# Patient Record
Sex: FEMALE | Race: BLACK OR AFRICAN AMERICAN | Employment: FULL TIME | URBAN - METROPOLITAN AREA
[De-identification: names, ages, dates, MRNs, and addresses within clinical notes are randomized per-mention and may not be internally consistent; named-entity substitution may affect disease eponyms.]

---

## 2020-02-18 PROBLEM — O09.529 AMA (ADVANCED MATERNAL AGE) MULTIGRAVIDA 35+: Status: ACTIVE | Noted: 2020-02-18

## 2020-02-18 PROBLEM — Z82.79 FAMILY HISTORY OF CONGENITAL HEART DEFECT: Status: ACTIVE | Noted: 2020-02-18

## 2020-02-18 PROBLEM — E07.9 THYROID DISEASE: Status: ACTIVE | Noted: 2020-02-18

## 2020-02-18 PROBLEM — D57.3 SICKLE CELL TRAIT (HCC): Status: ACTIVE | Noted: 2020-02-18

## 2020-02-18 PROBLEM — B00.9 HERPES SIMPLEX: Status: ACTIVE | Noted: 2020-02-18

## 2020-03-05 PROBLEM — O99.019 SICKLE CELL TRAIT IN MOTHER AFFECTING PREGNANCY (HCC): Status: ACTIVE | Noted: 2020-02-18

## 2020-03-05 PROBLEM — O35.2XX0 HEREDITARY DISEASE IN FAMILY POSSIBLY AFFECTING FETUS: Status: ACTIVE | Noted: 2020-02-18

## 2020-03-05 PROBLEM — O09.521 MULTIGRAVIDA OF ADVANCED MATERNAL AGE IN FIRST TRIMESTER: Status: ACTIVE | Noted: 2020-02-18

## 2020-03-05 PROBLEM — O99.280 THYROID DYSFUNCTION IN PREGNANCY, ANTEPARTUM: Status: ACTIVE | Noted: 2020-02-18

## 2020-03-09 PROBLEM — O35.2XX0: Status: ACTIVE | Noted: 2020-02-18

## 2020-03-09 PROBLEM — D25.9 UTERINE FIBROID IN PREGNANCY: Status: ACTIVE | Noted: 2020-03-09

## 2020-03-09 PROBLEM — O99.211 OBESITY AFFECTING PREGNANCY IN FIRST TRIMESTER: Status: ACTIVE | Noted: 2020-03-09

## 2020-03-09 PROBLEM — Z86.19 H/O HERPES SIMPLEX INFECTION: Status: ACTIVE | Noted: 2020-02-18

## 2020-03-09 PROBLEM — O34.10 UTERINE FIBROID IN PREGNANCY: Status: ACTIVE | Noted: 2020-03-09

## 2020-03-16 PROBLEM — O99.280 HYPERTHYROIDISM AFFECTING PREGNANCY: Status: ACTIVE | Noted: 2020-03-16

## 2020-03-16 PROBLEM — E05.90 HYPERTHYROIDISM AFFECTING PREGNANCY: Status: ACTIVE | Noted: 2020-03-16

## 2020-03-19 PROBLEM — R87.612 LGSIL ON PAP SMEAR OF CERVIX: Status: ACTIVE | Noted: 2020-03-19

## 2020-04-28 PROBLEM — O35.2XX0: Status: RESOLVED | Noted: 2020-02-18 | Resolved: 2020-04-28

## 2020-04-28 PROBLEM — E05.90 HYPERTHYROIDISM AFFECTING PREGNANCY: Status: RESOLVED | Noted: 2020-03-16 | Resolved: 2020-04-28

## 2020-04-28 PROBLEM — O99.280 HYPERTHYROIDISM AFFECTING PREGNANCY: Status: RESOLVED | Noted: 2020-03-16 | Resolved: 2020-04-28

## 2020-04-29 PROBLEM — O99.212 OBESITY AFFECTING PREGNANCY IN SECOND TRIMESTER: Status: ACTIVE | Noted: 2020-03-09

## 2020-04-29 PROBLEM — O09.522 MULTIGRAVIDA OF ADVANCED MATERNAL AGE IN SECOND TRIMESTER: Status: ACTIVE | Noted: 2020-02-18

## 2020-06-10 PROBLEM — O99.891 BACK PAIN AFFECTING PREGNANCY IN SECOND TRIMESTER: Status: ACTIVE | Noted: 2020-06-10

## 2020-06-10 PROBLEM — M54.9 BACK PAIN AFFECTING PREGNANCY IN SECOND TRIMESTER: Status: ACTIVE | Noted: 2020-06-10

## 2020-07-06 PROBLEM — O24.419 GESTATIONAL DIABETES MELLITUS (GDM): Status: ACTIVE | Noted: 2020-07-06

## 2020-07-20 PROBLEM — O16.9 ELEVATED BLOOD PRESSURE AFFECTING PREGNANCY, ANTEPARTUM: Status: ACTIVE | Noted: 2020-07-20

## 2020-07-22 ENCOUNTER — HOSPITAL ENCOUNTER (OUTPATIENT)
Dept: DIABETES SERVICES | Age: 45
Discharge: HOME | End: 2020-07-22
Payer: COMMERCIAL

## 2020-07-22 VITALS — BODY MASS INDEX: 34.11 KG/M2 | WEIGHT: 205 LBS

## 2020-07-22 PROBLEM — O09.523 MULTIGRAVIDA OF ADVANCED MATERNAL AGE IN THIRD TRIMESTER: Status: ACTIVE | Noted: 2020-02-18

## 2020-07-22 PROBLEM — O24.410 DIET CONTROLLED GESTATIONAL DIABETES MELLITUS (GDM) IN THIRD TRIMESTER: Status: ACTIVE | Noted: 2020-07-06

## 2020-07-22 PROBLEM — O99.213 OBESITY AFFECTING PREGNANCY IN THIRD TRIMESTER: Status: ACTIVE | Noted: 2020-03-09

## 2020-07-22 PROCEDURE — G0109 DIAB MANAGE TRN IND/GROUP: HCPCS

## 2020-07-22 NOTE — PROGRESS NOTES
This is a class  appointment with limited persons allowed in class due to DZSDX-23 public health emergency. Social distancing and mandatory precautions are in place and utilized. Gestational Diabetes Self-Management Support Plan    Services Provided: Pt received education on nutrition and meal planning during pregnancy. Emotional support for adjustment to diagnosis was provided. Care Plan/Recommendations:  Pt instructed to record blood sugar 4x/day and record all meals and snacks. Pt to bring information to appointments with [facility]etal [facility]. Notes for Follow Up:   1. Barriers to checking blood glucose and adherence to meal plan identified:  Pt reports she is not always able to get a break at work to check blood sugars. 2. Barriers to psychological adjustment to diagnosis identified: none  3. Patient needs to be seen by [facility] ASAP due to: appointment is 7/23/20.     Betzy Colon, 66 [address], , CDE  [facility]

## 2020-07-23 PROBLEM — O36.5990 POOR FETAL GROWTH, AFFECTING MANAGEMENT OF MOTHER, ANTEPARTUM CONDITION OR COMPLICATION: Status: ACTIVE | Noted: 2020-07-23

## 2020-07-29 ENCOUNTER — HOSPITAL ENCOUNTER (INPATIENT)
Age: 45
LOS: 3 days | Discharge: HOME | End: 2020-08-01
Attending: OBSTETRICS & GYNECOLOGY | Admitting: OBSTETRICS & GYNECOLOGY
Payer: COMMERCIAL

## 2020-07-29 ENCOUNTER — ANESTHESIA (OUTPATIENT)
Dept: LABOR AND DELIVERY | Age: 45
End: 2020-07-29
Payer: COMMERCIAL

## 2020-07-29 ENCOUNTER — APPOINTMENT (OUTPATIENT)
Dept: ULTRASOUND IMAGING | Age: 45
End: 2020-07-29
Attending: OBSTETRICS & GYNECOLOGY
Payer: COMMERCIAL

## 2020-07-29 ENCOUNTER — ANESTHESIA EVENT (OUTPATIENT)
Dept: LABOR AND DELIVERY | Age: 45
End: 2020-07-29
Payer: COMMERCIAL

## 2020-07-29 DIAGNOSIS — O16.9 ELEVATED BLOOD PRESSURE AFFECTING PREGNANCY, ANTEPARTUM: ICD-10-CM

## 2020-07-29 DIAGNOSIS — O45.90 ANTEPARTUM PLACENTAL ABRUPTION: ICD-10-CM

## 2020-07-29 DIAGNOSIS — O36.4XX0 FETAL DEMISE IN SINGLETON PREGNANCY GREATER THAN 22 WEEKS GESTATION, ANTEPARTUM: ICD-10-CM

## 2020-07-29 DIAGNOSIS — O24.410 DIET CONTROLLED GESTATIONAL DIABETES MELLITUS (GDM) IN THIRD TRIMESTER: ICD-10-CM

## 2020-07-29 LAB
ALBUMIN SERPL-MCNC: 1.8 G/DL (ref 3.5–5)
ALBUMIN SERPL-MCNC: 2 G/DL (ref 3.5–5)
ALBUMIN SERPL-MCNC: 2.6 G/DL (ref 3.5–5)
ALBUMIN/GLOB SERPL: 0.6 {RATIO} (ref 1.2–3.5)
ALBUMIN/GLOB SERPL: 0.7 {RATIO} (ref 1.2–3.5)
ALBUMIN/GLOB SERPL: 0.7 {RATIO} (ref 1.2–3.5)
ALP SERPL-CCNC: 56 U/L (ref 50–130)
ALP SERPL-CCNC: 65 U/L (ref 50–130)
ALP SERPL-CCNC: 94 U/L (ref 50–136)
ALT SERPL-CCNC: 15 U/L (ref 12–65)
ALT SERPL-CCNC: 20 U/L (ref 12–65)
ALT SERPL-CCNC: 40 U/L (ref 12–65)
AMPHET UR QL SCN: NEGATIVE
ANION GAP SERPL CALC-SCNC: 10 MMOL/L (ref 7–16)
ANION GAP SERPL CALC-SCNC: 5 MMOL/L (ref 7–16)
ANION GAP SERPL CALC-SCNC: 8 MMOL/L (ref 7–16)
ANION GAP SERPL CALC-SCNC: 9 MMOL/L (ref 7–16)
ANION GAP SERPL CALC-SCNC: 9 MMOL/L (ref 7–16)
APTT PPP: 27.6 SEC (ref 24.3–35.4)
APTT PPP: 31.6 SEC (ref 24.3–35.4)
APTT PPP: 33.2 SEC (ref 24.3–35.4)
APTT PPP: 40.5 SEC (ref 24.3–35.4)
ARTERIAL PATENCY WRIST A: ABNORMAL
ARTERIAL PATENCY WRIST A: ABNORMAL
AST SERPL-CCNC: 19 U/L (ref 15–37)
AST SERPL-CCNC: 27 U/L (ref 15–37)
AST SERPL-CCNC: 42 U/L (ref 15–37)
BARBITURATES UR QL SCN: NEGATIVE
BASE DEFICIT BLD-SCNC: 23 MMOL/L
BASOPHILS # BLD: 0 K/UL (ref 0–0.2)
BASOPHILS # BLD: 0.1 K/UL (ref 0–0.2)
BASOPHILS NFR BLD: 0 % (ref 0–2)
BDY SITE: ABNORMAL
BDY SITE: ABNORMAL
BENZODIAZ UR QL: NEGATIVE
BILIRUB SERPL-MCNC: 0.1 MG/DL (ref 0.2–1.1)
BILIRUB SERPL-MCNC: 0.3 MG/DL (ref 0.2–1.1)
BILIRUB SERPL-MCNC: 0.4 MG/DL (ref 0.2–1.1)
BUN SERPL-MCNC: 18 MG/DL (ref 6–23)
BUN SERPL-MCNC: 21 MG/DL (ref 6–23)
BUN SERPL-MCNC: 22 MG/DL (ref 6–23)
BUN SERPL-MCNC: 22 MG/DL (ref 6–23)
BUN SERPL-MCNC: 24 MG/DL (ref 6–23)
CA-I BLD-MCNC: 1.44 MMOL/L (ref 1.12–1.32)
CA-I BLD-MCNC: 1.6 MMOL/L (ref 1.12–1.32)
CALCIUM SERPL-MCNC: 7.2 MG/DL (ref 8.3–10.4)
CALCIUM SERPL-MCNC: 7.4 MG/DL (ref 8.3–10.4)
CALCIUM SERPL-MCNC: 7.6 MG/DL (ref 8.3–10.4)
CALCIUM SERPL-MCNC: 7.6 MG/DL (ref 8.3–10.4)
CALCIUM SERPL-MCNC: 8.8 MG/DL (ref 8.3–10.4)
CANNABINOIDS UR QL SCN: NEGATIVE
CHLORIDE SERPL-SCNC: 108 MMOL/L (ref 98–107)
CHLORIDE SERPL-SCNC: 109 MMOL/L (ref 98–107)
CHLORIDE SERPL-SCNC: 110 MMOL/L (ref 98–107)
CHLORIDE SERPL-SCNC: 112 MMOL/L (ref 98–107)
CHLORIDE SERPL-SCNC: 113 MMOL/L (ref 98–107)
CO2 SERPL-SCNC: 17 MMOL/L (ref 21–32)
CO2 SERPL-SCNC: 17 MMOL/L (ref 21–32)
CO2 SERPL-SCNC: 18 MMOL/L (ref 21–32)
CO2 SERPL-SCNC: 18 MMOL/L (ref 21–32)
CO2 SERPL-SCNC: 19 MMOL/L (ref 21–32)
COCAINE UR QL SCN: NEGATIVE
COLLECT TIME,HTIME: 751
COLLECT TIME,HTIME: 751
CREAT SERPL-MCNC: 0.88 MG/DL (ref 0.6–1)
CREAT SERPL-MCNC: 0.91 MG/DL (ref 0.6–1)
CREAT SERPL-MCNC: 0.91 MG/DL (ref 0.6–1)
CREAT SERPL-MCNC: 0.97 MG/DL (ref 0.6–1)
CREAT SERPL-MCNC: 1 MG/DL (ref 0.6–1)
DIFFERENTIAL METHOD BLD: ABNORMAL
EOSINOPHIL # BLD: 0 K/UL (ref 0–0.8)
EOSINOPHIL # BLD: 0.1 K/UL (ref 0–0.8)
EOSINOPHIL # BLD: 0.5 K/UL (ref 0–0.8)
EOSINOPHIL # BLD: 0.6 K/UL (ref 0–0.8)
EOSINOPHIL NFR BLD: 0 % (ref 0.5–7.8)
EOSINOPHIL NFR BLD: 3 % (ref 0.5–7.8)
EOSINOPHIL NFR BLD: 4 % (ref 0.5–7.8)
ERYTHROCYTE [DISTWIDTH] IN BLOOD BY AUTOMATED COUNT: 17.2 % (ref 11.9–14.6)
ERYTHROCYTE [DISTWIDTH] IN BLOOD BY AUTOMATED COUNT: 17.3 % (ref 11.9–14.6)
ERYTHROCYTE [DISTWIDTH] IN BLOOD BY AUTOMATED COUNT: 17.3 % (ref 11.9–14.6)
ERYTHROCYTE [DISTWIDTH] IN BLOOD BY AUTOMATED COUNT: 17.4 % (ref 11.9–14.6)
ERYTHROCYTE [DISTWIDTH] IN BLOOD BY AUTOMATED COUNT: 17.6 % (ref 11.9–14.6)
EST. AVERAGE GLUCOSE BLD GHB EST-MCNC: NORMAL MG/DL
FIBRINOGEN PPP-MCNC: 142 MG/DL (ref 190–501)
FIBRINOGEN PPP-MCNC: 158 MG/DL (ref 190–501)
FIBRINOGEN PPP-MCNC: 265 MG/DL (ref 190–501)
FIBRINOGEN PPP-MCNC: 99 MG/DL (ref 190–501)
GAS FLOW.O2 O2 DELIVERY SYS: ABNORMAL L/MIN
GAS FLOW.O2 O2 DELIVERY SYS: ABNORMAL L/MIN
GLOBULIN SER CALC-MCNC: 2.6 G/DL (ref 2.3–3.5)
GLOBULIN SER CALC-MCNC: 2.9 G/DL (ref 2.3–3.5)
GLOBULIN SER CALC-MCNC: 4.1 G/DL (ref 2.3–3.5)
GLUCOSE BLD STRIP.AUTO-MCNC: 57 MG/DL (ref 65–100)
GLUCOSE BLD STRIP.AUTO-MCNC: 73 MG/DL (ref 65–100)
GLUCOSE SERPL-MCNC: 111 MG/DL (ref 65–100)
GLUCOSE SERPL-MCNC: 119 MG/DL (ref 65–100)
GLUCOSE SERPL-MCNC: 122 MG/DL (ref 65–100)
GLUCOSE SERPL-MCNC: 133 MG/DL (ref 65–100)
GLUCOSE SERPL-MCNC: 148 MG/DL (ref 65–100)
HBA1C MFR BLD: 4.7 %
HCO3 BLD-SCNC: 16.5 MMOL/L (ref 22–26)
HCO3 BLD-SCNC: ABNORMAL MMOL/L (ref 22–26)
HCT VFR BLD AUTO: 19.6 % (ref 35.8–46.3)
HCT VFR BLD AUTO: 21.6 % (ref 35.8–46.3)
HCT VFR BLD AUTO: 21.9 % (ref 35.8–46.3)
HCT VFR BLD AUTO: 23.8 % (ref 35.8–46.3)
HCT VFR BLD AUTO: 25.3 % (ref 35.8–46.3)
HCT VFR BLD AUTO: 27.3 % (ref 35.8–46.3)
HCT VFR BLD AUTO: 35.9 % (ref 35.8–46.3)
HGB BLD-MCNC: 10.8 G/DL (ref 11.7–15.4)
HGB BLD-MCNC: 6.2 G/DL (ref 11.7–15.4)
HGB BLD-MCNC: 6.8 G/DL (ref 11.7–15.4)
HGB BLD-MCNC: 7 G/DL (ref 11.7–15.4)
HGB BLD-MCNC: 7.6 G/DL (ref 11.7–15.4)
HGB BLD-MCNC: 8.2 G/DL (ref 11.7–15.4)
HGB BLD-MCNC: 8.5 G/DL (ref 11.7–15.4)
IMM GRANULOCYTES # BLD AUTO: 0.1 K/UL (ref 0–0.5)
IMM GRANULOCYTES # BLD AUTO: 0.2 K/UL (ref 0–0.5)
IMM GRANULOCYTES NFR BLD AUTO: 1 % (ref 0–5)
INR PPP: 1
INR PPP: 1.1
INR PPP: 1.2
INR PPP: 1.3
LACTATE SERPL-SCNC: 2.7 MMOL/L (ref 0.4–2)
LDH SERPL L TO P-CCNC: 223 U/L (ref 100–190)
LYMPHOCYTES # BLD: 1.6 K/UL (ref 0.5–4.6)
LYMPHOCYTES # BLD: 1.7 K/UL (ref 0.5–4.6)
LYMPHOCYTES # BLD: 1.9 K/UL (ref 0.5–4.6)
LYMPHOCYTES # BLD: 2.1 K/UL (ref 0.5–4.6)
LYMPHOCYTES # BLD: 5.8 K/UL (ref 0.5–4.6)
LYMPHOCYTES # BLD: 6.8 K/UL (ref 0.5–4.6)
LYMPHOCYTES NFR BLD: 11 % (ref 13–44)
LYMPHOCYTES NFR BLD: 38 % (ref 13–44)
LYMPHOCYTES NFR BLD: 45 % (ref 13–44)
LYMPHOCYTES NFR BLD: 7 % (ref 13–44)
LYMPHOCYTES NFR BLD: 8 % (ref 13–44)
LYMPHOCYTES NFR BLD: 9 % (ref 13–44)
MAGNESIUM SERPL-MCNC: 1.5 MG/DL (ref 1.8–2.4)
MCH RBC QN AUTO: 27.6 PG (ref 26.1–32.9)
MCH RBC QN AUTO: 27.7 PG (ref 26.1–32.9)
MCH RBC QN AUTO: 28.1 PG (ref 26.1–32.9)
MCH RBC QN AUTO: 28.4 PG (ref 26.1–32.9)
MCHC RBC AUTO-ENTMCNC: 30.1 G/DL (ref 31.4–35)
MCHC RBC AUTO-ENTMCNC: 31.1 G/DL (ref 31.4–35)
MCHC RBC AUTO-ENTMCNC: 31.5 G/DL (ref 31.4–35)
MCHC RBC AUTO-ENTMCNC: 31.6 G/DL (ref 31.4–35)
MCHC RBC AUTO-ENTMCNC: 31.9 G/DL (ref 31.4–35)
MCHC RBC AUTO-ENTMCNC: 32 G/DL (ref 31.4–35)
MCHC RBC AUTO-ENTMCNC: 32.4 G/DL (ref 31.4–35)
MCV RBC AUTO: 86.9 FL (ref 79.6–97.8)
MCV RBC AUTO: 87.5 FL (ref 79.6–97.8)
MCV RBC AUTO: 87.5 FL (ref 79.6–97.8)
MCV RBC AUTO: 87.8 FL (ref 79.6–97.8)
MCV RBC AUTO: 88 FL (ref 79.6–97.8)
MCV RBC AUTO: 88.9 FL (ref 79.6–97.8)
MCV RBC AUTO: 92.1 FL (ref 79.6–97.8)
METHADONE UR QL: NEGATIVE
MONOCYTES # BLD: 0.9 K/UL (ref 0.1–1.3)
MONOCYTES # BLD: 0.9 K/UL (ref 0.1–1.3)
MONOCYTES # BLD: 1.1 K/UL (ref 0.1–1.3)
MONOCYTES # BLD: 1.1 K/UL (ref 0.1–1.3)
MONOCYTES # BLD: 1.3 K/UL (ref 0.1–1.3)
MONOCYTES # BLD: 1.4 K/UL (ref 0.1–1.3)
MONOCYTES NFR BLD: 5 % (ref 4–12)
MONOCYTES NFR BLD: 5 % (ref 4–12)
MONOCYTES NFR BLD: 6 % (ref 4–12)
MONOCYTES NFR BLD: 7 % (ref 4–12)
NEUTS SEG # BLD: 15.1 K/UL (ref 1.7–8.2)
NEUTS SEG # BLD: 18.4 K/UL (ref 1.7–8.2)
NEUTS SEG # BLD: 19.4 K/UL (ref 1.7–8.2)
NEUTS SEG # BLD: 20.3 K/UL (ref 1.7–8.2)
NEUTS SEG # BLD: 6.8 K/UL (ref 1.7–8.2)
NEUTS SEG # BLD: 7.7 K/UL (ref 1.7–8.2)
NEUTS SEG NFR BLD: 44 % (ref 43–78)
NEUTS SEG NFR BLD: 51 % (ref 43–78)
NEUTS SEG NFR BLD: 83 % (ref 43–78)
NEUTS SEG NFR BLD: 84 % (ref 43–78)
NEUTS SEG NFR BLD: 86 % (ref 43–78)
NEUTS SEG NFR BLD: 86 % (ref 43–78)
NRBC # BLD: 0 K/UL (ref 0–0.2)
NRBC # BLD: 0.02 K/UL (ref 0–0.2)
NRBC # BLD: 0.03 K/UL (ref 0–0.2)
OPIATES UR QL: POSITIVE
PCO2 BLD: 129.9 MMHG (ref 35–45)
PCO2 BLD: >130 MMHG (ref 35–45)
PCP UR QL: NEGATIVE
PH BLD: 6.61 [PH] (ref 7.35–7.45)
PH BLD: 6.71 [PH] (ref 7.35–7.45)
PHOSPHATE SERPL-MCNC: 3 MG/DL (ref 2.5–4.5)
PLATELET # BLD AUTO: 132 K/UL (ref 150–450)
PLATELET # BLD AUTO: 133 K/UL (ref 150–450)
PLATELET # BLD AUTO: 136 K/UL (ref 150–450)
PLATELET # BLD AUTO: 139 K/UL (ref 150–450)
PLATELET # BLD AUTO: 174 K/UL (ref 150–450)
PLATELET # BLD AUTO: 180 K/UL (ref 150–450)
PLATELET # BLD AUTO: 229 K/UL (ref 150–450)
PLATELET COMMENTS,PCOM: ADEQUATE
PMV BLD AUTO: 9 FL (ref 9.4–12.3)
PMV BLD AUTO: 9.3 FL (ref 9.4–12.3)
PMV BLD AUTO: 9.4 FL (ref 9.4–12.3)
PMV BLD AUTO: 9.6 FL (ref 9.4–12.3)
PMV BLD AUTO: 9.7 FL (ref 9.4–12.3)
PMV BLD AUTO: 9.8 FL (ref 9.4–12.3)
PMV BLD AUTO: 9.9 FL (ref 9.4–12.3)
PO2 BLD: 20 MMHG (ref 75–100)
PO2 BLD: <5 MMHG (ref 75–100)
POTASSIUM BLD-SCNC: 6.4 MMOL/L (ref 3.5–5.1)
POTASSIUM BLD-SCNC: 6.4 MMOL/L (ref 3.5–5.1)
POTASSIUM SERPL-SCNC: 4 MMOL/L (ref 3.5–5.1)
POTASSIUM SERPL-SCNC: 4 MMOL/L (ref 3.5–5.1)
POTASSIUM SERPL-SCNC: 4.1 MMOL/L (ref 3.5–5.1)
POTASSIUM SERPL-SCNC: 4.6 MMOL/L (ref 3.5–5.1)
POTASSIUM SERPL-SCNC: 5.2 MMOL/L (ref 3.5–5.1)
PROT SERPL-MCNC: 4.4 G/DL (ref 6.3–8.2)
PROT SERPL-MCNC: 4.9 G/DL (ref 6.3–8.2)
PROT SERPL-MCNC: 6.7 G/DL (ref 6.3–8.2)
PROTHROMBIN TIME: 13.4 SEC (ref 12–14.7)
PROTHROMBIN TIME: 14.6 SEC (ref 12–14.7)
PROTHROMBIN TIME: 15.6 SEC (ref 12–14.7)
PROTHROMBIN TIME: 16.6 SEC (ref 12–14.7)
RBC # BLD AUTO: 2.24 M/UL (ref 4.05–5.2)
RBC # BLD AUTO: 2.46 M/UL (ref 4.05–5.2)
RBC # BLD AUTO: 2.49 M/UL (ref 4.05–5.2)
RBC # BLD AUTO: 2.74 M/UL (ref 4.05–5.2)
RBC # BLD AUTO: 2.89 M/UL (ref 4.05–5.2)
RBC # BLD AUTO: 3.07 M/UL (ref 4.05–5.2)
RBC # BLD AUTO: 3.9 M/UL (ref 4.05–5.2)
RBC MORPH BLD: ABNORMAL
RBC MORPH BLD: ABNORMAL
SAO2 % BLD: 9 % (ref 95–98)
SAO2 % BLD: ABNORMAL % (ref 95–98)
SERVICE CMNT-IMP: ABNORMAL
SERVICE CMNT-IMP: ABNORMAL
SODIUM BLD-SCNC: 132 MMOL/L (ref 136–145)
SODIUM BLD-SCNC: 134 MMOL/L (ref 136–145)
SODIUM SERPL-SCNC: 135 MMOL/L (ref 136–145)
SODIUM SERPL-SCNC: 135 MMOL/L (ref 136–145)
SODIUM SERPL-SCNC: 136 MMOL/L (ref 136–145)
SODIUM SERPL-SCNC: 137 MMOL/L (ref 136–145)
SODIUM SERPL-SCNC: 139 MMOL/L (ref 136–145)
SPECIMEN TYPE: ABNORMAL
SPECIMEN TYPE: ABNORMAL
T4 FREE SERPL-MCNC: 0.9 NG/DL (ref 0.78–1.46)
TSH SERPL DL<=0.005 MIU/L-ACNC: 1.82 UIU/ML
URATE SERPL-MCNC: 4.5 MG/DL (ref 2.6–6)
URATE SERPL-MCNC: 4.8 MG/DL (ref 2.6–6)
URATE SERPL-MCNC: 4.8 MG/DL (ref 2.6–6)
WBC # BLD AUTO: 15.3 K/UL (ref 4.3–11.1)
WBC # BLD AUTO: 15.3 K/UL (ref 4.3–11.1)
WBC # BLD AUTO: 16 K/UL (ref 4.3–11.1)
WBC # BLD AUTO: 18.2 K/UL (ref 4.3–11.1)
WBC # BLD AUTO: 21.4 K/UL (ref 4.3–11.1)
WBC # BLD AUTO: 22.6 K/UL (ref 4.3–11.1)
WBC # BLD AUTO: 24 K/UL (ref 4.3–11.1)
WBC MORPH BLD: ABNORMAL

## 2020-07-29 PROCEDURE — 99291 CRITICAL CARE FIRST HOUR: CPT | Performed by: OBSTETRICS & GYNECOLOGY

## 2020-07-29 PROCEDURE — 77030040922 HC BLNKT HYPOTHRM STRY -A: Performed by: ANESTHESIOLOGY

## 2020-07-29 PROCEDURE — 74011250636 HC RX REV CODE- 250/636: Performed by: OBSTETRICS & GYNECOLOGY

## 2020-07-29 PROCEDURE — 85730 THROMBOPLASTIN TIME PARTIAL: CPT

## 2020-07-29 PROCEDURE — 77030031139 HC SUT VCRL2 J&J -A: Performed by: OBSTETRICS & GYNECOLOGY

## 2020-07-29 PROCEDURE — 80307 DRUG TEST PRSMV CHEM ANLYZR: CPT

## 2020-07-29 PROCEDURE — 74011250636 HC RX REV CODE- 250/636

## 2020-07-29 PROCEDURE — 77030037088 HC TUBE ENDOTRACH ORAL NSL COVD-A: Performed by: ANESTHESIOLOGY

## 2020-07-29 PROCEDURE — 83615 LACTATE (LD) (LDH) ENZYME: CPT

## 2020-07-29 PROCEDURE — 77030002974 HC SUT PLN J&J -A: Performed by: OBSTETRICS & GYNECOLOGY

## 2020-07-29 PROCEDURE — P9016 RBC LEUKOCYTES REDUCED: HCPCS

## 2020-07-29 PROCEDURE — 74011000250 HC RX REV CODE- 250: Performed by: NURSE ANESTHETIST, CERTIFIED REGISTERED

## 2020-07-29 PROCEDURE — 83735 ASSAY OF MAGNESIUM: CPT

## 2020-07-29 PROCEDURE — 83605 ASSAY OF LACTIC ACID: CPT

## 2020-07-29 PROCEDURE — 85384 FIBRINOGEN ACTIVITY: CPT

## 2020-07-29 PROCEDURE — 80053 COMPREHEN METABOLIC PANEL: CPT

## 2020-07-29 PROCEDURE — 74011250637 HC RX REV CODE- 250/637: Performed by: OBSTETRICS & GYNECOLOGY

## 2020-07-29 PROCEDURE — 88307 TISSUE EXAM BY PATHOLOGIST: CPT

## 2020-07-29 PROCEDURE — 65610000001 HC ROOM ICU GENERAL

## 2020-07-29 PROCEDURE — 74011250636 HC RX REV CODE- 250/636: Performed by: NURSE ANESTHETIST, CERTIFIED REGISTERED

## 2020-07-29 PROCEDURE — 82803 BLOOD GASES ANY COMBINATION: CPT

## 2020-07-29 PROCEDURE — 86900 BLOOD TYPING SEROLOGIC ABO: CPT

## 2020-07-29 PROCEDURE — 77030005401 HC CATH RAD ARRO -A: Performed by: ANESTHESIOLOGY

## 2020-07-29 PROCEDURE — 76010000391 HC C SECN FIRST 1 HR: Performed by: OBSTETRICS & GYNECOLOGY

## 2020-07-29 PROCEDURE — 76060000033 HC ANESTHESIA 1 TO 1.5 HR: Performed by: OBSTETRICS & GYNECOLOGY

## 2020-07-29 PROCEDURE — 84481 FREE ASSAY (FT-3): CPT

## 2020-07-29 PROCEDURE — 84100 ASSAY OF PHOSPHORUS: CPT

## 2020-07-29 PROCEDURE — 83036 HEMOGLOBIN GLYCOSYLATED A1C: CPT

## 2020-07-29 PROCEDURE — 85025 COMPLETE CBC W/AUTO DIFF WBC: CPT

## 2020-07-29 PROCEDURE — 84439 ASSAY OF FREE THYROXINE: CPT

## 2020-07-29 PROCEDURE — 86920 COMPATIBILITY TEST SPIN: CPT

## 2020-07-29 PROCEDURE — 82947 ASSAY GLUCOSE BLOOD QUANT: CPT

## 2020-07-29 PROCEDURE — 77030002888 HC SUT CHRMC J&J -A: Performed by: OBSTETRICS & GYNECOLOGY

## 2020-07-29 PROCEDURE — 77030013794 HC KT TRNSDUC BLD EDWD -B: Performed by: ANESTHESIOLOGY

## 2020-07-29 PROCEDURE — 74011000258 HC RX REV CODE- 258: Performed by: NURSE ANESTHETIST, CERTIFIED REGISTERED

## 2020-07-29 PROCEDURE — 74011000258 HC RX REV CODE- 258: Performed by: OBSTETRICS & GYNECOLOGY

## 2020-07-29 PROCEDURE — 76705 ECHO EXAM OF ABDOMEN: CPT

## 2020-07-29 PROCEDURE — 99254 IP/OBS CNSLTJ NEW/EST MOD 60: CPT | Performed by: OBSTETRICS & GYNECOLOGY

## 2020-07-29 PROCEDURE — 77030039425 HC BLD LARYNG TRULITE DISP TELE -A: Performed by: ANESTHESIOLOGY

## 2020-07-29 PROCEDURE — 36430 TRANSFUSION BLD/BLD COMPNT: CPT

## 2020-07-29 PROCEDURE — 77030013292 HC BOWL MX PRSM J&J -A: Performed by: ANESTHESIOLOGY

## 2020-07-29 PROCEDURE — 87635 SARS-COV-2 COVID-19 AMP PRB: CPT

## 2020-07-29 PROCEDURE — 84550 ASSAY OF BLOOD/URIC ACID: CPT

## 2020-07-29 PROCEDURE — 85610 PROTHROMBIN TIME: CPT

## 2020-07-29 PROCEDURE — 77030018846 HC SOL IRR STRL H20 ICUM -A: Performed by: OBSTETRICS & GYNECOLOGY

## 2020-07-29 PROCEDURE — 74011250636 HC RX REV CODE- 250/636: Performed by: ANESTHESIOLOGY

## 2020-07-29 PROCEDURE — 84443 ASSAY THYROID STIM HORMONE: CPT

## 2020-07-29 PROCEDURE — 77030008459 HC STPLR SKN COOP -B: Performed by: OBSTETRICS & GYNECOLOGY

## 2020-07-29 PROCEDURE — 85027 COMPLETE CBC AUTOMATED: CPT

## 2020-07-29 PROCEDURE — 77030032490 HC SLV COMPR SCD KNE COVD -B: Performed by: OBSTETRICS & GYNECOLOGY

## 2020-07-29 PROCEDURE — 77030018836 HC SOL IRR NACL ICUM -A: Performed by: OBSTETRICS & GYNECOLOGY

## 2020-07-29 PROCEDURE — 99284 EMERGENCY DEPT VISIT MOD MDM: CPT | Performed by: OBSTETRICS & GYNECOLOGY

## 2020-07-29 PROCEDURE — 76815 OB US LIMITED FETUS(S): CPT

## 2020-07-29 RX ORDER — SODIUM CHLORIDE, SODIUM LACTATE, POTASSIUM CHLORIDE, CALCIUM CHLORIDE 600; 310; 30; 20 MG/100ML; MG/100ML; MG/100ML; MG/100ML
150 INJECTION, SOLUTION INTRAVENOUS CONTINUOUS
Status: DISPENSED | OUTPATIENT
Start: 2020-07-29 | End: 2020-07-29

## 2020-07-29 RX ORDER — TRANEXAMIC ACID 100 MG/ML
1 INJECTION, SOLUTION INTRAVENOUS ONCE
Status: DISPENSED | OUTPATIENT
Start: 2020-07-29 | End: 2020-07-30

## 2020-07-29 RX ORDER — ACETAMINOPHEN 325 MG/1
650 TABLET ORAL
Status: DISCONTINUED | OUTPATIENT
Start: 2020-07-29 | End: 2020-08-02 | Stop reason: HOSPADM

## 2020-07-29 RX ORDER — SUCCINYLCHOLINE CHLORIDE 20 MG/ML
INJECTION INTRAMUSCULAR; INTRAVENOUS AS NEEDED
Status: DISCONTINUED | OUTPATIENT
Start: 2020-07-29 | End: 2020-07-29 | Stop reason: HOSPADM

## 2020-07-29 RX ORDER — HYDROMORPHONE HYDROCHLORIDE 1 MG/ML
0.5 INJECTION, SOLUTION INTRAMUSCULAR; INTRAVENOUS; SUBCUTANEOUS ONCE
Status: COMPLETED | OUTPATIENT
Start: 2020-07-29 | End: 2020-07-29

## 2020-07-29 RX ORDER — NALOXONE HYDROCHLORIDE 0.4 MG/ML
0.4 INJECTION, SOLUTION INTRAMUSCULAR; INTRAVENOUS; SUBCUTANEOUS AS NEEDED
Status: DISCONTINUED | OUTPATIENT
Start: 2020-07-29 | End: 2020-07-30

## 2020-07-29 RX ORDER — SODIUM CHLORIDE 9 MG/ML
INJECTION, SOLUTION INTRAVENOUS
Status: DISCONTINUED | OUTPATIENT
Start: 2020-07-29 | End: 2020-07-29 | Stop reason: HOSPADM

## 2020-07-29 RX ORDER — LIDOCAINE HYDROCHLORIDE 20 MG/ML
JELLY TOPICAL
Status: DISCONTINUED | OUTPATIENT
Start: 2020-07-29 | End: 2020-07-29 | Stop reason: HOSPADM

## 2020-07-29 RX ORDER — ONDANSETRON 2 MG/ML
4 INJECTION INTRAMUSCULAR; INTRAVENOUS
Status: DISCONTINUED | OUTPATIENT
Start: 2020-07-29 | End: 2020-08-02 | Stop reason: HOSPADM

## 2020-07-29 RX ORDER — ACETAMINOPHEN 650 MG/1
650 SUPPOSITORY RECTAL
Status: DISCONTINUED | OUTPATIENT
Start: 2020-07-29 | End: 2020-08-02 | Stop reason: HOSPADM

## 2020-07-29 RX ORDER — OXYTOCIN/RINGER'S LACTATE 30/500 ML
PLASTIC BAG, INJECTION (ML) INTRAVENOUS
Status: DISCONTINUED | OUTPATIENT
Start: 2020-07-29 | End: 2020-07-29 | Stop reason: HOSPADM

## 2020-07-29 RX ORDER — SODIUM CHLORIDE 9 MG/ML
250 INJECTION, SOLUTION INTRAVENOUS AS NEEDED
Status: DISCONTINUED | OUTPATIENT
Start: 2020-07-29 | End: 2020-07-30

## 2020-07-29 RX ORDER — OXYCODONE AND ACETAMINOPHEN 7.5; 325 MG/1; MG/1
2 TABLET ORAL
Status: DISCONTINUED | OUTPATIENT
Start: 2020-07-29 | End: 2020-08-02 | Stop reason: HOSPADM

## 2020-07-29 RX ORDER — PROMETHAZINE HYDROCHLORIDE 25 MG/1
12.5 TABLET ORAL
Status: DISCONTINUED | OUTPATIENT
Start: 2020-07-29 | End: 2020-08-02 | Stop reason: HOSPADM

## 2020-07-29 RX ORDER — SODIUM CHLORIDE 9 MG/ML
500 INJECTION, SOLUTION INTRAVENOUS CONTINUOUS
Status: DISCONTINUED | OUTPATIENT
Start: 2020-07-29 | End: 2020-07-30

## 2020-07-29 RX ORDER — OXYCODONE HYDROCHLORIDE 5 MG/1
5 TABLET ORAL
Status: DISCONTINUED | OUTPATIENT
Start: 2020-07-29 | End: 2020-07-29

## 2020-07-29 RX ORDER — SODIUM CHLORIDE 0.9 % (FLUSH) 0.9 %
5-40 SYRINGE (ML) INJECTION AS NEEDED
Status: DISCONTINUED | OUTPATIENT
Start: 2020-07-29 | End: 2020-08-02 | Stop reason: HOSPADM

## 2020-07-29 RX ORDER — SODIUM CHLORIDE 0.9 % (FLUSH) 0.9 %
5-40 SYRINGE (ML) INJECTION EVERY 8 HOURS
Status: DISCONTINUED | OUTPATIENT
Start: 2020-07-29 | End: 2020-08-02 | Stop reason: HOSPADM

## 2020-07-29 RX ORDER — DEXTROSE, SODIUM CHLORIDE, SODIUM LACTATE, POTASSIUM CHLORIDE, AND CALCIUM CHLORIDE 5; .6; .31; .03; .02 G/100ML; G/100ML; G/100ML; G/100ML; G/100ML
125 INJECTION, SOLUTION INTRAVENOUS CONTINUOUS
Status: DISCONTINUED | OUTPATIENT
Start: 2020-07-29 | End: 2020-07-29 | Stop reason: HOSPADM

## 2020-07-29 RX ORDER — ETOMIDATE 2 MG/ML
INJECTION INTRAVENOUS AS NEEDED
Status: DISCONTINUED | OUTPATIENT
Start: 2020-07-29 | End: 2020-07-29 | Stop reason: HOSPADM

## 2020-07-29 RX ORDER — FAMOTIDINE 20 MG/1
20 TABLET, FILM COATED ORAL EVERY 24 HOURS
Status: DISCONTINUED | OUTPATIENT
Start: 2020-07-29 | End: 2020-08-02 | Stop reason: HOSPADM

## 2020-07-29 RX ORDER — BUTORPHANOL TARTRATE 2 MG/ML
1 INJECTION INTRAMUSCULAR; INTRAVENOUS
Status: DISCONTINUED | OUTPATIENT
Start: 2020-07-29 | End: 2020-07-29 | Stop reason: HOSPADM

## 2020-07-29 RX ORDER — CEFAZOLIN SODIUM/WATER 2 G/20 ML
SYRINGE (ML) INTRAVENOUS AS NEEDED
Status: DISCONTINUED | OUTPATIENT
Start: 2020-07-29 | End: 2020-07-29 | Stop reason: HOSPADM

## 2020-07-29 RX ORDER — KETOROLAC TROMETHAMINE 30 MG/ML
30 INJECTION, SOLUTION INTRAMUSCULAR; INTRAVENOUS
Status: COMPLETED | OUTPATIENT
Start: 2020-07-29 | End: 2020-07-29

## 2020-07-29 RX ORDER — HYDROMORPHONE HYDROCHLORIDE 1 MG/ML
INJECTION, SOLUTION INTRAMUSCULAR; INTRAVENOUS; SUBCUTANEOUS
Status: COMPLETED
Start: 2020-07-29 | End: 2020-07-29

## 2020-07-29 RX ORDER — LIDOCAINE HYDROCHLORIDE 10 MG/ML
1 INJECTION INFILTRATION; PERINEURAL
Status: DISCONTINUED | OUTPATIENT
Start: 2020-07-29 | End: 2020-07-29 | Stop reason: HOSPADM

## 2020-07-29 RX ORDER — METHYLERGONOVINE MALEATE 0.2 MG/ML
INJECTION INTRAVENOUS AS NEEDED
Status: DISCONTINUED | OUTPATIENT
Start: 2020-07-29 | End: 2020-07-29 | Stop reason: HOSPADM

## 2020-07-29 RX ORDER — OXYTOCIN/RINGER'S LACTATE 30/500 ML
250 PLASTIC BAG, INJECTION (ML) INTRAVENOUS ONCE
Status: DISCONTINUED | OUTPATIENT
Start: 2020-07-29 | End: 2020-07-29 | Stop reason: HOSPADM

## 2020-07-29 RX ORDER — MINERAL OIL
120 OIL (ML) ORAL
Status: DISCONTINUED | OUTPATIENT
Start: 2020-07-29 | End: 2020-07-29 | Stop reason: HOSPADM

## 2020-07-29 RX ORDER — SODIUM CHLORIDE 0.9 % (FLUSH) 0.9 %
5-40 SYRINGE (ML) INJECTION AS NEEDED
Status: DISCONTINUED | OUTPATIENT
Start: 2020-07-29 | End: 2020-07-29 | Stop reason: HOSPADM

## 2020-07-29 RX ORDER — IBUPROFEN 800 MG/1
800 TABLET ORAL
Status: DISCONTINUED | OUTPATIENT
Start: 2020-07-29 | End: 2020-07-29

## 2020-07-29 RX ORDER — HYDROMORPHONE HYDROCHLORIDE 1 MG/ML
1 INJECTION, SOLUTION INTRAMUSCULAR; INTRAVENOUS; SUBCUTANEOUS
Status: DISCONTINUED | OUTPATIENT
Start: 2020-07-29 | End: 2020-07-29

## 2020-07-29 RX ORDER — FENTANYL CITRATE 50 UG/ML
INJECTION, SOLUTION INTRAMUSCULAR; INTRAVENOUS AS NEEDED
Status: DISCONTINUED | OUTPATIENT
Start: 2020-07-29 | End: 2020-07-29 | Stop reason: HOSPADM

## 2020-07-29 RX ORDER — SODIUM CHLORIDE 9 MG/ML
1000 INJECTION, SOLUTION INTRAVENOUS CONTINUOUS
Status: DISCONTINUED | OUTPATIENT
Start: 2020-07-29 | End: 2020-07-30

## 2020-07-29 RX ORDER — OXYCODONE AND ACETAMINOPHEN 7.5; 325 MG/1; MG/1
1 TABLET ORAL
Status: DISCONTINUED | OUTPATIENT
Start: 2020-07-29 | End: 2020-08-02 | Stop reason: HOSPADM

## 2020-07-29 RX ORDER — METHIMAZOLE 5 MG/1
5 TABLET ORAL EVERY 8 HOURS
Status: CANCELLED | OUTPATIENT
Start: 2020-07-29

## 2020-07-29 RX ORDER — DIPHENHYDRAMINE HCL 25 MG
25 CAPSULE ORAL
Status: DISCONTINUED | OUTPATIENT
Start: 2020-07-29 | End: 2020-08-02 | Stop reason: HOSPADM

## 2020-07-29 RX ORDER — POLYETHYLENE GLYCOL 3350 17 G/17G
17 POWDER, FOR SOLUTION ORAL DAILY PRN
Status: DISCONTINUED | OUTPATIENT
Start: 2020-07-29 | End: 2020-08-02 | Stop reason: HOSPADM

## 2020-07-29 RX ORDER — SODIUM CHLORIDE, SODIUM LACTATE, POTASSIUM CHLORIDE, CALCIUM CHLORIDE 600; 310; 30; 20 MG/100ML; MG/100ML; MG/100ML; MG/100ML
INJECTION, SOLUTION INTRAVENOUS
Status: DISCONTINUED | OUTPATIENT
Start: 2020-07-29 | End: 2020-07-29 | Stop reason: HOSPADM

## 2020-07-29 RX ORDER — ZOLPIDEM TARTRATE 5 MG/1
5 TABLET ORAL
Status: DISCONTINUED | OUTPATIENT
Start: 2020-07-29 | End: 2020-08-02 | Stop reason: HOSPADM

## 2020-07-29 RX ORDER — SODIUM CHLORIDE 0.9 % (FLUSH) 0.9 %
5-40 SYRINGE (ML) INJECTION EVERY 8 HOURS
Status: DISCONTINUED | OUTPATIENT
Start: 2020-07-29 | End: 2020-07-29 | Stop reason: HOSPADM

## 2020-07-29 RX ORDER — ROCURONIUM BROMIDE 10 MG/ML
INJECTION, SOLUTION INTRAVENOUS AS NEEDED
Status: DISCONTINUED | OUTPATIENT
Start: 2020-07-29 | End: 2020-07-29 | Stop reason: HOSPADM

## 2020-07-29 RX ORDER — DOCUSATE SODIUM 100 MG/1
100 CAPSULE, LIQUID FILLED ORAL 2 TIMES DAILY
Status: DISCONTINUED | OUTPATIENT
Start: 2020-07-29 | End: 2020-08-02 | Stop reason: HOSPADM

## 2020-07-29 RX ORDER — KETOROLAC TROMETHAMINE 30 MG/ML
30 INJECTION, SOLUTION INTRAMUSCULAR; INTRAVENOUS
Status: DISCONTINUED | OUTPATIENT
Start: 2020-07-29 | End: 2020-07-30

## 2020-07-29 RX ORDER — SIMETHICONE 80 MG
80 TABLET,CHEWABLE ORAL
Status: DISCONTINUED | OUTPATIENT
Start: 2020-07-29 | End: 2020-08-02 | Stop reason: HOSPADM

## 2020-07-29 RX ORDER — HYDROMORPHONE HYDROCHLORIDE 1 MG/ML
0.5 INJECTION, SOLUTION INTRAMUSCULAR; INTRAVENOUS; SUBCUTANEOUS
Status: DISCONTINUED | OUTPATIENT
Start: 2020-07-29 | End: 2020-07-29

## 2020-07-29 RX ADMIN — ONDANSETRON 4 MG: 2 INJECTION INTRAMUSCULAR; INTRAVENOUS at 14:05

## 2020-07-29 RX ADMIN — HYDROMORPHONE HYDROCHLORIDE 0.5 MG: 1 INJECTION, SOLUTION INTRAMUSCULAR; INTRAVENOUS; SUBCUTANEOUS at 08:21

## 2020-07-29 RX ADMIN — SUCCINYLCHOLINE CHLORIDE 160 MG: 20 INJECTION, SOLUTION INTRAMUSCULAR; INTRAVENOUS at 06:43

## 2020-07-29 RX ADMIN — ROCURONIUM BROMIDE 10 MG: 10 INJECTION, SOLUTION INTRAVENOUS at 06:57

## 2020-07-29 RX ADMIN — TRANEXAMIC ACID 1 G: 100 INJECTION, SOLUTION INTRAVENOUS at 06:55

## 2020-07-29 RX ADMIN — FENTANYL CITRATE 50 MCG: 50 INJECTION INTRAMUSCULAR; INTRAVENOUS at 06:56

## 2020-07-29 RX ADMIN — Medication 500 UNITS/HR: at 06:52

## 2020-07-29 RX ADMIN — SODIUM CHLORIDE, SODIUM LACTATE, POTASSIUM CHLORIDE, AND CALCIUM CHLORIDE 150 ML/HR: 600; 310; 30; 20 INJECTION, SOLUTION INTRAVENOUS at 09:00

## 2020-07-29 RX ADMIN — SODIUM CHLORIDE: 9 INJECTION, SOLUTION INTRAVENOUS at 06:53

## 2020-07-29 RX ADMIN — Medication 10 ML: at 22:00

## 2020-07-29 RX ADMIN — SIMETHICONE CHEW TAB 80 MG 80 MG: 80 TABLET ORAL at 23:38

## 2020-07-29 RX ADMIN — METHYLERGONOVINE MALEATE 0.2 MG: 0.2 INJECTION, SOLUTION INTRAMUSCULAR; INTRAVENOUS at 06:57

## 2020-07-29 RX ADMIN — SODIUM CHLORIDE 1000 ML: 9 INJECTION, SOLUTION INTRAVENOUS at 12:15

## 2020-07-29 RX ADMIN — Medication 10 ML: at 14:00

## 2020-07-29 RX ADMIN — SODIUM CHLORIDE, SODIUM LACTATE, POTASSIUM CHLORIDE, AND CALCIUM CHLORIDE 150 ML/HR: 600; 310; 30; 20 INJECTION, SOLUTION INTRAVENOUS at 15:11

## 2020-07-29 RX ADMIN — SODIUM CHLORIDE 500 ML: 900 INJECTION, SOLUTION INTRAVENOUS at 15:00

## 2020-07-29 RX ADMIN — KETOROLAC TROMETHAMINE 30 MG: 30 INJECTION, SOLUTION INTRAMUSCULAR at 08:41

## 2020-07-29 RX ADMIN — OXYCODONE HYDROCHLORIDE AND ACETAMINOPHEN 1 TABLET: 7.5; 325 TABLET ORAL at 18:53

## 2020-07-29 RX ADMIN — Medication 2 G: at 06:50

## 2020-07-29 RX ADMIN — ETOMIDATE 20 MG: 2 INJECTION, SOLUTION INTRAVENOUS at 06:43

## 2020-07-29 RX ADMIN — SODIUM CHLORIDE, SODIUM LACTATE, POTASSIUM CHLORIDE, AND CALCIUM CHLORIDE 150 ML/HR: 600; 310; 30; 20 INJECTION, SOLUTION INTRAVENOUS at 20:51

## 2020-07-29 RX ADMIN — FENTANYL CITRATE 50 MCG: 50 INJECTION INTRAMUSCULAR; INTRAVENOUS at 06:48

## 2020-07-29 RX ADMIN — SUGAMMADEX 200 MG: 100 INJECTION, SOLUTION INTRAVENOUS at 07:16

## 2020-07-29 RX ADMIN — DIPHENHYDRAMINE HYDROCHLORIDE 25 MG: 25 CAPSULE ORAL at 21:39

## 2020-07-29 RX ADMIN — ACETAMINOPHEN 650 MG: 325 TABLET, FILM COATED ORAL at 21:39

## 2020-07-29 RX ADMIN — HYDROMORPHONE HYDROCHLORIDE 0.5 MG: 1 INJECTION, SOLUTION INTRAMUSCULAR; INTRAVENOUS; SUBCUTANEOUS at 08:41

## 2020-07-29 RX ADMIN — Medication 10 ML: at 09:00

## 2020-07-29 RX ADMIN — HYDROMORPHONE HYDROCHLORIDE 0.5 MG: 1 INJECTION, SOLUTION INTRAMUSCULAR; INTRAVENOUS; SUBCUTANEOUS at 07:57

## 2020-07-29 RX ADMIN — SODIUM CHLORIDE 1000 ML: 9 INJECTION, SOLUTION INTRAVENOUS at 12:45

## 2020-07-29 RX ADMIN — SODIUM CHLORIDE 125 MG: 900 INJECTION, SOLUTION INTRAVENOUS at 10:01

## 2020-07-29 RX ADMIN — SODIUM CHLORIDE, SODIUM LACTATE, POTASSIUM CHLORIDE, AND CALCIUM CHLORIDE: 600; 310; 30; 20 INJECTION, SOLUTION INTRAVENOUS at 06:58

## 2020-07-29 NOTE — PROGRESS NOTES
Assessed arterial line so that we could draw labs but line wouldn't flush or draw. Took dressing off and found catheter to be kinked so line straightened and redressed and labs drawn and sent.

## 2020-07-29 NOTE — CONSULTS
Maternal Fetal Medicine Consult Note      Requesting SKY Colón    Chief Complaint:  Pregnancy and vaginal bleeding . History of Present Illness: 40 y.o.  at 32w2d gestation who presents with abdominal pain with vaginal bleeding. Early this am, pt noticed vaginal bleeding while in bed. Came to hospital immediately. In intervening period abdominal pain developed. Upon arrival noted to have moderate vaginal bleeding, anhydramnios, and absent fetal cardiac activity. Initial BP mildly elevated, with subsequent hypotension. Decision made to proceed with emergent  for maternal health. BP has remained normal-mild range postoperatively. No pressor support required. Pt is Restorationism but agrees to have blood products if necessary to save her life. At this time, pt is stable, no DIC. Recent diagnosis with A1GDM, per report normal glucoses with elevated parameters. Upper normotensive/mild range BP with normal HELLP labs last week and upon admission. This does not appear c/w preeclampsia/HELLP syndrome. AMA with normal NT and Anatomy; however new fetal growth lag developed in past month. No NIPT this pregnancy. Examination of fetus postmortem normal anatomy and growth. No obvious abnormalities. No fetal etiology of demise noted. Pt denies drug use. UDS to be sent/cord sample. No COVID-19 symptoms. In ICU to monitor post-operative course.     OB History    Para Term  AB Living   2 2 1 1 0 1   SAB TAB Ectopic Molar Multiple Live Births   0 0 0 0 0 1      # Outcome Date GA Lbr Dewayne/2nd Weight Sex Delivery Anes PTL Lv   2  20 32w2d  3 lb 2 oz (1.417 kg)  CS-LTranv Gen Y FD      Complications: Uterine Rupture   1 Term 05 42w0d  7 lb 6 oz (3.345 kg) M Vag-Spont   MARTHA      Birth Comments: [facility] ([facility])     Past Surgical History:   Procedure Laterality Date    HX WISDOM TEETH EXTRACTION       Past Medical History: Diagnosis Date    Anemia     has never had blood transfusion    Anxiety     Chlamydia     Depression     Genital herpes 2007    no issues since    Hypothyroidism     states \"borderline\"    LGSIL on Pap smear of cervix 3/19/2020     Family History   Problem Relation Age of Onset    Diabetes Mother     Hypertension Mother     Cancer Maternal Grandmother         Uterine cancer?     Cancer Paternal Grandmother         Lung Cancer- smoker     Social History     Socioeconomic History    Marital status: SINGLE     Spouse name: Not on file    Number of children: Not on file    Years of education: Not on file    Highest education level: Not on file   Occupational History    Not on file   Social Needs    Financial resource strain: Not on file    Food insecurity     Worry: Not on file     Inability: Not on file    Transportation needs     Medical: Not on file     Non-medical: Not on file   Tobacco Use    Smoking status: Never Smoker    Smokeless tobacco: Never Used   Substance and Sexual Activity    Alcohol use: Yes     Comment: prior to +UPT    Drug use: Not Currently    Sexual activity: Yes     Partners: Male     Birth control/protection: None   Lifestyle    Physical activity     Days per week: Not on file     Minutes per session: Not on file    Stress: Not on file   Relationships    Social connections     Talks on phone: Not on file     Gets together: Not on file     Attends Christian service: Not on file     Active member of club or organization: Not on file     Attends meetings of clubs or organizations: Not on file     Relationship status: Not on file    Intimate partner violence     Fear of current or ex partner: Not on file     Emotionally abused: Not on file     Physically abused: Not on file     Forced sexual activity: Not on file   Other Topics Concern    Not on file   Social History Narrative    Not on file     No Known Allergies    Current Facility-Administered Medications:     0.9% sodium chloride infusion 250 mL, 250 mL, IntraVENous, PRN, Manisha Ford MD    lactated Ringers infusion, 150 mL/hr, IntraVENous, CONTINUOUS, Vero Velásquez MD    sodium chloride (NS) flush 5-40 mL, 5-40 mL, IntraVENous, Q8H, Vero Velásquez MD    sodium chloride (NS) flush 5-40 mL, 5-40 mL, IntraVENous, PRN, Desmond Ganser, MD    ibuprofen (MOTRIN) tablet 800 mg, 800 mg, Oral, Q6H PRN, Desmond Ganser, MD    oxyCODONE-acetaminophen (PERCOCET 7.5) 7.5-325 mg per tablet 1 Tab, 1 Tab, Oral, Q4H PRN, Desmond Ganser, MD    oxyCODONE-acetaminophen (PERCOCET 7.5) 7.5-325 mg per tablet 2 Tab, 2 Tab, Oral, Q4H PRN, Desmond Ganser, MD    naloxone [facility]) injection 0.4 mg, 0.4 mg, IntraVENous, PRN, Desmond Ganser, MD    [facility]) tablet 80 mg, 80 mg, Oral, AC&HS, Desmond Ganser, MD    docusate sodium (COLACE) capsule 100 mg, 100 mg, Oral, BID, Desmond Ganser, MD    diphenhydrAMINE (BENADRYL) capsule 25 mg, 25 mg, Oral, Q4H PRN, Desmond Ganser, MD    rho D immune globulin (RHOGAM) 1,500 unit (300 mcg) injection 0.3 mg, 300 mcg, IntraMUSCular, ONCE PRN, Desmond Ganser, MD    zolpidem (AMBIEN) tablet 5 mg, 5 mg, Oral, QHS PRN, Desmond Ganser, MD    HYDROmorphone (PF) (DILAUDID) injection 0.5 mg, 0.5 mg, IntraVENous, Q10MIN PRN, Donavon Mendez MD, 0.5 mg at 07/29/20 0841    sodium chloride (NS) flush 5-40 mL, 5-40 mL, IntraVENous, Q8H, Georgie Saldivar MD    sodium chloride (NS) flush 5-40 mL, 5-40 mL, IntraVENous, PRN, Georgie Saldivar MD    acetaminophen (TYLENOL) tablet 650 mg, 650 mg, Oral, Q6H PRN **OR** acetaminophen (TYLENOL) suppository 650 mg, 650 mg, Rectal, Q6H PRN, Georgie Saldivar MD    polyethylene glycol (MIRALAX) packet 17 g, 17 g, Oral, DAILY PRN, Georgie Saldivar MD    promethazine (PHENERGAN) tablet 12.5 mg, 12.5 mg, Oral, Q6H PRN **OR** ondansetron (ZOFRAN) injection 4 mg, 4 mg, IntraVENous, Q6H PRN, Georgie Saldivar MD    famotidine (PEPCID) tablet 20 mg, 20 mg, Oral, Q24H, Buster Fall MD    HYDROmorphone (PF) (DILAUDID) injection 1 mg, 1 mg, IntraVENous, Q4H PRN, Bibiana Saldivar MD    naloxone [facility]) injection 0.4 mg, 0.4 mg, IntraVENous, PRN, Bibiana Saldivar MD    ferric gluconate (FERRLECIT) 125 mg in 0.9% sodium chloride 100 mL IVPB, 125 mg, IntraVENous, DAILY, Bibiana Saldivar MD, Last Rate: 110 mL/hr at 07/29/20 1001, 125 mg at 07/29/20 1001    ketorolac (TORADOL) injection 30 mg, 30 mg, IntraVENous, Q6H PRN, Buster Fall MD    Review of Systems  A comprehensive review of systems was negative except for that written in the HPI. Objective:     Vitals:     Patient Vitals for the past 24 hrs:   Temp Pulse Resp BP SpO2   07/29/20 1056  67 24 (!) 61/34 97 %   07/29/20 1053  73 26 (!) 62/33 94 %   07/29/20 1047  67 23 (!) 83/47 99 %   07/29/20 1045  68 20 (!) 69/39 100 %   07/29/20 1042  70 16 (!) 56/38 99 %   07/29/20 1030  76 (!) 31 (!) 74/44 99 %   07/29/20 1015  71 (!) 35 96/59 99 %   07/29/20 0945  73 24 104/61 99 %   07/29/20 0915  72 15 111/72 98 %   07/29/20 0900  72 18 134/76 99 %   07/29/20 0845  69 21 124/75 98 %   07/29/20 0830  64 20 125/72 98 %   07/29/20 0815  62 27 144/83 99 %   07/29/20 0800  64 (!) 41 133/84 100 %   07/29/20 0758 98.3 °F (36.8 °C) 60 24 150/75 100 %   07/29/20 0753  60 20  100 %   07/29/20 0748  65 21  98 %   07/29/20 0744  64 23 141/77 99 %   07/29/20 0737 98.6 °F (37 °C) 61 15 172/84 99 %   07/29/20 0626  (!) 52  (!) 76/46    07/29/20 0624  (!) 55  (!) 75/44 100 %   07/29/20 0619     100 %   07/29/20 0614     100 %   07/29/20 0609     99 %   07/29/20 0604     100 %   07/29/20 0559     99 %   07/29/20 0554  77  139/75 100 %        I&O:  07/29 0701 - 07/29 1900  In: 1700 [I.V.:1700]  Out: 965 [Urine:60]            No intake/output data recorded.   Output by Drain (mL) 07/27/20 0701 - 07/27/20 1900 07/27/20 1901 - 07/28/20 0700 07/28/20 0701 - 07/28/20 1900 07/28/20 1901 - 07/29/20 0700 07/29/20 0701 - 07/29/20 1137   Patient has no LDAs of requested type attached. Exam:   Patient without distress. Abdomen: soft, appropriately tender. Lower Extremity Edema: 1+  Skin: normal coloration and turgor, no rashes, no suspicious skin lesions noted.    Neurologic: negative  Psychiatric: appropriate grief     Labs:   CBC:    Recent Labs     07/29/20  1119 07/29/20  0954 07/29/20  0700 07/29/20  0630 07/16/20  1019 06/25/20  1056 02/18/20  1342 02/18/20   WBC 18.2* 24.0* 15.3* 15.3* 11.6*  --  11.0*  --    HGB 6.8* 8.2* 8.5* 10.8* 11.4 10.1* 11.8  --    HCT 21.6* 25.3* 27.3* 35.9 36.3  --  34.4  --    * 180 174 229 372  --  352  --    HGBEXT  --   --   --   --   --   --   --  11.8   HCTEXT  --   --   --   --   --   --   --  34.4   PLTEXT  --   --   --   --   --   --   --  352       CMP:   Recent Labs     07/29/20  0954 07/29/20  0700 07/29/20  0630 07/16/20  1019 07/02/20  0818    135* 135* 137  --    K 4.1 4.6 4.0 4.5  --    * 109* 108* 106  --    CO2 18* 17* 17* 18*  --    AGAP 9 9 10  --   --    * 122* 119* 85 81   BUN 24* 21 22 13  --    CREA 1.00 0.97 0.88 0.78  --    GFRAA >60 >60 >60 107  --    GFRNA >60 >60 >60 93  --    CA 7.6* 7.6* 8.8 9.4  --    ALB  --   --  2.6* 3.6*  --    TP  --   --  6.7 6.1  --    GLOB  --   --  4.1*  --   --    AGRAT  --   --  0.6* 1.4  --    ALT  --   --  15 14  --        Recent Labs     07/29/20  0954 07/29/20  0700 07/29/20  0630 07/16/20  1019   URICA 4.8 4.5 4.8 5.0   LDH  --  223*  --  159       COAGS:    Recent Labs     07/29/20 0630   APTT 27.6   PTP 13.4   INR 1.0       Recent Glucose Results: Recent Glucose Results:   Recent Labs     07/29/20  0954 07/29/20  0701 07/29/20  0700 07/29/20  0630 07/16/20  1019 07/02/20  0818   *  --  122* 119* 85 80   GLUCPOC  --  73 57*  --   --   --      Prenatal Labs:    Lab Results   Component Value Date/Time    Rubella, External Immune 02/18/2020    HBsAg, External Negative 02/18/2020 HIV, External Non-Reactive 2020    RPR, External Non-Reactive 2020    Gonorrhea, External negative 2020    Chlamydia, External negative 2020     Assessment and Plan:  40 y.o.  at 32w2d with     Patient Active Problem List    Diagnosis    Antepartum placental abruption    Fetal demise in ervin pregnancy greater than 22 weeks gestation, antepartum    Multigravida of advanced maternal age in third trimester     Referred to [facility]   28 week GTT-171  HGB-10.1-start daily iron OTC  3 hr GTT-  3/9/2020 at [facility]:  Normal NT and nasal bone. Declines genetic testing at this time due to financial concerns- will look into cost based on her deductible/insurance/income. Genetic counseling done by genetic counselor and MD.  2020 at [facility]:  Normal anatomy with limited fetal Echo due to fetal position. 2020 at [facility]: New finding of lagging fetal growth, shortened long bones. I am concerned that this finding, AMA and EIF increases risk of Trisomy-21. This was explained to the patient. IUFD due to catastrophic abruption 2020         Hereditary disease in family possibly affecting fetus     States FOB son was born with heart defect- type or surgical hx unknown  FOB has SS trait per patient      Poor fetal growth, affecting management of mother, antepartum condition or complication    Elevated blood pressure affecting pregnancy, antepartum     Pre-E labs-WNL  P/C ratio-129 20 at [facility]:  BP mildly elevated at 136/82 (has been using Afrin nasal spray), no preeclamptic symptoms. PreE workup on --->  HELLP WNL; p/c ratio 129.  Diet controlled gestational diabetes mellitus (GDM) in third trimester     Failed 3 hr GTT.    2020 at [facility]:  Normal f/u Echo with limited views of ventricular septum. New finding of Fetal growth restriction, normal ZAINAB; reassuring fetal status. AC 1%, overall 14%, ZAINAB WNL, UA Dopplers WNL, BPP . Patient here for new GDM. Went to [facility] on 7/22. Reports FBG's normally 88-95 except this AM was 100 and 1hPP usually 117-126 except when experimenting with certain foods had a 425. Discussed low carb diet with increased protein. Will do elevated parameters due to FGR today.  Thyroid dysfunction in pregnancy, antepartum     3/13/20-TSH 0.007  T4-2.07  3/16/20-start methimazole 5 mg tid. Recheck 4 weeks. TSH-  T4-    States hx of \"borderline\" thyroid- checked TSH/Free T4 with PN labs  2/18/20 TSH (0.018) Free T4 (2.25)  Per Dr. Tyson Public- no treatment for now recheck in 2 weeks @ NOB exam  TSH/Free T4 @ 3/13/20 visit  3/9/2020 at [facility]:  Pt with mild hyperthyroidism on NOB labs; Pt has been self-treating with \"herbal thyroid remedy recommended by person at store based on my symptoms. \" She has 10 year history of periods of hair loss followed by regrowth (Has wig on today). Was recently told by someone \"in a waiting room\" that she had a goiter. Pt to bring herbal remedy to primary OB appt Friday. Recheck TSH, FT4, FT3 at OB appt  No goiter on exam.   4/29/2020 at [facility]:  Taking Methimazole 5 mg TID. Most recent TFT's drawn on 3/13, see above. No symptoms at this time. 5/21/20-TSH-0.075  T4-1.36-cont current dose  6/10/2020 at [facility]: Taking Methimazole 5 mg TID. Most recent TFT WNL on 5/1.  7/23/2020 at [facility]:  Continues to take Methimazole 5 mg TID. No recent labs since 5/21, see above.  Back pain affecting pregnancy in third trimester     6/10/2020 at [facility]: Severe sciatic back pain radiating down left leg. Magnesium oil topically helps some. Has history but standing at work has made the pain worse. Extensive review of symptomatic care options.        Obesity affecting pregnancy in third trimester     See GDM Overview      LGSIL on Pap smear of cervix     3/13/20 HR HPV positive- needs Colposcopy      Uterine fibroid in pregnancy     3/9/2020 at [facility]: unlikely to have clinical significance  6/10/2020 at [facility]: Stable    See GDM Overview      H/O herpes simplex infection     HSV Type 2 positive & admits to hx of outbreak  Will need Valtrex @ 36 weeks ___  3/9/2020 at [facility]:    · Valtrex 500 mg prophylactic             IUFD with catastrophic abruption. Unclear etiology. HTN may be cause, but overall picture is not c/w severe preeclamptic abruption. As no good explanation, will have COVID testing as it may impact placentation. BPP 8/8 yesterday. Will offer genetic testing ([employer] pregnancy loss microarray kit) on placenta/cord blood. But normal fetal phenotype today. Repeat labs at 1300. Time:90 with >60 minutes critical care time  minutes spent on floor,with greater than 50% of the time examining patient, explaining plan and coordinating care with nurse and requesting primary physician.

## 2020-07-29 NOTE — ANESTHESIA POSTPROCEDURE EVALUATION
Procedure(s):   SECTION. No value filed. Anesthesia Post Evaluation      Multimodal analgesia: multimodal analgesia used between 6 hours prior to anesthesia start to PACU discharge  Patient location during evaluation: ICU  Patient participation: complete - patient participated  Level of consciousness: awake and alert  Pain management: adequate  Airway patency: patent  Anesthetic complications: no  Cardiovascular status: acceptable  Respiratory status: acceptable  Hydration status: acceptable  Post anesthesia nausea and vomiting:  none  Final Post Anesthesia Temperature Assessment:  Normothermia (36.0-37.5 degrees C)      INITIAL Post-op Vital signs:   Vitals Value Taken Time   /72 2020  9:15 AM   Temp     Pulse 69 2020  9:23 AM   Resp 18 2020  9:23 AM   SpO2 98 % 2020  9:23 AM   Vitals shown include unvalidated device data.

## 2020-07-29 NOTE — PROGRESS NOTES
Patient doing stable. Repeat Hgb 7.0 Plts 136 coags normal.  BP better making some urine. US shows some free fluid but not unexpected post c section. Discussed with Pt and mother that recommend transfusion PRBC's. She is hesitant to take due to beliefs. At this point is stable. Due to her concern about receiving blood and stable at this time discussed watching closely for now and repeat labs again in couple hours. Discussed with Dr Alexandria Jimenez.

## 2020-07-29 NOTE — PROGRESS NOTES
Late entry  Pt seen in triage and noted to have a placental abruption with no fetal heart tones. Pt had a sudden drop in bp and decision made to emergently proceed with csection. Informed pt of this decision and discussed giving blood as a life saving measure. Pt consented to blood. Taken emergently to OR and given general anesthesia for csection. OR note done by Dr. Malou Trimble. Phanestil  incision done- on entry to abdomen noted uterus to be blue in color, when opening uterus, large clot of blood immediately seen;   Clear amniotic fluid  Baby delivered with no tone or evidence of breathing, nicu present  Placenta delivered- appeared to be complete abruption  Uterus closed with double layer vicryl. Hemostatic. After uterine closure, Dr. Malou Trimble completed case as he as documented.

## 2020-07-29 NOTE — PROGRESS NOTES
Pt arrived to ICU via bed from OR with CRNA at bedside as well. Lulu Berumen RN to bedside to assist with care. Report given, all questions answered. Patient is awake and complains of pain. No other complaints. Fundus is firm with small amount rubra lochia. Vital signs obtained. Will continue to monitor.

## 2020-07-29 NOTE — PROGRESS NOTES
Ultrasound is here and Dr. Gladis Hernandez here and discussing the possibility of receiving blood products. Patient is now wanting to wait and see what ultrasound shows.

## 2020-07-29 NOTE — PROGRESS NOTES
Patient stated she couldn't breathe. Placed on nc 4 lpm.  L/D nurse here massaging fundus. Dr Ginna Johnson is speaking with DR. Dillard

## 2020-07-29 NOTE — PROGRESS NOTES
Received information concerning patient and fetal demise from [facility]. Talked with staff and took up white beau baby basket with bereavement items. I talked at length with mother of patient who was in surgery. I was also present when boyfriend came. I took mother and boyfriend to ICU so that they could be with patient. Prayer and support given. Patient tearful. I will continue to support as needed. Mariajose Portillo M.Div.

## 2020-07-29 NOTE — PROGRESS NOTES
7557:  Pt arrived in [facility] with complaints of extreme abdominal pain, heavy bleeding. Reports filled toilet at home with blood. Placed on EFM. Unable to find  [address]. Dr. Cristal Davis called to the bedside.    0601: Dr. Cristal Davis at the beside. SVE 1/thick    0602: Attempting to start IV. Unable to access and draw labs. Finally #22 placed and labs drawn. 3131:  Dr. Cristal Davis did ultrasound at bedside. See her notes    0620: Dr. Cristal Davis informed patient's mother and patient that child was  and that pt was losing a lot of blood. Pt and her mothers states that she is Yazidi. States she does not want to receive blood but began crying and shaking her head. Dr. Cristal Davis informed patient that there was a possibility that if she did not take blood it could cost her her life. Pt stated \"I am 40years old and have another child. I have to live. \" Dr. Cristal Davis asked are you  Okay with the blood? I am going to give you blood. Pt cried and asked to speak to her mom who had left the room. Dr. Cristal Davis asked pt again and said to her that this was her decision. Pt shook her head yes and Dr. Cristal Davis said is that yes and pt said yes. 0846:  C/S called. Dr Paula Mancia states not to move pt yet because he is in an emergency downstairs    4186:  Dr. Heraclio Butlerots at bedside.  Bed pulled out and pt taken to OR

## 2020-07-29 NOTE — PROGRESS NOTES
Patient found to be hypotensive. NS 100ml on pressure bag going in as a bolus. MD being called. L/D nurse present and it was discussed about patient receiving blood if she needed it. She agreed to receive it if \"absolutely necessary\". Patient was type and crossed.

## 2020-07-29 NOTE — H&P
History & Physical    Name: Ene Mcqueen MRN: [id number]  SSN: [id number]    YOB: 1975  Age: 40 y.o. Sex: female      Subjective:     Reason for Admission:  Pregnancy and Placental Abruption    History of Present Illness: Ms. Jamie Renae is a 40 y.o.  female with an estimated gestational age of 33w3d with Estimated Date of Delivery: 20. Patient complains of moderate vaginal bleeding beginning this am. Pt says she was in bed looking at her phone and feeling well when she felt something wet going down her leg. She noticed blood and her mother immediately drove her to [address] [place] [address]. Now that she is here she is complaining of abdominal discomfort- generally cannot get comfortable and complaining of being hot. Noticed on her menstrual pad on arrival is only a moderate amount of blood. Pad is barely soaked with no obvious clots. Pregnancy has been complicated by advanced maternal age, diabetes - gestational, elevated blood pressure in physician's office  and hyperthyroidism. . Patient denies chest pain, fever, nausea and vomiting, right upper quadrant pain  , shortness of breath and swelling.     OB History    Para Term  AB Living   2 2 1 1 0 1   SAB TAB Ectopic Molar Multiple Live Births   0 0 0 0 0 1      # Outcome Date GA Lbr Dewayne/2nd Weight Sex Delivery Anes PTL Lv   2  20 32w2d  1.417 kg  CS-LTranv Gen Y FD      Complications: Uterine Rupture   1 Term 05 42w0d  3.345 kg M Vag-Spont   MARTHA      Birth Comments: [facility] [facility]     Past Medical History:   Diagnosis Date    Anemia     has never had blood transfusion    Anxiety     Chlamydia     Depression     Genital herpes     no issues since    Hypothyroidism     states \"borderline\"    LGSIL on Pap smear of cervix 3/19/2020     Past Surgical History:   Procedure Laterality Date    HX WISDOM TEETH EXTRACTION       Social History     Occupational History    Not on file   Tobacco Use    Smoking status: Never Smoker    Smokeless tobacco: Never Used   Substance and Sexual Activity    Alcohol use: Yes     Comment: prior to +UPT    Drug use: Not Currently    Sexual activity: Yes     Partners: Male     Birth control/protection: None      Family History   Problem Relation Age of Onset    Diabetes Mother     Hypertension Mother     Cancer Maternal Grandmother         Uterine cancer?  Cancer Paternal Grandmother         Lung Cancer- smoker       No Known Allergies  Prior to Admission medications    Medication Sig Start Date End Date Taking? Authorizing Provider   Blood-Glucose Meter monitoring kit Check BS 4 x daily. Fasting and one hour after each meal. Please provide meter insurance will cover. 20   Savannah Adams NP   glucose blood VI test strips (blood glucose test) strip Check BS 4 x daily. Fasting and one hour after each meal. 20   Savannah Adams NP   lancets misc Check BS 4 x daily. 20   Savannah Adams NP   methIMAzole (TAPAZOLE) 5 mg tablet Take 1 Tab by mouth every eight (8) hours. 20   Glenn Crane MD   aspirin 81 mg chewable tablet Take 162 mg by mouth daily. Provider, Historical   multivit 47/iron/folate 1/dha (PNV-DHA PO) Take  by mouth. Provider, Historical        Review of Systems:  A comprehensive review of systems was negative except for that written in the History of Present Illness. a 12 point review of systems negative. Objective:     Vitals:    Vitals:    20 0744 20 0748 20 0758 20 0800   BP: 141/77  150/75 133/84   Pulse: 64 65 60 64   Resp: 23 21  (!) 41   Temp:   98.3 °F (36.8 °C)    SpO2: 99% 98%  100%      Temp (24hrs), Av.5 °F (36.9 °C), Min:98.3 °F (36.8 °C), Max:98.6 °F (37 °C)    BP  Min: 75/44  Max: 172/84     Physical Exam:  Patient c/o feeling hot and appearing restless and uncomfortable. Normal respiratory effort. Alert.  Talking coherently and without difficulty speaking  Heart: Regular rate and rhythm  Lung: clear to auscultation throughout lung fields, no wheezes, no rales, no rhonchi and normal respiratory effort  Abdomen: soft, nontender  Fundus: firm and moderately tender  Perineum: blood present, amniotic fluid absent  Cervical Exam: 1 cm dilated    50% effaced    -3 station    Presenting Part: cephalic  Lower Extremities:  - Edema No   - Patellar Reflexes: 2+ bilaterally     Membranes:  Intact  Uterine Activity: not on toco  Fetal Heart Rate:  No fetal heart rate detected with doppler or with ultrasound  Neuro: pt awake, alert, oriented, restless, complaining of being hot    Lab/Data Review:  Recent Results (from the past 24 hour(s))   AMB POC OB URINE DIP    Collection Time: 07/28/20  1:59 PM   Result Value Ref Range    Glucose (UA POC) Negative Negative    Protein (UA POC) 2+ Negative   METABOLIC PANEL, COMPREHENSIVE    Collection Time: 07/29/20  6:30 AM   Result Value Ref Range    Sodium 135 (L) 136 - 145 mmol/L    Potassium 4.0 3.5 - 5.1 mmol/L    Chloride 108 (H) 98 - 107 mmol/L    CO2 17 (L) 21 - 32 mmol/L    Anion gap 10 7 - 16 mmol/L    Glucose 119 (H) 65 - 100 mg/dL    BUN 22 6 - 23 MG/DL    Creatinine 0.88 0.6 - 1.0 MG/DL    GFR est AA >60 >60 ml/min/1.73m2    GFR est non-AA >60 >60 ml/min/1.73m2    Calcium 8.8 8.3 - 10.4 MG/DL    Bilirubin, total 0.3 0.2 - 1.1 MG/DL    ALT (SGPT) 15 12 - 65 U/L    AST (SGOT) 19 15 - 37 U/L    Alk.  phosphatase 94 50 - 136 U/L    Protein, total 6.7 6.3 - 8.2 g/dL    Albumin 2.6 (L) 3.5 - 5.0 g/dL    Globulin 4.1 (H) 2.3 - 3.5 g/dL    A-G Ratio 0.6 (L) 1.2 - 3.5     CBC WITH AUTOMATED DIFF    Collection Time: 07/29/20  6:30 AM   Result Value Ref Range    WBC 15.3 (H) 4.3 - 11.1 K/uL    RBC 3.90 (L) 4.05 - 5.2 M/uL    HGB 10.8 (L) 11.7 - 15.4 g/dL    HCT 35.9 35.8 - 46.3 %    MCV 92.1 79.6 - 97.8 FL    MCH 27.7 26.1 - 32.9 PG    MCHC 30.1 (L) 31.4 - 35.0 g/dL    RDW 17.6 (H) 11.9 - 14.6 %    PLATELET 683 672 - 815 K/uL    MPV 9.8 9.4 - 12.3 FL    ABSOLUTE NRBC 0.02 0.0 - 0.2 K/uL    DF AUTOMATED      NEUTROPHILS 44 43 - 78 %    LYMPHOCYTES 45 (H) 13 - 44 %    MONOCYTES 6 4.0 - 12.0 %    EOSINOPHILS 4 0.5 - 7.8 %    BASOPHILS 0 0.0 - 2.0 %    IMMATURE GRANULOCYTES 1 0.0 - 5.0 %    ABS. NEUTROPHILS 6.8 1.7 - 8.2 K/UL    ABS. LYMPHOCYTES 6.8 (H) 0.5 - 4.6 K/UL    ABS. MONOCYTES 0.9 0.1 - 1.3 K/UL    ABS. EOSINOPHILS 0.6 0.0 - 0.8 K/UL    ABS. BASOPHILS 0.1 0.0 - 0.2 K/UL    ABS. IMM.  GRANS. 0.1 0.0 - 0.5 K/UL   PROTHROMBIN TIME + INR    Collection Time: 07/29/20  6:30 AM   Result Value Ref Range    Prothrombin time 13.4 12.0 - 14.7 sec    INR 1.0     PTT    Collection Time: 07/29/20  6:30 AM   Result Value Ref Range    aPTT 27.6 24.3 - 35.4 SEC   FIBRINOGEN    Collection Time: 07/29/20  6:30 AM   Result Value Ref Range    Fibrinogen 265 190 - 501 mg/dL   URIC ACID    Collection Time: 07/29/20  6:30 AM   Result Value Ref Range    Uric acid 4.8 2.6 - 6.0 MG/DL   POC CG8I    Collection Time: 07/29/20  7:00 AM   Result Value Ref Range    Device: OTHER      pH (POC) 6.61 (LL) 7.35 - 7.45      pCO2 (POC) >130.0 (HH) 35 - 45 MMHG    pO2 (POC) <5 (LL) 75 - 100 MMHG    HCO3 (POC) Cannot be calculated 22 - 26 MMOL/L    sO2 (POC) Cannot be calculated 95 - 98 %    Allens test (POC) NOT APPLICABLE      Site CORD      Specimen type (POC) ARTERIAL CORD      Sodium,  (L) 136 - 145 MMOL/L    Potassium, POC 6.4 (H) 3.5 - 5.1 MMOL/L    Glucose, POC 57 (L) 65 - 100 MG/DL    Calcium, ionized (POC) 1.44 (H) 1.12 - 1.32 mmol/L    Performed by [place]     COLLECT TIME 751     CBC WITH AUTOMATED DIFF    Collection Time: 07/29/20  7:00 AM   Result Value Ref Range    WBC 15.3 (H) 4.3 - 11.1 K/uL    RBC 3.07 (L) 4.05 - 5.2 M/uL    HGB 8.5 (L) 11.7 - 15.4 g/dL    HCT 27.3 (L) 35.8 - 46.3 %    MCV 88.9 79.6 - 97.8 FL    MCH 27.7 26.1 - 32.9 PG    MCHC 31.1 (L) 31.4 - 35.0 g/dL    RDW 17.3 (H) 11.9 - 14.6 %    PLATELET 119 757 - 049 K/uL    MPV 9.6 9.4 - 12.3 FL    ABSOLUTE NRBC 0.03 0.0 - 0.2 K/uL    NEUTROPHILS 51 43 - 78 %    LYMPHOCYTES 38 13 - 44 %    MONOCYTES 7 4.0 - 12.0 %    EOSINOPHILS 3 0.5 - 7.8 %    BASOPHILS 0 0.0 - 2.0 %    IMMATURE GRANULOCYTES 1 0.0 - 5.0 %    ABS. NEUTROPHILS 7.7 1.7 - 8.2 K/UL    ABS. LYMPHOCYTES 5.8 (H) 0.5 - 4.6 K/UL    ABS. MONOCYTES 1.1 0.1 - 1.3 K/UL    ABS. EOSINOPHILS 0.5 0.0 - 0.8 K/UL    ABS. BASOPHILS 0.0 0.0 - 0.2 K/UL    ABS. IMM.  GRANS. 0.2 0.0 - 0.5 K/UL    RBC COMMENTS SLIGHT  ANISOCYTOSIS + POIKILOCYTOSIS        RBC COMMENTS OCCASIONAL  POLYCHROMASIA        WBC COMMENTS Result Confirmed By Smear      PLATELET COMMENTS ADEQUATE      DF AUTOMATED     TSH 3RD GENERATION    Collection Time: 07/29/20  7:00 AM   Result Value Ref Range    TSH 1.820 uIU/mL   LD    Collection Time: 07/29/20  7:00 AM   Result Value Ref Range     (H) 100 - 033 U/L   METABOLIC PANEL, BASIC    Collection Time: 07/29/20  7:00 AM   Result Value Ref Range    Sodium 135 (L) 136 - 145 mmol/L    Potassium 4.6 3.5 - 5.1 mmol/L    Chloride 109 (H) 98 - 107 mmol/L    CO2 17 (L) 21 - 32 mmol/L    Anion gap 9 7 - 16 mmol/L    Glucose 122 (H) 65 - 100 mg/dL    BUN 21 6 - 23 MG/DL    Creatinine 0.97 0.6 - 1.0 MG/DL    GFR est AA >60 >60 ml/min/1.73m2    GFR est non-AA >60 >60 ml/min/1.73m2    Calcium 7.6 (L) 8.3 - 10.4 MG/DL   URIC ACID    Collection Time: 07/29/20  7:00 AM   Result Value Ref Range    Uric acid 4.5 2.6 - 6.0 MG/DL   POC CG8I    Collection Time: 07/29/20  7:01 AM   Result Value Ref Range    Device: OTHER      pH (POC) 6.71 (LL) 7.35 - 7.45      pCO2 (POC) 129.9 (HH) 35 - 45 MMHG    pO2 (POC) 20 (LL) 75 - 100 MMHG    HCO3 (POC) 16.5 (L) 22 - 26 MMOL/L    sO2 (POC) 9 (L) 95 - 98 %    Base deficit (POC) 23 mmol/L    Allens test (POC) NOT APPLICABLE      Site CORD      Specimen type (POC) VENOUS CORD      Sodium,  (L) 136 - 145 MMOL/L    Potassium, POC 6.4 (H) 3.5 - 5.1 MMOL/L    Glucose, POC 73 65 - 100 MG/DL    Calcium, ionized (POC) 1.60 (H) 1.12 - 1.32 mmol/L    Performed by [place]     COLLECT TIME 751         Assessment and Plan: Active Problems:    Antepartum placental abruption (7/29/2020)       Hyperthyroidism, advanced maternal age, uterine fibroids, pre eclampsia    Pt presented to [facility] triage with new onset of vaginal bleeding (occurring just prior to arrival). No fetal heart tones auscultated or seen on bedside ultrasound. Pt had a firm and painful uterus consistent with placental abruption. BP decreased from 135/75 to 75/44. Decision was made at that time to proceed with emergency csection for placental abruption , fetal demise remote from delivery. Discussed with pt that she had a placental abruption and that blood could be necessary to save her life. Pt consented verbally to blood products- only if needed to save her life. Pt was clear in her decision and was awake alert and oriented at time of discussion. She would take blood products if that was necessary to save her life. From there proceeded emergently to OR for csection.

## 2020-07-29 NOTE — PROGRESS NOTES
0478 In room with patient while discussion occurred regarding patients acceptance of blood products. Patient verbalized \"I have another child and I need to live\" When asked again to verify she was stating that in a life saving event she would accept receiving blood products she confirmed that \"yes\" she agrees to receive if needed to live.

## 2020-07-29 NOTE — ANESTHESIA PREPROCEDURE EVALUATION
Relevant Problems   No relevant active problems       Anesthetic History               Review of Systems / Medical History  Patient summary reviewed and pertinent labs reviewed    Pulmonary                   Neuro/Psych         Psychiatric history     Cardiovascular                       GI/Hepatic/Renal                Endo/Other    Diabetes: type 2  Hypothyroidism  Morbid obesity     Other Findings   Comments: Fetal demise  Abruptio placenta  Jehovah witness will take blood if life saving           Physical Exam    Airway  Mallampati: II  TM Distance: > 6 cm  Neck ROM: normal range of motion   Mouth opening: Normal     Cardiovascular  Regular rate and rhythm,  S1 and S2 normal,  no murmur, click, rub, or gallop  Rhythm: regular  Rate: normal         Dental  No notable dental hx       Pulmonary  Breath sounds clear to auscultation               Abdominal         Other Findings            Anesthetic Plan    ASA: 3, emergent  Anesthesia type: general    Monitoring Plan: Arterial line        Anesthetic plan and risks discussed with: Patient

## 2020-07-29 NOTE — L&D DELIVERY NOTE
Delivery Summary    Patient: Martin Cummings MRN: [id number]  SSN: [id number]    YOB: 1975  Age: 40 y.o. Sex: female        Information for the patient's :  Jack Eh Pending FD [[id number]]       Labor Events:    Labor: Yes    Steroids: None   Cervical Ripening Date/Time:       Cervical Ripening Type: None   Antibiotics During Labor: No   Rupture Identifier:      Rupture Date/Time:       Rupture Type: AROM   Amniotic Fluid Volume: Moderate    Amniotic Fluid Description: Clear    Amniotic Fluid Odor: None    Induction: None       Induction Date/Time:        Indications for Induction:      Augmentation: None   Augmentation Date/Time:      Indications for Augmentation:     Labor complications: Uterine Rupture       Additional complications:        Delivery Events:  Indications For Episiotomy:     Episiotomy: None   Perineal Laceration(s): None   Repaired:     Periurethral Laceration Location:      Repaired:     Labial Laceration Location:     Repaired:     Sulcal Laceration Location:     Repaired:     Vaginal Laceration Location:     Repaired:     Cervical Laceration Location:     Repaired:     Repair Suture: None   Number of Repair Packets:     Estimated Blood Loss (ml):  ml   Quantitaive Blood Loss (ml):             Delivery Date: 2020    Delivery Time: 6:51 AM   Delivery Type: , Low Transverse     Details    Trial of Labor: No   Primary/Repeat: Primary   Priority: Emergency   Indications:   Other (Add Comments) Placental Abruption     Sex:       Gestational Age: 32w2d  Delivery Clinician:  Navneet Ayala  Living Status: Fetal Demise   Delivery Location: OR OR 2          APGARS  One minute Five minutes Ten minutes   Skin color: 0   0   0     Heart rate: 0   0   0     Grimace: 0   0   0     Muscle tone: 0   0   0     Breathin   0   0     Totals: 0   0   0       Presentation:      Position:        Resuscitation Method:  None     Meconium Stained: None      Cord Information: 3 Vessels  Complications:    Cord around:    Delayed cord clamping? No  Cord clamped date/time:   Disposition of Cord Blood: Lab    Blood Gases Sent?: Yes    Placenta:  Date/Time: 2020  6:52 AM  Removal: Manual Removal      Appearance:        Measurements:  Birth Weight: 1.417 kg      Birth Length: 40.6 cm      Head Circumference:        Chest Circumference:       Abdominal Girth: Other Providers:   NKECHI BAREU;EVELINE PHOENIX;LOBO GARCIA;CARLITOS WONG;STEVE DAIGLE;SITA CALVILLO;CELESTE CARBALLO;DEDE ENNIS;KAYLYN ENCISO;MIGUEL AMADOR;BERTIN CARROLL;TIMMY LIAO;KATIUSKA LORD, Obstetrician;Primary Nurse;Neonatologist;Anesthesiologist;Crna;Scrub Tech;Primary Nurse;Scrub Tech;Respiratory Therapist;Staff Nurse;Staff Nurse; Obstetrician;Staff Nurse             Group B Strep: No results found for: GRBSEXT, GRBSEXT  Information for the patient's :  Abrahan Holden Pending FD [[id number]]   No results found for: ABORH, PCTABR, PCTDIG, BILI, ABORHEXT, ABORH     Recent Labs     20  0701 20  0700   HCO3I 16.5* Cannot be calculated   SO2I 9* Cannot be calculated   IBD 23  --    SPECTI VENOUS CORD ARTERIAL CORD   ISITE CORD CORD   IDEV OTHER OTHER   IALLEN NOT APPLICABLE NOT APPLICABLE

## 2020-07-29 NOTE — PROGRESS NOTES
Neonatology Delivery Attendance    Requested to attend delivery by Dr. Ingrid Omalley for 32 week patient to confirm IUFD. Mother with placental abruption and no fetal heart tones present on admission. At delivery confirmed IUFD with no heart rate noted. At 1 and 5 minutes no activity noted and HR not present. Cord ABG pH 6.61, CO2 130, bicarbonate/deficit cannot be calculated, suggestive of chronic  asphyxia.

## 2020-07-29 NOTE — H&P
History & Physical    Name: Gi Gannon MRN: [id number]  SSN: [id number]    YOB: 1975  Age: 40 y.o. Sex: female      Subjective:     Estimated Date of Delivery: 20  OB History    Para Term  AB Living   2 2 1 1 0 1   SAB TAB Ectopic Molar Multiple Live Births   0 0 0 0 0 1      # Outcome Date GA Lbr Dewayne/2nd Weight Sex Delivery Anes PTL Lv   2  20 32w2d  1.417 kg  CS-LTranv Gen Y FD      Complications: Uterine Rupture   1 Term 05 42w0d  3.345 kg M Vag-Spont   MARTHA      Birth Comments: [address] [facility] [place], [place]       Ms. Jak Curtis is admitted with pregnancy at [place] [address] with heavy vaginal bleeding and abdominal pain onset early this morning. She was seen and evaluated emergently in [facility] triage and diagnosed with placental abruption, fetal demise remote from vaginal delivery. During the course of evaluation she was noted to decompensate hemodynamically and she was take for emergent  section. Prenatal course was complicated by diabetes - gestational. Please see prenatal records for details. Past Medical History:   Diagnosis Date    Anemia     has never had blood transfusion    Anxiety     Chlamydia     Depression     Genital herpes     no issues since    Hypothyroidism     states \"borderline\"    LGSIL on Pap smear of cervix 3/19/2020     Past Surgical History:   Procedure Laterality Date    HX WISDOM TEETH EXTRACTION       Social History     Occupational History    Not on file   Tobacco Use    Smoking status: Never Smoker    Smokeless tobacco: Never Used   Substance and Sexual Activity    Alcohol use: Yes     Comment: prior to +UPT    Drug use: Not Currently    Sexual activity: Yes     Partners: Male     Birth control/protection: None     Family History   Problem Relation Age of Onset    Diabetes Mother     Hypertension Mother     Cancer Maternal Grandmother         Uterine cancer?     Cancer Paternal Grandmother         Lung Cancer- smoker       No Known Allergies  Prior to Admission medications    Medication Sig Start Date End Date Taking? Authorizing Provider   Blood-Glucose Meter monitoring kit Check BS 4 x daily. Fasting and one hour after each meal. Please provide meter insurance will cover. 20   Cassandra Dancer, NP   glucose blood VI test strips (blood glucose test) strip Check BS 4 x daily. Fasting and one hour after each meal. 20   Cassandra Dancer, NP   lancets misc Check BS 4 x daily. 20   Cassandra Dancer, NP   methIMAzole (TAPAZOLE) 5 mg tablet Take 1 Tab by mouth every eight (8) hours. 20   Ab Moffett MD   aspirin 81 mg chewable tablet Take 162 mg by mouth daily. Provider, Historical   multivit 47/iron/folate 1/dha (PNV-DHA PO) Take  by mouth. Provider, Historical        Review of Systems    Objective:     Vitals:  Vitals:    20 0744 20 0748 20 0758 20 0800   BP: 141/77  150/75 133/84   Pulse: 64 65 60 64   Resp: 23 21  (!) 41   Temp:   98.3 °F (36.8 °C)    SpO2: 99% 98%  100%        Physical Exam:  Physical Exam  Cervical Exam: 1 cm dilated    50% effaced    -2 station    Membranes: Intact  Fetal Heart Rate: absent    Prenatal Labs:   Lab Results   Component Value Date/Time    Rubella, External Immune 2020    HBsAg, External Negative 2020    HIV, External Non-Reactive 2020    RPR, External Non-Reactive 2020    Gonorrhea, External negative 2020    Chlamydia, External negative 2020    ABO,Rh O positive 2020         Impression/Plan:     Active Problems:    Antepartum placental abruption (2020)         Plan:  Admitted for  section. Group B Strep was not tested. Discussed the risks of surgery including the risks of bleeding, infection, deep vein thrombosis, and surgical injuries to internal organs including but not limited to the bowels, bladder, rectum, and female reproductive organs.  The patient understands the risks; any and all questions were answered to the patient's satisfaction.

## 2020-07-29 NOTE — PROGRESS NOTES
Patient seen. Feels better. UOP increasing. BP stable. Re enforced dressing dry. TXA unavailable on back order. Discussed condition with patient and family and questions answered. Discussed autopsy and they need time to consider.   Will follow up on 2000 labs and if normal repeat in am.

## 2020-07-29 NOTE — OP NOTES
Michelle Zuniga  [id number]      INTRAUTERINE PREGNANCY  SECTION FULL OP NOTE        DATE OF PROCEDURE:  2020    PREOPERATIVE DIAGNOSIS:  Fetal demise at 28 2/, clinical signs of catastrophic placental abruption    POSTOPERATIVE DIAGNOSIS:  same    PROCEDURE: Low transverse  section    SURGEON:  Mare Liriano MD    ASSISTANT:  Pascual Leung MD    ANESTHESIA: General    EBL: 7766 cc    COMPLICATIONS: none    OPERATIVE PROCEDURE: I arrived in the OR to find Dr. Sharan Sloan had delivered the baby and the placenta, beginnning to close the hysterotomy. I assumed the case at this point. The uterus was exteriorized, wrapped in a wet lap square, curetted with a dry square, and closed in a double-layered fashion with #1 vicryl. Hemostasis appeared adequate. The cul-de-sac was then irrigated and suctioned clean. The uterus was placed in the abdomen. The gutters were irrigated and suctioned clean. The hysterotomy remained hemostatic at this point. The peritoneum and rectus muscles were closed with 2-0 chromic. The fascia was closed with 0 vicryl. Running 2-0 plain was used to reapproximate the subcutaneous tissue. The skin was closed with absorbable staples with cosmesis and hemostasis appreciated. The patient remained stable throughout the entire procedure and went to ICU after recovery.

## 2020-07-29 NOTE — PROGRESS NOTES
Dr. Vera Marion and Ashanti at bedside. Labs ordered and drawn and sent to lab.   2nd 1000ml NS bolus going on pressure bag

## 2020-07-29 NOTE — PROGRESS NOTES
Fundus check complete. Scant vaginal bleeding. Pt very uncomfortable with fundal check. Increased drainage noted on dressing. Dr Pearl Singh notified. Ordered to reinforce dressing with 4x4s.

## 2020-07-29 NOTE — PROGRESS NOTES
Review of earlier coags show different results than when reviewed by myself Dr Do Nogueira and Nursing. Unsure why values now show decreased fibrinogen and increase in PTT compared to value prior reported for that lab draw. Pt otherwise stable will recheck with this blood draw.

## 2020-07-29 NOTE — PROGRESS NOTES
M (Late Entry)    Pt seen and examined at 1230. Hypotensive episode without tachycardia late morning. Pt feeling better at the time examined. Normotensive. Has never had tachycardia during hypotensive episodes. No beta blocking medications. Has not taken tapazole in several weeks. Will hold IV narcotics on chance that this is etiology. Continues to have low UOP. Bolus. Bladder scan. If no improvement, FENA then if needed IV lasix 40mg x 1. Coags and CBC stable/appropriate for significant blood loss. Not in DIC at this time. Pt and mother reiterate agreement to blood products if unable to treat without them. Will use IV iron and TXA prior. Although if worsening maternal status, will transfuse appropriate products. Rapid COVID negative; PCR pending. Pt desires to have umbilical cord/placental segments sent for genetic evaluation. Samples obtained and sent to [facility]. Results to be sent to [facility] and primary OB. Continue ICU care until stable from hemodynamic and fluid balance standpoints. Pt discussed with Dr. Willie Vicente and nursing staff. Repeat labs at 2000.        Vance Zambrano MD

## 2020-07-29 NOTE — PROGRESS NOTES
Follow-up visit with patient and mother. Patient requested to see baby. Baby was brought down and patient held baby and was visibly distraught. Pictures and continued support given. I will continue to follow as needed. Marcia Salas M.Div.

## 2020-07-29 NOTE — PROGRESS NOTES
Called that patient is hypotensive. HR 60's also. Getting fluid bolus CBC was not resulted. Requested check with Dr Zoraida Ashton as she is MFM following in ICU. Then discussed with Dr Zoraida Ashton and Pt seen by myself and Dr Xavi Warner. She has some urine out pt but decreased Pt alert and Dr Xavi Warner discussed events. Abdomen distended but soft. Dressing dry but stained within prior marked area. Will await CBC results. Check coags. Monitor closely. Dr Bladimir Alvarez has consulted Hospitalist to assist with management.

## 2020-07-30 LAB
ABO + RH BLD: NORMAL
ALBUMIN SERPL-MCNC: 1.9 G/DL (ref 3.5–5)
ALBUMIN/GLOB SERPL: 0.7 {RATIO} (ref 1.2–3.5)
ALP SERPL-CCNC: 58 U/L (ref 50–130)
ALT SERPL-CCNC: 98 U/L (ref 12–65)
ANION GAP SERPL CALC-SCNC: 4 MMOL/L (ref 7–16)
APTT PPP: 34.5 SEC (ref 24.3–35.4)
AST SERPL-CCNC: 73 U/L (ref 15–37)
BASOPHILS # BLD: 0 K/UL (ref 0–0.2)
BASOPHILS NFR BLD: 0 % (ref 0–2)
BILIRUB SERPL-MCNC: 0.3 MG/DL (ref 0.2–1.1)
BLOOD BANK CMNT PATIENT-IMP: NORMAL
BUN SERPL-MCNC: 9 MG/DL (ref 6–23)
CALCIUM SERPL-MCNC: 7.6 MG/DL (ref 8.3–10.4)
CHLORIDE SERPL-SCNC: 114 MMOL/L (ref 98–107)
CO2 SERPL-SCNC: 21 MMOL/L (ref 21–32)
CREAT SERPL-MCNC: 0.8 MG/DL (ref 0.6–1)
DAT IGG-SP REAG RBC QL: NORMAL
DIFFERENTIAL METHOD BLD: ABNORMAL
DIFFERENTIAL METHOD BLD: ABNORMAL
EOSINOPHIL # BLD: 0 K/UL (ref 0–0.8)
EOSINOPHIL # BLD: 0.2 K/UL (ref 0–0.8)
EOSINOPHIL NFR BLD: 0 % (ref 0.5–7.8)
EOSINOPHIL NFR BLD: 1 % (ref 1–8)
ERYTHROCYTE [DISTWIDTH] IN BLOOD BY AUTOMATED COUNT: 16.2 %
ERYTHROCYTE [DISTWIDTH] IN BLOOD BY AUTOMATED COUNT: 16.3 % (ref 11.9–14.6)
FIBRINOGEN PPP-MCNC: 331 MG/DL (ref 190–501)
GLOBULIN SER CALC-MCNC: 2.9 G/DL (ref 2.3–3.5)
GLUCOSE SERPL-MCNC: 114 MG/DL (ref 65–100)
HCT VFR BLD AUTO: 25.1 % (ref 35.8–46.3)
HCT VFR BLD AUTO: 26.6 % (ref 35.8–46.3)
HGB BLD-MCNC: 8.4 G/DL (ref 11.7–15.4)
HGB BLD-MCNC: 8.8 G/DL (ref 11.7–15.4)
IMM GRANULOCYTES # BLD AUTO: 0.1 K/UL (ref 0–0.5)
IMM GRANULOCYTES # BLD AUTO: 0.2 K/UL
IMM GRANULOCYTES NFR BLD AUTO: 1 %
IMM GRANULOCYTES NFR BLD AUTO: 1 % (ref 0–5)
INR PPP: 1
LYMPHOCYTES # BLD: 2.3 K/UL (ref 0.5–4.6)
LYMPHOCYTES # BLD: 2.3 K/UL (ref 0.5–4.6)
LYMPHOCYTES NFR BLD: 14 %
LYMPHOCYTES NFR BLD: 14 % (ref 13–44)
MCH RBC QN AUTO: 28.9 PG (ref 26.1–32.9)
MCH RBC QN AUTO: 29.3 PG (ref 26.1–32.9)
MCHC RBC AUTO-ENTMCNC: 33.1 G/DL (ref 31.4–35)
MCHC RBC AUTO-ENTMCNC: 33.5 G/DL (ref 31.4–35)
MCV RBC AUTO: 87.2 FL (ref 79.6–97.8)
MCV RBC AUTO: 87.5 FL (ref 79.6–97.8)
MONOCYTES # BLD: 0.8 K/UL (ref 0.1–1.3)
MONOCYTES # BLD: 1 K/UL (ref 0.1–1.3)
MONOCYTES NFR BLD: 5 %
MONOCYTES NFR BLD: 6 % (ref 4–12)
NEUTS SEG # BLD: 13 K/UL (ref 1.7–8.2)
NEUTS SEG # BLD: 13.1 K/UL (ref 1.7–8.2)
NEUTS SEG NFR BLD MANUAL: 79 % (ref 47–75)
NEUTS SEG NFR BLD: 79 % (ref 43–78)
NRBC # BLD: 0 K/UL (ref 0–0.2)
PLATELET # BLD AUTO: 123 K/UL
PLATELET # BLD AUTO: 137 K/UL (ref 150–450)
PMV BLD AUTO: 10.1 FL (ref 9.4–12.3)
PMV BLD AUTO: 9 FL (ref 9.4–12.3)
POTASSIUM SERPL-SCNC: 4.2 MMOL/L (ref 3.5–5.1)
PROT SERPL-MCNC: 4.8 G/DL (ref 6.3–8.2)
PROTHROMBIN TIME: 13.6 SEC (ref 12–14.7)
RBC # BLD AUTO: 2.87 M/UL
RBC # BLD AUTO: 3.05 M/UL (ref 4.05–5.2)
SODIUM SERPL-SCNC: 139 MMOL/L (ref 136–145)
T3FREE SERPL-MCNC: 2.3 PG/ML (ref 2–4.4)
WBC # BLD AUTO: 16.5 K/UL (ref 4.3–11.1)
WBC # BLD AUTO: 16.6 K/UL (ref 4.3–11.1)

## 2020-07-30 PROCEDURE — 85730 THROMBOPLASTIN TIME PARTIAL: CPT

## 2020-07-30 PROCEDURE — 74011250636 HC RX REV CODE- 250/636: Performed by: OBSTETRICS & GYNECOLOGY

## 2020-07-30 PROCEDURE — 80053 COMPREHEN METABOLIC PANEL: CPT

## 2020-07-30 PROCEDURE — 85610 PROTHROMBIN TIME: CPT

## 2020-07-30 PROCEDURE — 74011250637 HC RX REV CODE- 250/637: Performed by: OBSTETRICS & GYNECOLOGY

## 2020-07-30 PROCEDURE — 74011000258 HC RX REV CODE- 258: Performed by: OBSTETRICS & GYNECOLOGY

## 2020-07-30 PROCEDURE — 65270000029 HC RM PRIVATE

## 2020-07-30 PROCEDURE — 30233N1 TRANSFUSION OF NONAUTOLOGOUS RED BLOOD CELLS INTO PERIPHERAL VEIN, PERCUTANEOUS APPROACH: ICD-10-PCS | Performed by: OBSTETRICS & GYNECOLOGY

## 2020-07-30 PROCEDURE — 36430 TRANSFUSION BLD/BLD COMPNT: CPT

## 2020-07-30 PROCEDURE — P9016 RBC LEUKOCYTES REDUCED: HCPCS

## 2020-07-30 PROCEDURE — 85025 COMPLETE CBC W/AUTO DIFF WBC: CPT

## 2020-07-30 PROCEDURE — 85384 FIBRINOGEN ACTIVITY: CPT

## 2020-07-30 RX ORDER — SODIUM CHLORIDE 9 MG/ML
250 INJECTION, SOLUTION INTRAVENOUS AS NEEDED
Status: DISCONTINUED | OUTPATIENT
Start: 2020-07-30 | End: 2020-07-30

## 2020-07-30 RX ORDER — IBUPROFEN 600 MG/1
600 TABLET ORAL
Status: DISCONTINUED | OUTPATIENT
Start: 2020-07-30 | End: 2020-08-02 | Stop reason: HOSPADM

## 2020-07-30 RX ORDER — SODIUM CHLORIDE, SODIUM LACTATE, POTASSIUM CHLORIDE, CALCIUM CHLORIDE 600; 310; 30; 20 MG/100ML; MG/100ML; MG/100ML; MG/100ML
20 INJECTION, SOLUTION INTRAVENOUS CONTINUOUS
Status: DISCONTINUED | OUTPATIENT
Start: 2020-07-30 | End: 2020-07-31

## 2020-07-30 RX ORDER — SODIUM CHLORIDE, SODIUM LACTATE, POTASSIUM CHLORIDE, CALCIUM CHLORIDE 600; 310; 30; 20 MG/100ML; MG/100ML; MG/100ML; MG/100ML
150 INJECTION, SOLUTION INTRAVENOUS CONTINUOUS
Status: DISCONTINUED | OUTPATIENT
Start: 2020-07-30 | End: 2020-07-30

## 2020-07-30 RX ADMIN — OXYCODONE HYDROCHLORIDE AND ACETAMINOPHEN 2 TABLET: 7.5; 325 TABLET ORAL at 03:25

## 2020-07-30 RX ADMIN — OXYCODONE HYDROCHLORIDE AND ACETAMINOPHEN 2 TABLET: 7.5; 325 TABLET ORAL at 21:08

## 2020-07-30 RX ADMIN — Medication 10 ML: at 05:34

## 2020-07-30 RX ADMIN — OXYCODONE HYDROCHLORIDE AND ACETAMINOPHEN 2 TABLET: 7.5; 325 TABLET ORAL at 16:19

## 2020-07-30 RX ADMIN — OXYCODONE HYDROCHLORIDE AND ACETAMINOPHEN 1 TABLET: 7.5; 325 TABLET ORAL at 08:26

## 2020-07-30 RX ADMIN — DOCUSATE SODIUM 100 MG: 100 CAPSULE, LIQUID FILLED ORAL at 08:26

## 2020-07-30 RX ADMIN — SIMETHICONE CHEW TAB 80 MG 80 MG: 80 TABLET ORAL at 08:26

## 2020-07-30 RX ADMIN — SODIUM CHLORIDE 125 MG: 900 INJECTION, SOLUTION INTRAVENOUS at 08:41

## 2020-07-30 RX ADMIN — SIMETHICONE CHEW TAB 80 MG 80 MG: 80 TABLET ORAL at 16:19

## 2020-07-30 RX ADMIN — OXYCODONE HYDROCHLORIDE AND ACETAMINOPHEN 2 TABLET: 7.5; 325 TABLET ORAL at 12:11

## 2020-07-30 RX ADMIN — SIMETHICONE CHEW TAB 80 MG 80 MG: 80 TABLET ORAL at 12:11

## 2020-07-30 RX ADMIN — FAMOTIDINE 20 MG: 20 TABLET, FILM COATED ORAL at 08:26

## 2020-07-30 RX ADMIN — DOCUSATE SODIUM 100 MG: 100 CAPSULE, LIQUID FILLED ORAL at 18:23

## 2020-07-30 RX ADMIN — SODIUM CHLORIDE, SODIUM LACTATE, POTASSIUM CHLORIDE, AND CALCIUM CHLORIDE 150 ML/HR: 600; 310; 30; 20 INJECTION, SOLUTION INTRAVENOUS at 08:48

## 2020-07-30 RX ADMIN — SIMETHICONE CHEW TAB 80 MG 80 MG: 80 TABLET ORAL at 22:37

## 2020-07-30 RX ADMIN — IBUPROFEN 600 MG: 600 TABLET, FILM COATED ORAL at 18:23

## 2020-07-30 NOTE — PROGRESS NOTES
Report given to Kellen Shoemaker, [address] [facility] [place]

## 2020-07-30 NOTE — PROGRESS NOTES
Patient seen and examined. States she feels OK. UOP adequate and light yellow in color. Dressing dry. Earlier Hgb 6.2. Patient agreed to transfusion. Currently receiving first unit PRBC's. Coags were OK. Prior discussions had been had with patient about transfusion and she agreed to take if needed. Discussed again the need for transfusion and her questions were answered. No sign of active bleeding will follow labs after transfusion.

## 2020-07-30 NOTE — PROGRESS NOTES
Dr Lexis Goodman notified of pt vitals and WBC No new orders revived. Will continue to monitor the pt and update as needed.

## 2020-07-30 NOTE — PROGRESS NOTES
Velasquez catheter removed. Pt educated on importance of getting out of bed. Pt informed to let RN know when she's ready to get up to use the bathroom. Pt assisted with bed bath. Gown changed, pericare performed.

## 2020-07-30 NOTE — PROGRESS NOTES
Pt resting quietly in bed. No needs voiced at this time. Pt mother at bedside. Instructed to call before getting out of bed.

## 2020-07-30 NOTE — PROGRESS NOTES
Post-Operative Day Number 1 Progress Note    Patient post-op day 1 from  delivery without significant complaints. Pain controlled on current medication. Tired but doing OK. Labs being drawn now        Vitals:    Patient Vitals for the past 8 hrs:   BP Temp Pulse Resp SpO2 Weight   20 0818     100 %    20 0631 116/78  86 18 100 %    20 0601 123/70  85 18 100 %    20 0531 125/71 98.9 °F (37.2 °C) 81 18 100 % 240 lb 4.8 oz (109 kg)   20 0501 119/67  88 15 100 %    20 0431 120/76  90 20 100 %    20 0401 140/84  95 16 100 %    20 0331 148/81  80 22 100 %    20 0301 141/79  80 22 100 %    20 0233 166/82 99.4 °F (37.4 °C) 78 18 100 %    20 0201 128/80 99.9 °F (37.7 °C) 80 16 100 %    20 0131 146/83 100 °F (37.8 °C) 82 23 100 %    20 0100 122/84 98.7 °F (37.1 °C) 77 18 100 %      Temp (24hrs), Av.5 °F (37.5 °C), Min:97.1 °F (36.2 °C), Max:101 °F (38.3 °C)      Vital signs stable, afebrile. Exam:  Patient without distress. Abdomen soft, fundus firm at level of umbilicus. Incision dry and clean without erythema small amount oozing just L of midline               Lower extremities are negative, cords or tenderness. Lab/Data Review:  CBC:   Lab Results   Component Value Date/Time    WBC 16.5 (H) 2020 03:24 AM    HGB 8.8 (L) 2020 03:24 AM    HCT 26.6 (L) 2020 03:24 AM     (L) 2020 03:24 AM     CBC CMP Coags being drawn now. Assessment and Plan:  Patient appears to be improving S/P 2 units PRBC's. No further hypotensive episodes. Will await these labs and if stable probable transfer to floor.

## 2020-07-30 NOTE — PROGRESS NOTES
Delayed entry: Dr Savanna Hebert called and gave verbal order to transfuse the pt with 2 units and redraw CBC when complete.

## 2020-07-30 NOTE — PROGRESS NOTES
I spent a great deal of time with patient. I went over  bereavement purple folder. Talked about her grief. I also discussed with her about fetal remains disposition. Cremation/burial options discussed at length. Information given and she will think about what she would like. I will continue to support as needed. Dipesh Dominguez M.Div.

## 2020-07-30 NOTE — ROUTINE PROCESS
TRANSFER - OUT REPORT: 
 
Verbal report given to Claudeen Brochure on Margie Alberts  being transferred to L&D [facility] for routine progression of care Report consisted of patients Situation, Background, Assessment and  
Recommendations(SBAR). Information from the following report(s) Procedure Summary, Intake/Output, MAR, Accordion and Recent Results was reviewed with the receiving nurse. Lines:  
Peripheral IV Right (Active) Site Assessment Clean, dry, & intact 07/29/20 1545 Phlebitis Assessment 0 07/29/20 1545 Infiltration Assessment 0 07/29/20 1545 Dressing Status Clean, dry, & intact 07/29/20 1545 Dressing Type Tape;Transparent 07/29/20 1545 Hub Color/Line Status Green; Infusing 07/29/20 1545 Peripheral IV 07/29/20 Left;Posterior Hand (Active) Site Assessment Clean, dry, & intact 07/30/20 0531 Phlebitis Assessment 0 07/30/20 0531 Infiltration Assessment 0 07/30/20 0531 Dressing Status Clean, dry, & intact 07/30/20 0531 Dressing Type Transparent;Tape 07/30/20 0531 Hub Color/Line Status Blue;Flushed 07/30/20 0531 Peripheral IV 07/30/20 Anterior;Proximal;Right Forearm (Active) Site Assessment Clean, dry, & intact 07/30/20 0531 Phlebitis Assessment 0 07/30/20 0531 Infiltration Assessment 0 07/30/20 0531 Dressing Status Clean, dry, & intact 07/30/20 0531 Dressing Type Tape;Transparent 07/30/20 0531 Hub Color/Line Status Pink;Flushed 07/30/20 0531 Opportunity for questions and clarification was provided.    
 
Patient transported with:

## 2020-07-30 NOTE — PROGRESS NOTES
SW attempted to meet with patient in ICU who is currently being followed by  after IUFD of 33w3d male. Patient sleeping; SW did not disturb. Additionally, patient is getting ready to move up to the 4th floor within the hour. SW will continue to follow and meet with patient.     BI Benitez  [address] [facility]   [phone]

## 2020-07-31 LAB
ABO + RH BLD: NORMAL
ALBUMIN SERPL-MCNC: 1.9 G/DL (ref 3.5–5)
ALBUMIN/GLOB SERPL: 0.6 {RATIO} (ref 1.2–3.5)
ALP SERPL-CCNC: 59 U/L (ref 50–136)
ALT SERPL-CCNC: 80 U/L (ref 12–65)
ANION GAP SERPL CALC-SCNC: 6 MMOL/L (ref 7–16)
APTT PPP: 32.2 SEC (ref 24.3–35.4)
AST SERPL-CCNC: 47 U/L (ref 15–37)
BASOPHILS # BLD: 0 K/UL (ref 0–0.2)
BASOPHILS NFR BLD: 0 % (ref 0–2)
BILIRUB SERPL-MCNC: 0.2 MG/DL (ref 0.2–1.1)
BLD PROD TYP BPU: NORMAL
BLOOD GROUP ANTIBODIES SERPL: NORMAL
BPU ID: NORMAL
BUN SERPL-MCNC: 7 MG/DL (ref 6–23)
CALCIUM SERPL-MCNC: 7.8 MG/DL (ref 8.3–10.4)
CHLORIDE SERPL-SCNC: 112 MMOL/L (ref 98–107)
CO2 SERPL-SCNC: 23 MMOL/L (ref 21–32)
COVID-19 RAPID TEST, COVR: NOT DETECTED
CREAT SERPL-MCNC: 0.77 MG/DL (ref 0.6–1)
CROSSMATCH RESULT,%XM: NORMAL
DIFFERENTIAL METHOD BLD: ABNORMAL
EOSINOPHIL # BLD: 0.5 K/UL (ref 0–0.8)
EOSINOPHIL NFR BLD: 3 % (ref 0.5–7.8)
ERYTHROCYTE [DISTWIDTH] IN BLOOD BY AUTOMATED COUNT: 17.1 % (ref 11.9–14.6)
FIBRINOGEN PPP-MCNC: 496 MG/DL (ref 190–501)
GLOBULIN SER CALC-MCNC: 3.3 G/DL (ref 2.3–3.5)
GLUCOSE SERPL-MCNC: 84 MG/DL (ref 65–100)
HCT VFR BLD AUTO: 23.3 % (ref 35.8–46.3)
HGB BLD-MCNC: 7.6 G/DL (ref 11.7–15.4)
IMM GRANULOCYTES # BLD AUTO: 0.4 K/UL (ref 0–0.5)
IMM GRANULOCYTES NFR BLD AUTO: 2 % (ref 0–5)
INR PPP: 1
LYMPHOCYTES # BLD: 2.6 K/UL (ref 0.5–4.6)
LYMPHOCYTES NFR BLD: 15 % (ref 13–44)
MAGNESIUM SERPL-MCNC: 2.1 MG/DL (ref 1.8–2.4)
MCH RBC QN AUTO: 29.1 PG (ref 26.1–32.9)
MCHC RBC AUTO-ENTMCNC: 32.6 G/DL (ref 31.4–35)
MCV RBC AUTO: 89.3 FL (ref 79.6–97.8)
MONOCYTES # BLD: 0.9 K/UL (ref 0.1–1.3)
MONOCYTES NFR BLD: 5 % (ref 4–12)
NEUTS SEG # BLD: 13.2 K/UL (ref 1.7–8.2)
NEUTS SEG NFR BLD: 75 % (ref 43–78)
NRBC # BLD: 0.03 K/UL (ref 0–0.2)
PLATELET # BLD AUTO: 135 K/UL (ref 150–450)
PMV BLD AUTO: 9.3 FL (ref 9.4–12.3)
POTASSIUM SERPL-SCNC: 3.8 MMOL/L (ref 3.5–5.1)
PROT SERPL-MCNC: 5.2 G/DL (ref 6.3–8.2)
PROTHROMBIN TIME: 13.4 SEC (ref 12–14.7)
RBC # BLD AUTO: 2.61 M/UL (ref 4.05–5.2)
SARS COV-2, XPGCVT: NEGATIVE
SODIUM SERPL-SCNC: 141 MMOL/L (ref 136–145)
SOURCE, COVRS: NORMAL
SPECIMEN EXP DATE BLD: NORMAL
STATUS OF UNIT,%ST: NORMAL
UNIT DIVISION, %UDIV: 0
WBC # BLD AUTO: 17.7 K/UL (ref 4.3–11.1)

## 2020-07-31 PROCEDURE — 85027 COMPLETE CBC AUTOMATED: CPT

## 2020-07-31 PROCEDURE — 83735 ASSAY OF MAGNESIUM: CPT

## 2020-07-31 PROCEDURE — 80053 COMPREHEN METABOLIC PANEL: CPT

## 2020-07-31 PROCEDURE — 85384 FIBRINOGEN ACTIVITY: CPT

## 2020-07-31 PROCEDURE — 85610 PROTHROMBIN TIME: CPT

## 2020-07-31 PROCEDURE — 74011250636 HC RX REV CODE- 250/636: Performed by: OBSTETRICS & GYNECOLOGY

## 2020-07-31 PROCEDURE — 85730 THROMBOPLASTIN TIME PARTIAL: CPT

## 2020-07-31 PROCEDURE — 36415 COLL VENOUS BLD VENIPUNCTURE: CPT

## 2020-07-31 PROCEDURE — 74011250637 HC RX REV CODE- 250/637: Performed by: OBSTETRICS & GYNECOLOGY

## 2020-07-31 PROCEDURE — 65270000029 HC RM PRIVATE

## 2020-07-31 PROCEDURE — 74011000258 HC RX REV CODE- 258: Performed by: OBSTETRICS & GYNECOLOGY

## 2020-07-31 RX ORDER — METHIMAZOLE 5 MG/1
5 TABLET ORAL EVERY 8 HOURS
Status: DISCONTINUED | OUTPATIENT
Start: 2020-07-31 | End: 2020-08-02 | Stop reason: HOSPADM

## 2020-07-31 RX ADMIN — SIMETHICONE CHEW TAB 80 MG 80 MG: 80 TABLET ORAL at 18:19

## 2020-07-31 RX ADMIN — OXYCODONE HYDROCHLORIDE AND ACETAMINOPHEN 1 TABLET: 7.5; 325 TABLET ORAL at 08:01

## 2020-07-31 RX ADMIN — SIMETHICONE CHEW TAB 80 MG 80 MG: 80 TABLET ORAL at 08:01

## 2020-07-31 RX ADMIN — IBUPROFEN 600 MG: 600 TABLET, FILM COATED ORAL at 11:07

## 2020-07-31 RX ADMIN — ZOLPIDEM TARTRATE 5 MG: 5 TABLET ORAL at 00:21

## 2020-07-31 RX ADMIN — DOCUSATE SODIUM 100 MG: 100 CAPSULE, LIQUID FILLED ORAL at 18:19

## 2020-07-31 RX ADMIN — FAMOTIDINE 20 MG: 20 TABLET, FILM COATED ORAL at 08:01

## 2020-07-31 RX ADMIN — OXYCODONE HYDROCHLORIDE AND ACETAMINOPHEN 2 TABLET: 7.5; 325 TABLET ORAL at 21:08

## 2020-07-31 RX ADMIN — OXYCODONE HYDROCHLORIDE AND ACETAMINOPHEN 1 TABLET: 7.5; 325 TABLET ORAL at 04:02

## 2020-07-31 RX ADMIN — SODIUM CHLORIDE 125 MG: 900 INJECTION, SOLUTION INTRAVENOUS at 08:46

## 2020-07-31 RX ADMIN — SIMETHICONE CHEW TAB 80 MG 80 MG: 80 TABLET ORAL at 11:07

## 2020-07-31 RX ADMIN — METHIMAZOLE 5 MG: 5 TABLET ORAL at 21:08

## 2020-07-31 RX ADMIN — IBUPROFEN 600 MG: 600 TABLET, FILM COATED ORAL at 18:19

## 2020-07-31 RX ADMIN — OXYCODONE HYDROCHLORIDE AND ACETAMINOPHEN 2 TABLET: 7.5; 325 TABLET ORAL at 14:20

## 2020-07-31 RX ADMIN — DOCUSATE SODIUM 100 MG: 100 CAPSULE, LIQUID FILLED ORAL at 08:01

## 2020-07-31 RX ADMIN — IBUPROFEN 600 MG: 600 TABLET, FILM COATED ORAL at 04:02

## 2020-07-31 NOTE — PROGRESS NOTES
SW met with patient and patient's mother Kate Novak). Condolences offered regarding loss of her son (Latanya Holman). Both Tala and patient requested a letter to provide to their employers in order to receive bereavement leave. Patient gave  permission to write a letter on her mother's behalf containing information on the loss of her son. Dewayne Morrow () provided FOB Jarad Auguste) work/bereavement letter yesterday. Letters written and provided to [facility] & patient. Patient verbalized concern about her income as \"it's about to be cut down to 60%. \"   contacted [employer] on behalf of patient to receive support with Medicaid application Fernanda Morocho @ [phone]). Informational sheet also provided on applying SNAP/Food [place]. Verbal education/pamphlet provided on mental health support available at [address] [place] [address] (accept patient's insurance). Family denied any additional needs at this time. Additionally, patient has this 's contact information for additional needs/questions. [place] will continue to follow.     KIRBY Agarwal-MERCEDEZ  [facility]   [phone]

## 2020-07-31 NOTE — PROGRESS NOTES
Pt to high fowlers, encouraged deep breathing exercises. Incentive spirometer to bedside. Lung sounds clear. Shift assessment done. Pt reports feeling \"tight\" and having difficulty taking a deep breath. 02 sat 97% on RA. Attempted to sit on side of the bed. Too painful in legs and incisional area at this time. See MAR. Will try again and continue to closely monitor.

## 2020-07-31 NOTE — PROGRESS NOTES
14:20  Medication Given  oxyCODONE-acetaminophen (PERCOCET 7.5) 7.5-325 mg per tablet 2 Tab -  Dose: 2 Tab ; Route: Oral

## 2020-07-31 NOTE — PROGRESS NOTES
2020      RE: Lexus Morales       To Whom it May Concern:      Please accept this letter as confirmation that Lexus Morales has been at the hospital with her daughter Matilda Javed since Kody Meredith was admitted on 20. Very sadly, Mckenzie experienced the loss of her baby boy on 20 via . Please extend Tala and her family the much needed support and assistance of bereavement leave during this time. Feel free to contact my office if you have any questions or concerns. Thank you for your assistance in this matter.     Sincerely,        BI Patrick MD  Medical Social Worker     OB/GYN  [phone]

## 2020-07-31 NOTE — PROGRESS NOTES
Post-Partum Day Number 2 Progress Note    Patient doing well  without significant complaints. Pain controlled on current medication. Voiding without difficulty, normal lochia. Vitals:    Visit Vitals  /81   Pulse 94   Temp 98.1 °F (36.7 °C)   Resp 18   Wt 240 lb 4.8 oz (109 kg)   LMP 12/16/2019   SpO2 97%   BMI 39.99 kg/m²       Vital signs stable, afebrile. Exam:  Patient without distress. Heart rrr  Lungs cta b&s               Abdomen soft, fundus firm at level of umbilicus, nontender. Incision clean, dry and intact. Lower extremities are negative for swelling, cords or tenderness. Lab Results   Component Value Date/Time    ABO Group O 02/18/2020 01:42 PM    Rh (D) Positive 02/18/2020 01:42 PM    ABO/Rh(D) Sofia Damon POSITIVE 07/29/2020 09:55 AM        Lab Results   Component Value Date/Time    ABO/Rh(D) Sofia Damon POSITIVE 07/29/2020 09:55 AM    Antibody screen NEG 07/29/2020 09:55 AM    Antibody screen, External Negative 02/18/2020    Rubella, External Immune 02/18/2020    ABO,Rh O positive 02/18/2020     Lab Results   Component Value Date/Time    HGB 7.6 (L) 07/31/2020 05:56 AM    Hgb, External 11.8 02/18/2020         Assessment and Plan:  Pod#2 - s/p abruption and demise. Overall pt doing well. Acute blood loss anemia s/p 2 units prbcs. Will follow. Anticipate discharge home tomorrow. Iv iron today.

## 2020-08-01 VITALS
BODY MASS INDEX: 39.99 KG/M2 | TEMPERATURE: 98.6 F | DIASTOLIC BLOOD PRESSURE: 75 MMHG | HEART RATE: 82 BPM | RESPIRATION RATE: 18 BRPM | OXYGEN SATURATION: 98 % | SYSTOLIC BLOOD PRESSURE: 160 MMHG | WEIGHT: 240.3 LBS

## 2020-08-01 PROCEDURE — 74011250637 HC RX REV CODE- 250/637: Performed by: OBSTETRICS & GYNECOLOGY

## 2020-08-01 RX ORDER — SERTRALINE HYDROCHLORIDE 100 MG/1
50 TABLET, FILM COATED ORAL DAILY
Status: DISCONTINUED | OUTPATIENT
Start: 2020-08-01 | End: 2020-08-01

## 2020-08-01 RX ORDER — VENLAFAXINE HYDROCHLORIDE 37.5 MG/1
37.5 CAPSULE, EXTENDED RELEASE ORAL
Qty: 30 CAP | Refills: 1 | Status: SHIPPED | OUTPATIENT
Start: 2020-08-02 | End: 2020-08-20

## 2020-08-01 RX ORDER — IBUPROFEN 800 MG/1
600 TABLET ORAL
Qty: 30 TAB | Refills: 0 | Status: SHIPPED | OUTPATIENT
Start: 2020-08-01 | End: 2021-12-07 | Stop reason: ALTCHOICE

## 2020-08-01 RX ORDER — VENLAFAXINE HYDROCHLORIDE 37.5 MG/1
37.5 CAPSULE, EXTENDED RELEASE ORAL
Status: DISCONTINUED | OUTPATIENT
Start: 2020-08-01 | End: 2020-08-02 | Stop reason: HOSPADM

## 2020-08-01 RX ORDER — OXYCODONE AND ACETAMINOPHEN 7.5; 325 MG/1; MG/1
1 TABLET ORAL
Qty: 25 TAB | Refills: 0 | Status: SHIPPED | OUTPATIENT
Start: 2020-08-01 | End: 2020-08-04

## 2020-08-01 RX ORDER — POLYETHYLENE GLYCOL 3350 17 G/17G
17 POWDER, FOR SOLUTION ORAL DAILY
Qty: 30 PACKET | Refills: 0 | Status: SHIPPED
Start: 2020-08-01 | End: 2021-12-07 | Stop reason: ALTCHOICE

## 2020-08-01 RX ORDER — ZOLPIDEM TARTRATE 5 MG/1
5 TABLET ORAL
Qty: 30 TAB | Refills: 0 | Status: SHIPPED | OUTPATIENT
Start: 2020-08-01 | End: 2020-09-21 | Stop reason: SDUPTHER

## 2020-08-01 RX ADMIN — SIMETHICONE CHEW TAB 80 MG 80 MG: 80 TABLET ORAL at 09:01

## 2020-08-01 RX ADMIN — ZOLPIDEM TARTRATE 5 MG: 5 TABLET ORAL at 01:22

## 2020-08-01 RX ADMIN — FAMOTIDINE 20 MG: 20 TABLET, FILM COATED ORAL at 09:01

## 2020-08-01 RX ADMIN — METHIMAZOLE 5 MG: 5 TABLET ORAL at 06:31

## 2020-08-01 RX ADMIN — DOCUSATE SODIUM 100 MG: 100 CAPSULE, LIQUID FILLED ORAL at 09:01

## 2020-08-01 RX ADMIN — SIMETHICONE CHEW TAB 80 MG 80 MG: 80 TABLET ORAL at 12:09

## 2020-08-01 RX ADMIN — IBUPROFEN 600 MG: 600 TABLET, FILM COATED ORAL at 04:45

## 2020-08-01 RX ADMIN — OXYCODONE HYDROCHLORIDE AND ACETAMINOPHEN 2 TABLET: 7.5; 325 TABLET ORAL at 04:45

## 2020-08-01 RX ADMIN — OXYCODONE HYDROCHLORIDE AND ACETAMINOPHEN 1 TABLET: 7.5; 325 TABLET ORAL at 12:08

## 2020-08-01 RX ADMIN — VENLAFAXINE HYDROCHLORIDE 37.5 MG: 37.5 CAPSULE, EXTENDED RELEASE ORAL at 12:09

## 2020-08-01 RX ADMIN — IBUPROFEN 600 MG: 600 TABLET, FILM COATED ORAL at 12:09

## 2020-08-01 NOTE — PROGRESS NOTES
Patient given percocet and motrin for pain 8/10 abdominal pain and breast pain. Breast wrapped tightly with ace wrap ice packs applied.

## 2020-08-01 NOTE — PROGRESS NOTES
Post-Partum Day Number 3 Progress Note    Pt has walked. Has a little bit of burning by your incision. Feels well. Her mother is present too. Vitals:    Visit Vitals  /75 (BP 1 Location: Right arm, BP Patient Position: At rest)   Pulse 82   Temp 98.6 °F (37 °C)   Resp 18   Wt 240 lb 4.8 oz (109 kg)   LMP 12/16/2019   SpO2 98%   BMI 39.99 kg/m²       Vital signs stable, afebrile. Exam:  Patient without distress. Pt appears in better spirits than this am.      Lab Results   Component Value Date/Time    ABO Group O 02/18/2020 01:42 PM    Rh (D) Positive 02/18/2020 01:42 PM    ABO/Rh(D) Fidencio Cat POSITIVE 07/29/2020 09:55 AM        Lab Results   Component Value Date/Time    ABO/Rh(D) Fidencio Cat POSITIVE 07/29/2020 09:55 AM    Antibody screen NEG 07/29/2020 09:55 AM    Antibody screen, External Negative 02/18/2020    Rubella, External Immune 02/18/2020    ABO,Rh O positive 02/18/2020     Lab Results   Component Value Date/Time    HGB 7.6 (L) 07/31/2020 05:56 AM    Hgb, External 11.8 02/18/2020         Assessment and Plan: s/p abruption and fetal demise. Started effexor today. Extensively counseled pt and her mother about what to look for to return. Aware of share program. Acute on chronic anemia - s/p iv iron. Continue oral as well. Will discharge home.

## 2020-08-01 NOTE — DISCHARGE INSTRUCTIONS
Patient Education        Stillbirth and Infant Loss ( Delivery): Care Instructions  Overview     The loss of a baby is devastating and very hard to accept. You may wonder why it happened or blame yourself. But a loss can happen even in a pregnancy that had been going well. In the weeks to come, try to take care of your physical and emotional needs. Take care of yourself in whatever way feels best.  After a  section, or , you may have some pain in your lower belly. You may need pain medicine for 1 to 2 weeks. You can expect some vaginal bleeding for several weeks. You will probably need about 6 weeks for the pain to go away completely. It's important to take it easy while the cut (incision) heals. While you recover, avoid heavy lifting, strenuous activities, and exercises that strain the belly muscles. Ask a family member or friend for help with housework, cooking, and shopping. Follow-up care is a key part of your treatment and safety. Be sure to make and go to all appointments, and call your doctor if you are having problems. It's also a good idea to know your test results and keep a list of the medicines you take. How can you care for yourself at home? Taking care of your body  · Rest when you feel tired. Getting enough sleep will help you recover. · Try to walk each day. Start by walking a little more than you did the day before. Bit by bit, increase the amount you walk. Walking boosts blood flow and helps prevent pneumonia, constipation, and blood clots. · Avoid strenuous activities, such as bicycle riding, jogging, weightlifting, and aerobic exercise, for 6 weeks or until your doctor says it is okay. · Until your doctor says it is okay, avoid heavy lifting. · Do not do sit-ups or other exercises that strain the belly muscles for 6 weeks or until your doctor says it is okay. · Hold a pillow over your incision when you cough or take deep breaths.  This will support your belly and decrease your pain. · If you have strips of tape on the incision, leave the tape on for a week or until it falls off. · Keep the incision clean and dry. · You may shower as usual. Pat the incision dry when you are done. · You will have some vaginal bleeding. Wear sanitary pads. Do not douche or use tampons until your doctor says it is okay. · Talk to your doctor about how to ease discomfort from your milk coming in. It can take days to a few weeks for your milk to dry up. · Ask your doctor when you can drive again. · You will probably need to take at least 6 weeks off work. It depends on the type of work you do and how you feel. · Ask your doctor when it is okay for you to have sex. · Some women want to try to get pregnant again. Your doctor can tell you when it is safe. Diet  · You can eat your normal diet. If your stomach is upset, try bland, low-fat foods like plain rice, broiled chicken, toast, and yogurt. · Drink plenty of fluids (unless your doctor tells you not to). · You may notice that your bowel movements are not regular right after your surgery. This is common. Try to avoid constipation and straining with bowel movements. You may want to take a fiber supplement every day. If you have not had a bowel movement after a couple of days, ask your doctor about taking a mild laxative. Medicines  · Your doctor will tell you if and when you can restart your medicines. He or she will also give you instructions about taking any new medicines. · If you take aspirin or some other blood thinner, be sure to talk to your doctor. He or she will tell you if and when to start taking this medicine again. Make sure that you understand exactly what your doctor wants you to do. · Take pain medicines exactly as directed. ? If the doctor gave you a prescription medicine for pain, take it as prescribed.   ? If you are not taking a prescription pain medicine, ask your doctor if you can take an over-the-counter medicine. · If you think your pain medicine is making you sick to your stomach:  ? Take your medicine after meals (unless your doctor has told you not to). ? Ask your doctor for a different pain medicine. · If your doctor prescribed antibiotics, take them as directed. Do not stop taking them just because you feel better. You need to take the full course of antibiotics. Dealing with your grief  · Rest whenever you can. Being tired makes it harder to handle your emotions. · Tell your family and friends what they can do. You may want to spend time alone, or you may seek the comfort of family and friends. · Try to eat healthy foods, get some sleep, and get exercise (or just get out of the house) to help you feel strong as you heal.  · Talk to your doctor about how you are coping. He or she will want to watch you for signs of depression. You may want to have counseling for support and to help you express your feelings. · Think about making a memory book of your pregnancy and baby. Many parents want to take pictures and keep a lock of hair. The hospital may take photos or footprints for you. · If you can, try to talk to others who have gone through this loss. You can make connections online or in person. Here are some organizations that can help:  ? The Compassionate Friends: Go to www.compassionatefriends. org for this resource for people who have lost a child. The group can help put you in touch with one of its support groups in your area. ? Share (Pregnancy and Infant Loss [address] [place] [address].): This group at www.nationalare. org can offer advice and connections to others who have lost a child. ? The International Stillbirth [place]: This group at www.stillbirthalliance. org offers information and resources. When should you call for help? Call 911 anytime you think you may need emergency care. For example, call if:  · You have thoughts of harming yourself or another person.   · You passed out (lost consciousness). · You have chest pain, are short of breath, or cough up blood. · You have a seizure. Call your doctor now or seek immediate medical care if:  · You have pain that does not get better after you take pain medicine. · You have severe vaginal bleeding. · You are dizzy or lightheaded, or you feel like you may faint. · You have new or worse pain in your belly or pelvis. · You have loose stitches, or your incision comes open. · You have symptoms of infection, such as:  ? Increased pain, swelling, warmth, or redness. ? Red streaks leading from the incision. ? Pus draining from the incision. ? A fever. · You have symptoms of a blood clot in your leg (called a deep vein thrombosis), such as:  ? Pain in your calf, back of the knee, thigh, or groin. ? Redness and swelling in your leg or groin. · You have signs of preeclampsia, such as:  ? Sudden swelling of your face, hands, or feet. ? New vision problems (such as dimness, blurring, or seeing spots). ? A severe headache. Watch closely for changes in your health, and be sure to contact your doctor if:  · You feel so sad, anxious, or hopeless that you aren't able to manage daily activities. Where can you learn more? Go to http://www.gray.com/  Enter S270 in the search box to learn more about \"Stillbirth and Infant Loss ( Delivery): Care Instructions. \"  Current as of: 2020               Content Version: 12.5   [employer]. Care instructions adapted under license by [employer] (which disclaims liability or warranty for this information). If you have questions about a medical condition or this instruction, always ask your healthcare professional. [address] any warranty or liability for your use of this information.

## 2020-08-01 NOTE — PROGRESS NOTES
Morning meds given per MAR. Pt in bed; pt states she is not ready to get up and walk; RN encouraged pt that walking would help with swelling and tightness. Assessment complete. Will repeat blood pressure.

## 2020-08-01 NOTE — DISCHARGE SUMMARY
Obstetrical Discharge Summary     Name: Janae Apgar MRN: [id number]  SSN: [id number]    YOB: 1975  Age: 40 y.o. Sex: female      Allergies: Patient has no known allergies. Admit Date: 2020    Discharge Date: 2020     Admitting Physician: Kelton Soler MD     Attending Physician:  Gregg Clark MD     * Admission Diagnoses: Antepartum placental abruption [O45.90]; Antepartum placental abruption [O45.90]  Fetal demise   * Discharge Diagnoses:   Information for the patient's :  Nikki Heritage Pending FD [[id number]]   Delivery of a 3 lb 2 oz (1.417 kg) child infant via , Low Transverse on 2020 at 6:51 AM  by Kelton Soler. Apgars were 0  and 0 .   nonviable   Additional Diagnoses:   Hospital Problems as of 2020 Date Reviewed: 2020          Codes Class Noted - Resolved POA    * (Principal) Antepartum placental abruption ICD-10-CM: O45.90  ICD-9-CM: 641.23  2020 - Present Yes        Fetal demise in ervin pregnancy greater than 22 weeks gestation, antepartum ICD-10-CM: O36. 4XX0  ICD-9-CM: 656.43  2020 - Present Unknown        Elevated blood pressure affecting pregnancy, antepartum ICD-10-CM: O16.9  ICD-9-CM: 642.33  2020 - Present Yes    Overview Addendum 2020 10:25 AM by Thom Fulton MD     Pre-E labs-WNL  P/C ratio-129 20 at [facility]:  BP mildly elevated at 136/82 (has been using Afrin nasal spray), no preeclamptic symptoms. PreE workup on --->  HELLP WNL; p/c ratio 129. Diet controlled gestational diabetes mellitus (GDM) in third trimester ICD-10-CM: O24.410  ICD-9-CM: 648.83  2020 - Present Yes    Overview Addendum 2020 10:25 AM by Thom Fulton MD     Failed 3 hr GTT.    2020 at [facility]:  Normal f/u Echo with limited views of ventricular septum. New finding of Fetal growth restriction, normal ZAINAB; reassuring fetal status.   AC 1%, overall 14%, ZAINAB WNL, UA Dopplers WNL, BPP 8/8.  Patient here for new GDM. Went to [facility] on 7/22. Reports FBG's normally 88-95 except this AM was 100 and 1hPP usually 117-126 except when experimenting with certain foods had a 451. Discussed low carb diet with increased protein. Will do elevated parameters due to FGR today. Uterine fibroid in pregnancy ICD-10-CM: O34.10, D25.9  ICD-9-CM: 654.10, 218.9  3/9/2020 - Present Yes    Overview Addendum 7/22/2020  9:54 AM by Kristine Kearns RN     3/9/2020 at [facility]: unlikely to have clinical significance  6/10/2020 at [facility]: Stable    See GDM Overview             Multigravida of advanced maternal age in third trimester ICD-10-CM: O09.523  ICD-9-CM: 659.63  2/18/2020 - Present Yes    Overview Addendum 7/29/2020 10:26 AM by Zena Allen MD     Referred to [facility]   28 week GTT-171  HGB-10.1-start daily iron OTC  3 hr GTT-  3/9/2020 at [facility]:  Normal NT and nasal bone. Declines genetic testing at this time due to financial concerns- will look into cost based on her deductible/insurance/income. Genetic counseling done by genetic counselor and MD.  4/29/2020 at [facility]:  Normal anatomy with limited fetal Echo due to fetal position. 7/23/2020 at [facility]: New finding of lagging fetal growth, shortened long bones. I am concerned that this finding, AMA and EIF increases risk of Trisomy-21. This was explained to the patient. IUFD due to catastrophic abruption 7/29/2020                Thyroid dysfunction in pregnancy, antepartum ICD-10-CM: O99.280, E07.9  ICD-9-CM: 648.13, 246.9  2/18/2020 - Present Yes    Overview Addendum 7/29/2020 10:24 AM by Zena Allen MD     3/13/20-TSH 0.007  T4-2.07  3/16/20-start methimazole 5 mg tid. Recheck 4 weeks.   TSH-  T4-    States hx of \"borderline\" thyroid- checked TSH/Free T4 with PN labs  2/18/20 TSH (0.018) Free T4 (2.25)  Per Dr. Melda Lombard- no treatment for now recheck in 2 weeks @ NOB exam  TSH/Free T4 @ 3/13/20 visit  3/9/2020 at [facility]:  Pt with mild hyperthyroidism on NOB labs; Pt has been self-treating with \"herbal thyroid remedy recommended by person at store based on my symptoms. \" She has 10 year history of periods of hair loss followed by regrowth (Has wig on today). Was recently told by someone \"in a waiting room\" that she had a goiter. Pt to bring herbal remedy to primary OB appt Friday. Recheck TSH, FT4, FT3 at OB appt  No goiter on exam.   2020 at [facility]:  Taking Methimazole 5 mg TID. Most recent TFT's drawn on 3/13, see above. No symptoms at this time. 20-TSH-0.075  T4-1.36-cont current dose  6/10/2020 at [facility]: Taking Methimazole 5 mg TID. Most recent TFT WNL on .  2020 at [facility]:  Continues to take Methimazole 5 mg TID. No recent labs since , see above. Lab Results   Component Value Date/Time    ABO/Rh(D) O POSITIVE 2020 09:55 AM    Rubella, External Immune 2020    ABO,Rh O positive 2020    There is no immunization history for the selected administration types on file for this patient. * Procedures:   Procedure(s):   SECTION           * Discharge Condition: stable    * Hospital Course: Postpartum course was complicated by acute blood loss anemia, which added 0 days to the patient's length of stay. Pt did spend time in icu - did receive 2 units of blood. She was deemed stable for discharge home on postop day #3. She also received Iv iron     * Disposition: Home    Discharge Medications:   Current Discharge Medication List      START taking these medications    Details   ibuprofen (MOTRIN) 800 mg tablet Take 1 Tab by mouth every eight (8) hours as needed for Pain. Qty: 30 Tab, Refills: 0      oxyCODONE-acetaminophen (PERCOCET 7.5) 7.5-325 mg per tablet Take 1 Tab by mouth every four (4) hours as needed for Pain for up to 3 days. Max Daily Amount: 6 Tabs.   Qty: 25 Tab, Refills: 0    Associated Diagnoses: Antepartum placental abruption      polyethylene glycol (MIRALAX) 17 gram packet Take 1 Packet by mouth daily. Qty: 30 Packet, Refills: 0      venlafaxine-SR (EFFEXOR-XR) 37.5 mg capsule Take 1 Cap by mouth daily (with breakfast). Indications: major depressive disorder  Qty: 30 Cap, Refills: 1      zolpidem (Ambien) 5 mg tablet Take 1 Tab by mouth nightly as needed for Sleep. Max Daily Amount: 5 mg. Qty: 30 Tab, Refills: 0    Associated Diagnoses: Antepartum placental abruption         CONTINUE these medications which have NOT CHANGED    Details   methIMAzole (TAPAZOLE) 5 mg tablet Take 1 Tab by mouth every eight (8) hours. Qty: 90 Tab, Refills: 3      multivit 47/iron/folate 1/dha (PNV-DHA PO) Take  by mouth. STOP taking these medications       Blood-Glucose Meter monitoring kit Comments:   Reason for Stopping:         glucose blood VI test strips (blood glucose test) strip Comments:   Reason for Stopping:         lancets misc Comments:   Reason for Stopping:         aspirin 81 mg chewable tablet Comments:   Reason for Stopping:               * Follow-up Care/Patient Instructions:   Activity: No sex for 6 weeks  Diet: Regular Diet  Wound Care: As directed    Follow-up Information     Follow up With Specialties Details Why Contact Info    Chloe Schmidt MD [address] [place]  [phone]

## 2020-08-01 NOTE — PROGRESS NOTES
Pt relaxing in chair. Dr Luiza Bradford and Dr Lonita Newton called as pt would like her thyroid medicine. Orders received. Handoff     Bedside report given to [facility], care relinquished.

## 2020-08-01 NOTE — PROGRESS NOTES
Post-Partum Day Number 3 Progress Note    Patient doing well  without significant complaints. Pain controlled on current medication. Voiding without difficulty, normal lochia. When I walked into room, pt tearful on phone with cousin. Told her that she cannot walk because her legs are swollen. No short of breath. Congested. Eating without nausea. Passing gas. Pt has not walked in [place]. Pt keeps questioning if she should have an autopsy. Discussed that baby looked perfect and baby did not cause abruption. Chromosomes were sent. Discussed that the blood collection could have happened because of her age, diabetes, htn, and unknown cause. She really does not understand but has been told multiple times by different sources. Vitals:    Visit Vitals  /75 (BP 1 Location: Right arm, BP Patient Position: At rest)   Pulse 82   Temp 98.6 °F (37 °C)   Resp 18   Wt 240 lb 4.8 oz (109 kg)   LMP 12/16/2019   SpO2 98%   BMI 39.99 kg/m²       Vital signs stable, afebrile. Exam:  Patient without distress. Heart rrr  Lungs cta b&s               Abdomen soft, fundus firm at level of umbilicus, nontender. Incision clean, dry and intact. Lower extremities are negative for swelling, cords or tenderness. Lab Results   Component Value Date/Time    ABO Group O 02/18/2020 01:42 PM    Rh (D) Positive 02/18/2020 01:42 PM    ABO/Rh(D) Bernadine [place] POSITIVE 07/29/2020 09:55 AM        Lab Results   Component Value Date/Time    ABO/Rh(D) Bernadine [place] POSITIVE 07/29/2020 09:55 AM    Antibody screen NEG 07/29/2020 09:55 AM    Antibody screen, External Negative 02/18/2020    Rubella, External Immune 02/18/2020    ABO,Rh O positive 02/18/2020     Lab Results   Component Value Date/Time    HGB 7.6 (L) 07/31/2020 05:56 AM    Hgb, External 11.8 02/18/2020         Assessment and Plan:  S/p abruption and demise. Discussed that her swelling is not in her legs. She complains of feeling tight in vaginal area and lower abdomen. Discussed this is from sitting in bed. She needs to use her incentive spirometer and walk. She is very sad - discussed support group and starting an antidepressant. Hopeful discharge home later today.

## 2020-08-01 NOTE — PROGRESS NOTES
Called to patient room she states that she got real cold and has been shaking  and that her breast have gotten hard and painful. Patient VSS temp 99.0 patient is having some edema in her extremities +1 encouraged patient that she needs to try to get up out of bed and move around more.

## 2020-08-01 NOTE — PROGRESS NOTES
[facility] called and asked if they had any thing that could help patient with her breast, was told that a tight bra, binding and some ice and motrin would help. Stacie Gonzalez supervisor will send up some ace wrap for patient to bind breast with.

## 2020-08-03 ENCOUNTER — APPOINTMENT (OUTPATIENT)
Dept: CT IMAGING | Age: 45
End: 2020-08-03
Attending: OBSTETRICS & GYNECOLOGY
Payer: COMMERCIAL

## 2020-08-03 ENCOUNTER — HOSPITAL ENCOUNTER (OUTPATIENT)
Age: 45
Setting detail: OBSERVATION
Discharge: HOME | End: 2020-08-06
Attending: OBSTETRICS & GYNECOLOGY | Admitting: OBSTETRICS & GYNECOLOGY
Payer: COMMERCIAL

## 2020-08-03 ENCOUNTER — APPOINTMENT (OUTPATIENT)
Dept: GENERAL RADIOLOGY | Age: 45
End: 2020-08-03
Attending: OBSTETRICS & GYNECOLOGY
Payer: COMMERCIAL

## 2020-08-03 DIAGNOSIS — O45.90 ANTEPARTUM PLACENTAL ABRUPTION: ICD-10-CM

## 2020-08-03 LAB
ALBUMIN SERPL-MCNC: 2.2 G/DL (ref 3.5–5)
ALBUMIN/GLOB SERPL: 0.5 {RATIO} (ref 1.2–3.5)
ALP SERPL-CCNC: 73 U/L (ref 50–130)
ALT SERPL-CCNC: 39 U/L (ref 12–65)
ANION GAP SERPL CALC-SCNC: 4 MMOL/L (ref 7–16)
APTT PPP: 33.3 SEC (ref 24.3–35.4)
AST SERPL-CCNC: 37 U/L (ref 15–37)
BASOPHILS # BLD: 0.1 K/UL (ref 0–0.2)
BASOPHILS NFR BLD: 0 % (ref 0–2)
BILIRUB SERPL-MCNC: 0.5 MG/DL (ref 0.2–1.1)
BNP SERPL-MCNC: 1461 PG/ML (ref 5–125)
BUN SERPL-MCNC: 12 MG/DL (ref 6–23)
CALCIUM SERPL-MCNC: 8.2 MG/DL (ref 8.3–10.4)
CHLORIDE SERPL-SCNC: 110 MMOL/L (ref 98–107)
CO2 SERPL-SCNC: 27 MMOL/L (ref 21–32)
CREAT SERPL-MCNC: 0.99 MG/DL (ref 0.6–1)
CREAT UR-MCNC: <30 MG/DL
DIFFERENTIAL METHOD BLD: ABNORMAL
DRUG TESTING, UMBILICAL CORD, XUMBDT: NORMAL
EOSINOPHIL # BLD: 0.5 K/UL (ref 0–0.8)
EOSINOPHIL NFR BLD: 3 % (ref 0.5–7.8)
ERYTHROCYTE [DISTWIDTH] IN BLOOD BY AUTOMATED COUNT: 18.9 % (ref 11.9–14.6)
FIBRINOGEN PPP-MCNC: 623 MG/DL (ref 190–501)
GLOBULIN SER CALC-MCNC: 4.1 G/DL (ref 2.3–3.5)
GLUCOSE SERPL-MCNC: 87 MG/DL (ref 65–100)
HCT VFR BLD AUTO: 25 % (ref 35.8–46.3)
HGB BLD-MCNC: 7.8 G/DL (ref 11.7–15.4)
IMM GRANULOCYTES # BLD AUTO: 0.5 K/UL (ref 0–0.5)
IMM GRANULOCYTES NFR BLD AUTO: 3 % (ref 0–5)
INR PPP: 1
LACTATE SERPL-SCNC: 1.1 MMOL/L (ref 0.4–2)
LDH SERPL L TO P-CCNC: 405 U/L (ref 100–190)
LYMPHOCYTES # BLD: 3.4 K/UL (ref 0.5–4.6)
LYMPHOCYTES NFR BLD: 18 % (ref 13–44)
MCH RBC QN AUTO: 28.9 PG (ref 26.1–32.9)
MCHC RBC AUTO-ENTMCNC: 31.2 G/DL (ref 31.4–35)
MCV RBC AUTO: 92.6 FL (ref 79.6–97.8)
MONOCYTES # BLD: 0.8 K/UL (ref 0.1–1.3)
MONOCYTES NFR BLD: 4 % (ref 4–12)
NEUTS SEG # BLD: 13.6 K/UL (ref 1.7–8.2)
NEUTS SEG NFR BLD: 72 % (ref 43–78)
NRBC # BLD: 0.27 K/UL (ref 0–0.2)
PLATELET # BLD AUTO: 245 K/UL (ref 150–450)
PMV BLD AUTO: 9.6 FL (ref 9.4–12.3)
POTASSIUM SERPL-SCNC: 4.6 MMOL/L (ref 3.5–5.1)
PROT SERPL-MCNC: 6.3 G/DL (ref 6.3–8.2)
PROT UR-MCNC: <5 MG/DL
PROT/CREAT UR-RTO: NORMAL
PROTHROMBIN TIME: 13.1 SEC (ref 12–14.7)
RBC # BLD AUTO: 2.7 M/UL (ref 4.05–5.2)
SODIUM SERPL-SCNC: 141 MMOL/L (ref 136–145)
TSH SERPL DL<=0.005 MIU/L-ACNC: 1.58 UIU/ML
URATE SERPL-MCNC: 5.3 MG/DL (ref 2.6–6)
WBC # BLD AUTO: 18.9 K/UL (ref 4.3–11.1)

## 2020-08-03 PROCEDURE — 93005 ELECTROCARDIOGRAM TRACING: CPT | Performed by: OBSTETRICS & GYNECOLOGY

## 2020-08-03 PROCEDURE — 84550 ASSAY OF BLOOD/URIC ACID: CPT

## 2020-08-03 PROCEDURE — 85384 FIBRINOGEN ACTIVITY: CPT

## 2020-08-03 PROCEDURE — 74011250637 HC RX REV CODE- 250/637: Performed by: OBSTETRICS & GYNECOLOGY

## 2020-08-03 PROCEDURE — 84439 ASSAY OF FREE THYROXINE: CPT

## 2020-08-03 PROCEDURE — 74011250636 HC RX REV CODE- 250/636: Performed by: OBSTETRICS & GYNECOLOGY

## 2020-08-03 PROCEDURE — 71046 X-RAY EXAM CHEST 2 VIEWS: CPT

## 2020-08-03 PROCEDURE — 99218 HC RM OBSERVATION: CPT

## 2020-08-03 PROCEDURE — 83605 ASSAY OF LACTIC ACID: CPT

## 2020-08-03 PROCEDURE — 85025 COMPLETE CBC W/AUTO DIFF WBC: CPT

## 2020-08-03 PROCEDURE — 96361 HYDRATE IV INFUSION ADD-ON: CPT

## 2020-08-03 PROCEDURE — 85730 THROMBOPLASTIN TIME PARTIAL: CPT

## 2020-08-03 PROCEDURE — 74011000258 HC RX REV CODE- 258: Performed by: OBSTETRICS & GYNECOLOGY

## 2020-08-03 PROCEDURE — 65270000029 HC RM PRIVATE

## 2020-08-03 PROCEDURE — 74011636320 HC RX REV CODE- 636/320: Performed by: OBSTETRICS & GYNECOLOGY

## 2020-08-03 PROCEDURE — 96374 THER/PROPH/DIAG INJ IV PUSH: CPT

## 2020-08-03 PROCEDURE — 71260 CT THORAX DX C+: CPT

## 2020-08-03 PROCEDURE — 84443 ASSAY THYROID STIM HORMONE: CPT

## 2020-08-03 PROCEDURE — 36415 COLL VENOUS BLD VENIPUNCTURE: CPT

## 2020-08-03 PROCEDURE — 85610 PROTHROMBIN TIME: CPT

## 2020-08-03 PROCEDURE — 83880 ASSAY OF NATRIURETIC PEPTIDE: CPT

## 2020-08-03 PROCEDURE — 99283 EMERGENCY DEPT VISIT LOW MDM: CPT | Performed by: OBSTETRICS & GYNECOLOGY

## 2020-08-03 PROCEDURE — 84156 ASSAY OF PROTEIN URINE: CPT

## 2020-08-03 PROCEDURE — 80053 COMPREHEN METABOLIC PANEL: CPT

## 2020-08-03 PROCEDURE — 83615 LACTATE (LD) (LDH) ENZYME: CPT

## 2020-08-03 RX ORDER — FUROSEMIDE 10 MG/ML
20 INJECTION INTRAMUSCULAR; INTRAVENOUS ONCE
Status: COMPLETED | OUTPATIENT
Start: 2020-08-03 | End: 2020-08-03

## 2020-08-03 RX ORDER — SODIUM CHLORIDE, SODIUM LACTATE, POTASSIUM CHLORIDE, CALCIUM CHLORIDE 600; 310; 30; 20 MG/100ML; MG/100ML; MG/100ML; MG/100ML
50 INJECTION, SOLUTION INTRAVENOUS CONTINUOUS
Status: DISCONTINUED | OUTPATIENT
Start: 2020-08-03 | End: 2020-08-06 | Stop reason: HOSPADM

## 2020-08-03 RX ORDER — SODIUM CHLORIDE 0.9 % (FLUSH) 0.9 %
5-40 SYRINGE (ML) INJECTION AS NEEDED
Status: DISCONTINUED | OUTPATIENT
Start: 2020-08-03 | End: 2020-08-06 | Stop reason: HOSPADM

## 2020-08-03 RX ORDER — SODIUM CHLORIDE 0.9 % (FLUSH) 0.9 %
5-40 SYRINGE (ML) INJECTION EVERY 8 HOURS
Status: DISCONTINUED | OUTPATIENT
Start: 2020-08-03 | End: 2020-08-06 | Stop reason: HOSPADM

## 2020-08-03 RX ORDER — SODIUM CHLORIDE 0.9 % (FLUSH) 0.9 %
10 SYRINGE (ML) INJECTION
Status: COMPLETED | OUTPATIENT
Start: 2020-08-03 | End: 2020-08-03

## 2020-08-03 RX ADMIN — FUROSEMIDE 20 MG: 10 INJECTION, SOLUTION INTRAMUSCULAR; INTRAVENOUS at 21:38

## 2020-08-03 RX ADMIN — SODIUM CHLORIDE 100 ML: 900 INJECTION, SOLUTION INTRAVENOUS at 22:05

## 2020-08-03 RX ADMIN — IOPAMIDOL 100 ML: 755 INJECTION, SOLUTION INTRAVENOUS at 22:05

## 2020-08-03 RX ADMIN — SODIUM CHLORIDE, SODIUM LACTATE, POTASSIUM CHLORIDE, AND CALCIUM CHLORIDE 100 ML/HR: 600; 310; 30; 20 INJECTION, SOLUTION INTRAVENOUS at 21:18

## 2020-08-03 RX ADMIN — HYDROCHLOROTHIAZIDE: 25 TABLET ORAL at 21:35

## 2020-08-03 RX ADMIN — Medication 10 ML: at 22:05

## 2020-08-03 NOTE — ADT AUTH CERT NOTES
Utilization Reviews  
 
   
Obstetric and Gynecologic Surgery or Procedure GRG - Care Day 3 (7/31/2020) by Kenia Wisdom RN  
 
   
Review Status  Review Entered Completed  7/31/2020 15:28   
   
Criteria Review Care Day: 3 Care Date: 7/31/2020 Level of Care: Inpatient Floor Guideline Day 3 Level Of Care ( ) * Activity level acceptable Clinical Status   
(X) * Abdominal status acceptable 7/31/2020 15:28:10 EDT by Daniel Kay   
  Abdomen soft, fundus firm at level of umbilicus, nontender.   
(X) * Hemodynamic stability 7/31/2020 15:28:10 EDT by Dimple Balint Resp18 O2 sat 97% RA (X) * No blood loss, or problem resolved ( ) * No infection, or status acceptable   
(X) * Operative site and other wounds acceptable 7/31/2020 15:28:10 EDT by Daniel Kay   
  Incision clean, dry and intact.   
(X) * Pain and nausea absent or adequately managed 7/31/2020 15:28:10 EDT by Daniel Kay   
  Motrin 600mg po q 6hrs prn x 2 doses Percocet 7.5 mg po 1 tab q 4hrs prn x 2 doses Percocet 7.5 mg 2 tabs q 4hrs prn x 1 dose   
(X) * Temperature status acceptable 7/31/2020 15:28:10 EDT by Daniel Kay   
  Temp98.1 °F (36.7 °C) (X) * Urinary status acceptable 7/31/2020 15:28:10 EDT by Daniel Kay   
  Voiding without difficulty ( ) * General Discharge Criteria met Interventions   
(X) * Intake acceptable 7/31/2020 15:28:10 EDT by Daniel Kay   
  Regular diet   
(X) * No inpatient interventions needed * Milestone Additional Notes 7/31/20 Patient doing well  without significant complaints.  Pain controlled on current medication.  Voiding without difficulty, normal lochia.     
    
  
Exam:  Patient without distress. Heart rrr   
Lungs cta b&s Lower extremities are negative for swelling, cords or tenderness.   
             
Assessment and Plan:  Pod#2 - s/p abruption and demise.  Overall pt doing well.  Acute blood loss anemia s/p 2 units prbcs.  Will follow.  Anticipate discharge home tomorrow. Iv iron today. WBC 17.7 Hgb 7.6 Hct 23.3 Plts 135 INR 1.0 Prothrombin time 13.4   
aPTT 32.2 Fibrinogen 496 Na 141   
K 3.8 Glucose 84 Creatinine 0.77 Calcium 7.8 Mg 2.1 No imaging Orders d/c arterial line   
activity as tolerated with assistance   
up ad arslan SCDs Colace 100mg po 2x/day Pepcid 20mg po q 24hrs Mylicon 22EM po 4x/day Ferrlecit 125 mg IV x 1 dose   
  
   
Obstetric and Gynecologic Surgery or Procedure GRG - Care Day 2 (2020) by Mio Ayala RN  
 
   
Review Status  Review Entered Completed  2020 12:49   
   
Criteria Review Care Day: 2 Care Date: 2020 Level of Care: Inpatient Floor Guideline Day 2 Clinical Status   
(X) * No ICU or intermediate care needs Interventions (X) Inpatient interventions continue 2020 12:49:34 EDT by Kim Brush LR @ 150 Ferrlecit 125 mg IV q day * Milestone Additional Notes 20 Patient post-op day 1 from  delivery without significant complaints.  Pain controlled on current medication.  Tired but doing OK.  Labs being drawn now     
  
Exam:  Patient without distress.   
             Abdomen soft, fundus firm at level of umbilicus.     
             Incision dry and clean without erythema small amount oozing just L of midline   
             Lower extremities are negative, cords or tenderness.   
    
Assessment and Plan:  Patient appears to be improving S/P 2 units PRBC's.  No further hypotensive episodes.  Will await these labs and if stable probable transfer to floor. Temp 97.8 Pulse 77 Resp 21 /87 O2 sat 100% RA   
  
WBC 16.5  -  16.6 Hgb 8.8  -  8.4 Hct 26.6  -  25.1 Plts 137  - [id number] INR 1.0 Prothrombin time 13.6   
aPTT 34.5 Fibrinogen 331 Na 139   
K 4.2 Glucose 114 Creatinine 0.80 No imaging Orders Transfuse 1 unit PRBC Transfer to L&D ICU   
PT-Active ROM Up ad arslan Activity as tolerated with assistance Clear liquid diet SCDs IS Percocet 7.5 mg 1 tab po q 4hrs prn x 1 dose Percocet 7.5 mg 2 tabs po q 4hrs prn x 2 doses Mylicon 11SZ po 4x/day

## 2020-08-04 LAB
ALBUMIN SERPL-MCNC: 2.7 G/DL (ref 3.5–5)
ALBUMIN/GLOB SERPL: 0.6 {RATIO} (ref 1.2–3.5)
ALP SERPL-CCNC: 81 U/L (ref 50–130)
ALT SERPL-CCNC: 39 U/L (ref 12–65)
ANION GAP SERPL CALC-SCNC: 7 MMOL/L (ref 7–16)
AST SERPL-CCNC: 28 U/L (ref 15–37)
ATRIAL RATE: 69 BPM
BILIRUB SERPL-MCNC: 0.5 MG/DL (ref 0.2–1.1)
BUN SERPL-MCNC: 9 MG/DL (ref 6–23)
CALCIUM SERPL-MCNC: 9.2 MG/DL (ref 8.3–10.4)
CALCULATED P AXIS, ECG09: 27 DEGREES
CALCULATED R AXIS, ECG10: 43 DEGREES
CALCULATED T AXIS, ECG11: 45 DEGREES
CHLORIDE SERPL-SCNC: 105 MMOL/L (ref 98–107)
CO2 SERPL-SCNC: 27 MMOL/L (ref 21–32)
CREAT SERPL-MCNC: 0.92 MG/DL (ref 0.6–1)
DIAGNOSIS, 93000: NORMAL
ERYTHROCYTE [DISTWIDTH] IN BLOOD BY AUTOMATED COUNT: 19.3 % (ref 11.9–14.6)
GLOBULIN SER CALC-MCNC: 4.3 G/DL (ref 2.3–3.5)
GLUCOSE SERPL-MCNC: 103 MG/DL (ref 65–100)
HCT VFR BLD AUTO: 27.3 % (ref 35.8–46.3)
HGB BLD-MCNC: 8.7 G/DL (ref 11.7–15.4)
MCH RBC QN AUTO: 28.9 PG (ref 26.1–32.9)
MCHC RBC AUTO-ENTMCNC: 31.9 G/DL (ref 31.4–35)
MCV RBC AUTO: 90.7 FL (ref 79.6–97.8)
NRBC # BLD: 0.15 K/UL (ref 0–0.2)
P-R INTERVAL, ECG05: 160 MS
PLATELET # BLD AUTO: 310 K/UL (ref 150–450)
PMV BLD AUTO: 8.8 FL (ref 9.4–12.3)
POTASSIUM SERPL-SCNC: 3.6 MMOL/L (ref 3.5–5.1)
PROT SERPL-MCNC: 7 G/DL (ref 6.3–8.2)
Q-T INTERVAL, ECG07: 382 MS
QRS DURATION, ECG06: 80 MS
QTC CALCULATION (BEZET), ECG08: 409 MS
RBC # BLD AUTO: 3.01 M/UL (ref 4.05–5.2)
SODIUM SERPL-SCNC: 139 MMOL/L (ref 136–145)
T4 FREE SERPL-MCNC: 1.2 NG/DL (ref 0.9–1.8)
VENTRICULAR RATE, ECG03: 69 BPM
WBC # BLD AUTO: 16.9 K/UL (ref 4.3–11.1)

## 2020-08-04 PROCEDURE — 74011250636 HC RX REV CODE- 250/636: Performed by: OBSTETRICS & GYNECOLOGY

## 2020-08-04 PROCEDURE — 96375 TX/PRO/DX INJ NEW DRUG ADDON: CPT

## 2020-08-04 PROCEDURE — 74011250637 HC RX REV CODE- 250/637: Performed by: OBSTETRICS & GYNECOLOGY

## 2020-08-04 PROCEDURE — 80053 COMPREHEN METABOLIC PANEL: CPT

## 2020-08-04 PROCEDURE — 65270000029 HC RM PRIVATE

## 2020-08-04 PROCEDURE — 74011250637 HC RX REV CODE- 250/637

## 2020-08-04 PROCEDURE — 74011000250 HC RX REV CODE- 250

## 2020-08-04 PROCEDURE — 85027 COMPLETE CBC AUTOMATED: CPT

## 2020-08-04 PROCEDURE — 36415 COLL VENOUS BLD VENIPUNCTURE: CPT

## 2020-08-04 PROCEDURE — 96376 TX/PRO/DX INJ SAME DRUG ADON: CPT

## 2020-08-04 PROCEDURE — 74011000250 HC RX REV CODE- 250: Performed by: OBSTETRICS & GYNECOLOGY

## 2020-08-04 PROCEDURE — 99223 1ST HOSP IP/OBS HIGH 75: CPT | Performed by: OBSTETRICS & GYNECOLOGY

## 2020-08-04 PROCEDURE — 99218 HC RM OBSERVATION: CPT

## 2020-08-04 PROCEDURE — 96361 HYDRATE IV INFUSION ADD-ON: CPT

## 2020-08-04 RX ORDER — POLYETHYLENE GLYCOL 3350 17 G/17G
17 POWDER, FOR SOLUTION ORAL DAILY
Status: DISCONTINUED | OUTPATIENT
Start: 2020-08-04 | End: 2020-08-05 | Stop reason: ALTCHOICE

## 2020-08-04 RX ORDER — LABETALOL HYDROCHLORIDE 5 MG/ML
INJECTION, SOLUTION INTRAVENOUS
Status: COMPLETED
Start: 2020-08-04 | End: 2020-08-04

## 2020-08-04 RX ORDER — ZOLPIDEM TARTRATE 5 MG/1
5 TABLET ORAL
Status: DISCONTINUED | OUTPATIENT
Start: 2020-08-04 | End: 2020-08-06 | Stop reason: HOSPADM

## 2020-08-04 RX ORDER — IBUPROFEN 600 MG/1
600 TABLET ORAL
Status: DISCONTINUED | OUTPATIENT
Start: 2020-08-04 | End: 2020-08-06 | Stop reason: HOSPADM

## 2020-08-04 RX ORDER — LABETALOL HYDROCHLORIDE 5 MG/ML
40 INJECTION, SOLUTION INTRAVENOUS ONCE
Status: COMPLETED | OUTPATIENT
Start: 2020-08-04 | End: 2020-08-04

## 2020-08-04 RX ORDER — FUROSEMIDE 10 MG/ML
40 INJECTION INTRAMUSCULAR; INTRAVENOUS ONCE
Status: COMPLETED | OUTPATIENT
Start: 2020-08-04 | End: 2020-08-04

## 2020-08-04 RX ORDER — LABETALOL HYDROCHLORIDE 5 MG/ML
80 INJECTION, SOLUTION INTRAVENOUS ONCE
Status: COMPLETED | OUTPATIENT
Start: 2020-08-04 | End: 2020-08-04

## 2020-08-04 RX ORDER — POTASSIUM CHLORIDE 20 MEQ/1
20 TABLET, EXTENDED RELEASE ORAL 2 TIMES DAILY
Status: DISCONTINUED | OUTPATIENT
Start: 2020-08-04 | End: 2020-08-06 | Stop reason: HOSPADM

## 2020-08-04 RX ORDER — LABETALOL HYDROCHLORIDE 5 MG/ML
20 INJECTION, SOLUTION INTRAVENOUS ONCE
Status: COMPLETED | OUTPATIENT
Start: 2020-08-04 | End: 2020-08-04

## 2020-08-04 RX ORDER — SODIUM CHLORIDE 0.9 % (FLUSH) 0.9 %
5-40 SYRINGE (ML) INJECTION EVERY 8 HOURS
Status: DISCONTINUED | OUTPATIENT
Start: 2020-08-04 | End: 2020-08-06 | Stop reason: HOSPADM

## 2020-08-04 RX ORDER — ACETAMINOPHEN 500 MG
1000 TABLET ORAL ONCE
Status: COMPLETED | OUTPATIENT
Start: 2020-08-04 | End: 2020-08-04

## 2020-08-04 RX ORDER — OXYCODONE AND ACETAMINOPHEN 7.5; 325 MG/1; MG/1
1 TABLET ORAL
Status: DISCONTINUED | OUTPATIENT
Start: 2020-08-04 | End: 2020-08-06 | Stop reason: HOSPADM

## 2020-08-04 RX ORDER — FLUTICASONE PROPIONATE 50 MCG
2 SPRAY, SUSPENSION (ML) NASAL DAILY
Status: DISCONTINUED | OUTPATIENT
Start: 2020-08-04 | End: 2020-08-06 | Stop reason: HOSPADM

## 2020-08-04 RX ORDER — VENLAFAXINE HYDROCHLORIDE 37.5 MG/1
37.5 CAPSULE, EXTENDED RELEASE ORAL
Status: DISCONTINUED | OUTPATIENT
Start: 2020-08-04 | End: 2020-08-06 | Stop reason: HOSPADM

## 2020-08-04 RX ORDER — SODIUM CHLORIDE 0.9 % (FLUSH) 0.9 %
5-40 SYRINGE (ML) INJECTION AS NEEDED
Status: DISCONTINUED | OUTPATIENT
Start: 2020-08-04 | End: 2020-08-06 | Stop reason: HOSPADM

## 2020-08-04 RX ORDER — METHIMAZOLE 5 MG/1
5 TABLET ORAL EVERY 8 HOURS
Status: DISCONTINUED | OUTPATIENT
Start: 2020-08-04 | End: 2020-08-06 | Stop reason: HOSPADM

## 2020-08-04 RX ORDER — ACETAMINOPHEN 500 MG
TABLET ORAL
Status: COMPLETED
Start: 2020-08-04 | End: 2020-08-04

## 2020-08-04 RX ORDER — LORATADINE 10 MG/1
10 TABLET ORAL
Status: DISCONTINUED | OUTPATIENT
Start: 2020-08-04 | End: 2020-08-06 | Stop reason: HOSPADM

## 2020-08-04 RX ADMIN — LABETALOL HYDROCHLORIDE 40 MG: 5 INJECTION INTRAVENOUS at 05:13

## 2020-08-04 RX ADMIN — Medication 10 ML: at 08:25

## 2020-08-04 RX ADMIN — POTASSIUM CHLORIDE 20 MEQ: 1500 TABLET, EXTENDED RELEASE ORAL at 13:18

## 2020-08-04 RX ADMIN — LABETALOL HYDROCHLORIDE 80 MG: 5 INJECTION, SOLUTION INTRAVENOUS at 06:48

## 2020-08-04 RX ADMIN — SALINE NASAL SPRAY 2 SPRAY: 1.5 SOLUTION NASAL at 12:33

## 2020-08-04 RX ADMIN — HYDROCHLOROTHIAZIDE: 25 TABLET ORAL at 21:46

## 2020-08-04 RX ADMIN — VENLAFAXINE HYDROCHLORIDE 37.5 MG: 37.5 CAPSULE, EXTENDED RELEASE ORAL at 11:55

## 2020-08-04 RX ADMIN — LABETALOL HYDROCHLORIDE 80 MG: 5 INJECTION INTRAVENOUS at 06:48

## 2020-08-04 RX ADMIN — METHIMAZOLE 5 MG: 5 TABLET ORAL at 21:46

## 2020-08-04 RX ADMIN — OXYCODONE HYDROCHLORIDE AND ACETAMINOPHEN 1 TABLET: 7.5; 325 TABLET ORAL at 14:40

## 2020-08-04 RX ADMIN — OXYCODONE HYDROCHLORIDE AND ACETAMINOPHEN 1 TABLET: 7.5; 325 TABLET ORAL at 09:17

## 2020-08-04 RX ADMIN — FUROSEMIDE 40 MG: 10 INJECTION, SOLUTION INTRAMUSCULAR; INTRAVENOUS at 08:18

## 2020-08-04 RX ADMIN — POTASSIUM CHLORIDE 20 MEQ: 1500 TABLET, EXTENDED RELEASE ORAL at 20:15

## 2020-08-04 RX ADMIN — LABETALOL HYDROCHLORIDE 20 MG: 5 INJECTION, SOLUTION INTRAVENOUS at 02:10

## 2020-08-04 RX ADMIN — METHIMAZOLE 5 MG: 5 TABLET ORAL at 14:39

## 2020-08-04 RX ADMIN — LABETALOL HYDROCHLORIDE 20 MG: 5 INJECTION INTRAVENOUS at 02:10

## 2020-08-04 RX ADMIN — ACETAMINOPHEN 1000 MG: 500 TABLET, FILM COATED ORAL at 02:09

## 2020-08-04 RX ADMIN — Medication 1000 MG: at 02:09

## 2020-08-04 RX ADMIN — FLUTICASONE PROPIONATE 2 SPRAY: 50 SPRAY, METERED NASAL at 12:33

## 2020-08-04 RX ADMIN — IBUPROFEN 600 MG: 600 TABLET, FILM COATED ORAL at 14:40

## 2020-08-04 RX ADMIN — Medication 10 ML: at 21:46

## 2020-08-04 RX ADMIN — OXYCODONE HYDROCHLORIDE AND ACETAMINOPHEN 1 TABLET: 7.5; 325 TABLET ORAL at 21:59

## 2020-08-04 RX ADMIN — IBUPROFEN 600 MG: 600 TABLET, FILM COATED ORAL at 09:17

## 2020-08-04 RX ADMIN — METHIMAZOLE 5 MG: 5 TABLET ORAL at 09:16

## 2020-08-04 NOTE — PROGRESS NOTES
Called to [facility], patient needs 20 G in [facility], and creatinine level before ct can be done. Also no transport is available after hours, nurse will need to bring patient to ct.  RN to call back when patient is ready

## 2020-08-04 NOTE — PROGRESS NOTES
Spoke To [facility] cardiology  On call. About consult. Ordered Echo. It was already ordered at [phone] today. He is unsure if Dr Jane Koch was aware of consult. Dr [place] he will have someone check see her first thing in the morning.

## 2020-08-04 NOTE — PROGRESS NOTES
Phoned Dr. Megan Reeves about 2 back to back high BP and patient has a headache. Start labetalol protocal. Give tylenol 1,000mg for headache.

## 2020-08-04 NOTE — CONSULTS
Maternal Fetal Medicine Consult Note      Requesting MD- Je    Chief Complaint:  Recent Pregnancy and swelling and shortness of breath. History of Present Illness: 40 y.o.  POD#6 s/p emergent  at 32w2d gestation due to maternal hemodynamic instability with abruption and IUFD. Pt presented to [facility] last pm with 3 pillow orthopnea, worsening LE edema, headache, and elevated BP. Upon admission, found to have elevated BNP which in combination with labile severely elevated BP and generalized edema with dyspnea/orthopnea is concerning for developing cardiomyopathy versus right heart strain due to fluid shifts. Cardiac echo pending. At this time, pt received 20mg IV lasix x1, captopril/hctz x 1, and now lasix 40mg IV x1. Has had good response with >1800ml UOP with first dose of lasix. Pt reports improved breathing this am. Still with significant leg edema. While hospitalized initially, pt receive 2uPRBC and IV iron x 3 doses. Throughout that course, pt had labile BP with wide swings. Even when significantly hypotensive, never mounted reflexive tachycardia response. Was not discharged on blood pressure medications as well-controlled at that time. Overnight, required IV labetalol 20mg x 2 for severe BP. Pt with hx hyperthyroidism with normal TFTs at this time. Is on tapazole. Discharged home on effexor, not seeing an impact yet. Tearful but appropriate. Reassuring EKG and CXR. CT chest without PE. No coagulopathy. CBC stable. CMP improved from prior. Patient denies abdominal pain, vision changes. No significant reflux, nausea, constipation, or other GI complaints. This am, pt noted to be using afrin nasal spray excessively. She states this is a long-term medication. Has in the past tried to wean off but the rebound congestion is significant. Claritin/Zyrtec-D both help.  Flonase in the past.       OB History    Para Term  AB Living   2 2 1 1 0 1 SAB TAB Ectopic Molar Multiple Live Births   0 0 0 0 0 1      # Outcome Date GA Lbr Dewayne/2nd Weight Sex Delivery Anes PTL Lv   2  20 32w2d  3 lb 2 oz (1.417 kg)  CS-LTranv Gen Y FD      Complications: Uterine Rupture   1 Term 05 42w0d  7 lb 6 oz (3.345 kg) M Vag-Spont   MARTHA      Birth Comments: [facility])       Past Surgical History:   Procedure Laterality Date    HX WISDOM TEETH EXTRACTION       Past Medical History:   Diagnosis Date    Anemia     has never had blood transfusion    Anxiety     Chlamydia     Depression     Genital herpes     no issues since    Hypertension in pregnancy, preeclampsia, severe, delivered/postpartum 8/3/2020    Hypothyroidism     states \"borderline\"    LGSIL on Pap smear of cervix 3/19/2020     Family History   Problem Relation Age of Onset    Diabetes Mother     Hypertension Mother     Cancer Maternal Grandmother         Uterine cancer?     Cancer Paternal Grandmother         Lung Cancer- smoker     Social History     Socioeconomic History    Marital status: SINGLE     Spouse name: Not on file    Number of children: Not on file    Years of education: Not on file    Highest education level: Not on file   Occupational History    Not on file   Social Needs    Financial resource strain: Not on file    Food insecurity     Worry: Not on file     Inability: Not on file    Transportation needs     Medical: Not on file     Non-medical: Not on file   Tobacco Use    Smoking status: Never Smoker    Smokeless tobacco: Never Used   Substance and Sexual Activity    Alcohol use: Yes     Comment: prior to +UPT    Drug use: Not Currently    Sexual activity: Yes     Partners: Male     Birth control/protection: None   Lifestyle    Physical activity     Days per week: Not on file     Minutes per session: Not on file    Stress: Not on file   Relationships    Social connections     Talks on phone: Not on file     Gets together: Not on file     Attends Anabaptist service: Not on file     Active member of club or organization: Not on file     Attends meetings of clubs or organizations: Not on file     Relationship status: Not on file    Intimate partner violence     Fear of current or ex partner: Not on file     Emotionally abused: Not on file     Physically abused: Not on file     Forced sexual activity: Not on file   Other Topics Concern    Not on file   Social History Narrative    Not on file     No Known Allergies      Current Facility-Administered Medications:     sodium chloride (OCEAN) 0.65 % nasal squeeze bottle 2 Spray, 2 Spray, Both Nostrils, Q2H PRN, Kike Saldivar MD, 2 Spray at 08/04/20 1233    fluticasone propionate (FLONASE) 50 mcg/actuation nasal spray 2 Spray, 2 Spray, Both Nostrils, DAILY, Kike Saldivar MD, 2 Spray at 08/04/20 1233    loratadine (CLARITIN) tablet 10 mg, 10 mg, Oral, DAILY PRN, Kike Saldivar MD    sodium chloride (NS) flush 5-40 mL, 5-40 mL, IntraVENous, Q8H, Stephanie Saldivar MD, 10 mL at 08/04/20 0825    sodium chloride (NS) flush 5-40 mL, 5-40 mL, IntraVENous, PRN, Kike Saldivar MD    oxyCODONE-acetaminophen (PERCOCET 7.5) 7.5-325 mg per tablet 1 Tab, 1 Tab, Oral, Q4H PRN, Nilay Zhao MD, 1 Tab at 08/04/20 0917    methIMAzole (TAPAZOLE) tablet 5 mg, 5 mg, Oral, Q8H, Kike Saldivar MD, 5 mg at 08/04/20 0916    ibuprofen (MOTRIN) tablet 600 mg, 600 mg, Oral, Q6H PRN, Kike Saldivar MD, 600 mg at 08/04/20 0917    polyethylene glycol (MIRALAX) packet 17 g, 17 g, Oral, DAILY, Kike Saldivar MD, Stopped at 08/04/20 1000    venlafaxine-SR (EFFEXOR-XR) capsule 37.5 mg, 37.5 mg, Oral, DAILY WITH BREAKFAST, Kike Saldivar MD, 37.5 mg at 08/04/20 1155    zolpidem (AMBIEN) tablet 5 mg, 5 mg, Oral, QHS PRN, Kike Saldivar MD    potassium chloride (K-DUR, KLOR-CON) SR tablet 20 mEq, 20 mEq, Oral, BID, Kike Saldivar MD    sodium chloride (NS) flush 5-40 mL, 5-40 mL, IntraVENous, Q8H, Yvette South MD    sodium chloride (NS) flush 5-40 mL, 5-40 mL, IntraVENous, PRN, Yvette South MD    lactated Ringers infusion, 50 mL/hr, IntraVENous, CONTINUOUS, Latosha Ocasio DO, Last Rate: 50 mL/hr at 08/04/20 0003, 50 mL/hr at 08/04/20 0003    captopril/hydroCHLOROthiazide (CAPOZIDE) 25/25 mg, , Oral, DAILY, Yvette South MD    Review of Systems  A comprehensive review of systems was negative except for that written in the HPI.      Objective:     Vitals:     Patient Vitals for the past 24 hrs:   Temp Pulse Resp BP SpO2   08/04/20 1136 -- 80 -- 138/79 --   08/04/20 1108 -- 73 -- 159/75 --   08/04/20 1107 -- 76 -- (!) 177/92 --   08/04/20 1037 -- 70 -- (!) 182/92 --   08/04/20 1006 -- 67 -- 150/76 --   08/04/20 0959 -- 70 -- 149/82 --   08/04/20 0837 -- 66 -- 172/84 --   08/04/20 0824 -- 68 -- 163/79 --   08/04/20 0818 98.7 °F (37.1 °C) 68 18 163/79 --   08/04/20 0707 -- 76 -- 158/77 --   08/04/20 0640 -- 63 -- 181/88 --   08/04/20 0609 -- 66 -- (!) 189/92 --   08/04/20 0545 -- 75 -- 168/86 --   08/04/20 0509 -- 71 -- (!) 174/93 --   08/04/20 0457 -- 72 -- 162/83 --   08/04/20 0409 -- 68 -- 166/87 --   08/04/20 0339 -- 71 -- 173/85 --   08/04/20 0309 -- 72 -- 158/88 --   08/04/20 0239 -- 68 -- (!) 177/93 --   08/04/20 0213 -- 72 -- 152/80 --   08/04/20 0139 -- 85 -- 174/79 --   08/04/20 0110 -- 67 -- 174/90 --   08/04/20 0039 -- 75 -- 163/77 --   08/04/20 0009 -- 71 -- 179/89 --   08/03/20 2338 -- 67 -- 157/77 --   08/03/20 2314 -- 69 -- 172/81 --   08/03/20 2253 -- 64 -- 163/76 --   08/03/20 2135 -- 68 -- (!) 185/96 --   08/03/20 2030 -- 79 -- 142/84 --   08/03/20 2016 -- 71 -- 163/90 --   08/03/20 2000 -- 85 -- 155/86 98 %   08/03/20 1945 -- 76 -- (!) 160/93 --   08/03/20 1932 99.8 °F (37.7 °C) 75 17 172/89 --        I&O:  08/04 0701 - 08/04 1900  In: 491 [I.V.:491]  Out: 1800 [Urine:1800]            08/02 1901 - 08/04 0700  In: -   Out: 0348 [Urine:2360]  Output by Drain (mL) 08/02/20 0701 - 08/02/20 1900 08/02/20 1901 - 08/03/20 0700 08/03/20 0701 - 08/03/20 1900 08/03/20 1901 - 08/04/20 0700 08/04/20 0701 - 08/04/20 1243   Patient has no LDAs of requested type attached. Exam:   Patient without distress. Abdomen: soft, non-tender. Bowel sounds normal.   Fundus: soft and non tender  Lower Extremity Edema: 2+  Patellar Reflexes: 1+ bilaterally  Skin: normal coloration and turgor, no rashes, no suspicious skin lesions noted. Neurologic: negative  Psychiatric: non focal, appropriate.      Labs:   CBC:    Recent Labs     08/04/20  0902 08/03/20  2106 07/31/20  0556 07/30/20  0839 07/30/20  0324 07/29/20  2009 07/29/20  1411 07/29/20  1150 07/29/20  1119 07/29/20  0954 07/29/20  0700 07/29/20  0630 07/16/20  1019 06/25/20  1056 02/18/20  1342 02/18/20   WBC 16.9* 18.9* 17.7* 16.6* 16.5* 16.0* 22.6* 21.4* 18.2* 24.0* 15.3* 15.3* 11.6*  --  11.0*  --    HGB 8.7* 7.8* 7.6* 8.4* 8.8* 6.2* 7.6* 7.0* 6.8* 8.2* 8.5* 10.8* 11.4 10.1* 11.8  --    HCT 27.3* 25.0* 23.3* 25.1* 26.6* 19.6* 23.8* 21.9* 21.6* 25.3* 27.3* 35.9 36.3  --  34.4  --     245 135* 123 137* 133* 132* 139* 136* 180 174 229 372  --  352  --    HGBEXT  --   --   --   --   --   --   --   --   --   --   --   --   --   --   --  11.8   HCTEXT  --   --   --   --   --   --   --   --   --   --   --   --   --   --   --  34.4   PLTEXT  --   --   --   --   --   --   --   --   --   --   --   --   --   --   --  352       CMP:   Recent Labs     08/04/20  0902 08/03/20  2106 07/31/20  0556 07/30/20  0839 07/29/20 2009 07/29/20  1411 07/29/20  0954 07/29/20  0700 07/29/20  0630 07/16/20  1019 07/02/20  0818    141 141 139 139 136 137 135* 135* 137  --    K 3.6 4.6 3.8 4.2 4.0 5.2* 4.1 4.6 4.0 4.5  --     110* 112* 114* 113* 112* 110* 109* 108* 106  --    CO2 27 27 23 21 18* 19* 18* 17* 17* 18*  --    AGAP 7 4* 6* 4* 8 5* 9 9 10  --   --    * 87 84 114* 133* 111* 148* 122* 119* 85 81   BUN 9 12 7 9 18 22 24* 21 22 13 --    CREA 0.92 0.99 0.77 0.80 0.91 0.91 1.00 0.97 0.88 0.78  --    GFRAA >60 >60 >60 >60 >60 >60 >60 >60 >60 107  --    GFRNA >60 >60 >60 >60 >60 >60 >60 >60 >60 93  --    CA 9.2 8.2* 7.8* 7.6* 7.2* 7.4* 7.6* 7.6* 8.8 9.4  --    MG  --   --  2.1  --   --  1.5*  --   --   --   --   --    PHOS  --   --   --   --   --  3.0  --   --   --   --   --    ALB 2.7* 2.2* 1.9* 1.9* 1.8* 2.0*  --   --  2.6* 3.6*  --    TP 7.0 6.3 5.2* 4.8* 4.4* 4.9*  --   --  6.7 6.1  --    GLOB 4.3* 4.1* 3.3 2.9 2.6 2.9  --   --  4.1*  --   --    AGRAT 0.6* 0.5* 0.6* 0.7* 0.7* 0.7*  --   --  0.6* 1.4  --    ALT 39 39 80* 98* 40 20  --   --  15 14  --        Recent Labs     20  2106 20  0954 20  0700 20  0630 20  1019   URICA 5.3 4.8 4.5 4.8 5.0   *  --  223*  --  159       COAGS:    Recent Labs     20  2252 20  0556 20  0839 20  1411 20  1130 20  0630   APTT 33.3 32.2 34.5 31.6 33.2 40.5* 27.6   PTP 13.1 13.4 13.6 14.6 15.6* 16.6* 13.4   INR 1.0 1.0 1.0 1.1 1.2 1.3 1.0       Recent Glucose Results: Recent Glucose Results:   Recent Labs     20  0902 20  2106 20  0556 20  0839 20  2009 20  1411 20  0954 20  0701 20  0700 20  0630 20  1019 20  0818   * 87 84 114* 133* 111* 148*  --  122* 119* 85 80   GLUCPOC  --   --   --   --   --   --   --  73 57*  --   --   --        Prenatal Labs:    Lab Results   Component Value Date/Time    Rubella, External Immune 2020    HBsAg, External Negative 2020    HIV, External Non-Reactive 2020    RPR, External Non-Reactive 2020    Gonorrhea, External negative 2020    Chlamydia, External negative 2020     Assessment and Plan:  40 y.o.  POD6 s/p emergent cs with catastrophic abruption. PP fluid shifts, blood pressure exacerbation, possible pp cardiomyopathy.    · HOLD AFRIN!  · [facility] spray  · flonase  · claritin    Lasix 40 x 1 this am    Echo today  Ace/HCTZ daily; currently not using beta blocker due to concern for heart rate. Will need additional BP control, but will see results of echo to aid in choice. Percocet prn    Effexor; other mood agent as needed    miralax qday    Ambulate as tolerated, needs to increase    ambien prn qhs    At this time, I do not feel patient needs magnesium for eclampsia prevention. Findings seem to be more consistent with fluid shifts. Time: 90  Minutes spent on floor,with greater than 50% of the time examining patient, explaining plan and coordinating care with nurse and requesting primary physician.

## 2020-08-04 NOTE — PROGRESS NOTES
Chart reviewed - Patient readmitted last night for fluid retention and hypertension. Sadly, patient experienced an IUFD on 7/29/20 and was subsequently discharged from the hospital on 8/1/20. SW met with patient while practicing social distancing. Patient states that she's feeling somewhat better at this time. Her boyfriend stayed with her in the hospital overnight, but he has now gone to work. SW will contact [facility] to meet with patient and provide assistance with Medicaid application. Patient was provided with phone # for [facility]. Patient was previously provided with information on applying for SNAP/Food [place]. Patient denied any needs from  at this time. Patient has this 's phone # for additional needs/questions. SW will continue to follow.     BI Gottlieb  [place]   [phone]

## 2020-08-04 NOTE — H&P
Subjective:     Byron Quiros is a 40 y.o.  premenopausal female who presents with worsening SOB, 3 pillow orthopnea, HA, and lower and upper extremity swelling. Pt is 5 days s/p emergent C section for IUFD and overwhelming placental abruption. Pt did receive multiple units of prbcs postpartum. Pt does have a h/o of hyperthyroidism and was on methimazol for a portion of the pregnancy. Pt denies any past h/o cardiac or pulmonary disease. Pt denies nausea, vomiting, fevers, chills, cough, muscle aches, UTI or GI symptoms.         Patient Active Problem List    Diagnosis Date Noted    Hypertension in pregnancy, preeclampsia, severe, delivered/postpartum 08/03/2020    Antepartum placental abruption 07/29/2020    Fetal demise in ervin pregnancy greater than 22 weeks gestation, antepartum 07/29/2020    Poor fetal growth, affecting management of mother, antepartum condition or complication 43/14/6395    Elevated blood pressure affecting pregnancy, antepartum 07/20/2020    Diet controlled gestational diabetes mellitus (GDM) in third trimester 07/06/2020    Back pain affecting pregnancy in third trimester 06/10/2020    LGSIL on Pap smear of cervix 03/19/2020    Obesity affecting pregnancy in third trimester 03/09/2020    Uterine fibroid in pregnancy 03/09/2020    Multigravida of advanced maternal age in third trimester 02/18/2020    H/O herpes simplex infection 02/18/2020    Hereditary disease in family possibly affecting fetus 02/18/2020    Thyroid dysfunction in pregnancy, antepartum 02/18/2020     Past Medical History:   Diagnosis Date    Anemia     has never had blood transfusion    Anxiety     Chlamydia     Depression     Genital herpes 2007    no issues since    Hypertension in pregnancy, preeclampsia, severe, delivered/postpartum 8/3/2020    Hypothyroidism     states \"borderline\"    LGSIL on Pap smear of cervix 3/19/2020      Past Surgical History:   Procedure Laterality Date    HX WISDOM TEETH EXTRACTION        Social History     Tobacco Use    Smoking status: Never Smoker    Smokeless tobacco: Never Used   Substance Use Topics    Alcohol use: Yes     Comment: prior to +UPT      Family History   Problem Relation Age of Onset    Diabetes Mother     Hypertension Mother     Cancer Maternal Grandmother         Uterine cancer?  Cancer Paternal Grandmother         Lung Cancer- smoker      Prior to Admission Medications   Prescriptions Last Dose Informant Patient Reported? Taking?   ibuprofen (MOTRIN) 800 mg tablet 8/3/2020 at Unknown time  No Yes   Sig: Take 1 Tab by mouth every eight (8) hours as needed for Pain. methIMAzole (TAPAZOLE) 5 mg tablet Not Taking at Unknown time  No No   Sig: Take 1 Tab by mouth every eight (8) hours. multivit 47/iron/folate 1/dha (PNV-DHA PO) Not Taking at Unknown time  Yes No   Sig: Take  by mouth. oxyCODONE-acetaminophen (PERCOCET 7.5) 7.5-325 mg per tablet 8/3/2020 at Unknown time  No Yes   Sig: Take 1 Tab by mouth every four (4) hours as needed for Pain for up to 3 days. Max Daily Amount: 6 Tabs. polyethylene glycol (MIRALAX) 17 gram packet 7/27/2020 at Unknown time  No Yes   Sig: Take 1 Packet by mouth daily. venlafaxine-SR (EFFEXOR-XR) 37.5 mg capsule 8/3/2020 at Unknown time  No Yes   Sig: Take 1 Cap by mouth daily (with breakfast). Indications: major depressive disorder   zolpidem (Ambien) 5 mg tablet 8/2/2020 at Unknown time  No Yes   Sig: Take 1 Tab by mouth nightly as needed for Sleep. Max Daily Amount: 5 mg. Facility-Administered Medications: None     No Known Allergies     Review of Systems:  10 point ROS,  A comprehensive review of systems was negative except for that written in the History of Present Illness.      Objective:     Patient Vitals for the past 8 hrs:   BP Temp Pulse Resp SpO2   08/03/20 2000 155/86 -- 85 -- 98 %   08/03/20 1945 (!) 160/93 -- 76 -- --   08/03/20 1932 172/89 99.8 °F (37.7 °C) 75 17 -- Temp (24hrs), Av.8 °F (37.7 °C), Min:99.8 °F (37.7 °C), Max:99.8 °F (37.7 °C)    No intake/output data recorded. Physical Exam:   Visit Vitals  /86   Pulse 85   Temp 99.8 °F (37.7 °C)   Resp 17   LMP 2019   SpO2 98%   General: A&O times 3 in NAD  Heart: RRR with S3 gallop  Lungs: CTA  Abd: soft, distended, + TTP of the RUQ; no guarding or peritoneal signs; no HSM  Incision: healing well without evidence of infection  LE: mild swelling of feet and ankles; negative nelly's sign        Assessment:     Active Problems:    Hypertension in pregnancy, preeclampsia, severe, delivered/postpartum (8/3/2020)      New onset edema and worsening SOB. Need to rule out pulmonary edema, pulmonary embolus, and postpartum cardiomyopathy. Plan:     I reviewed with Tori Steele her medical records, physical exam, and review of symptoms. Obtain the following: CBC, CMP, uric acid, PCR, LDH, BNP, lactic acid, TSH, free T4, CXR, and pulmonary CT. Consider cardiac echo in the AM.    Administer lasix 20mg IV now. Start capozide 25/25 po daily. Pt management d/w Dr. Rachid Blake (M) and Dr. Alfredo Ron (pt's PObP).     Signed By: Aparna Duggan MD     August 3, 2020

## 2020-08-04 NOTE — PROGRESS NOTES
Spiritual care visit with patient who is known to me from previous admission for 32 week fetal demise. Patient shared openly her feelings regarding the death of her baby boy. Continued prayer and support given. Isaac Claude, M.Div.

## 2020-08-05 LAB
ALBUMIN SERPL-MCNC: 2.4 G/DL (ref 3.5–5)
ALBUMIN/GLOB SERPL: 0.6 {RATIO} (ref 1.2–3.5)
ALP SERPL-CCNC: 66 U/L (ref 50–130)
ALT SERPL-CCNC: 27 U/L (ref 12–65)
ANION GAP SERPL CALC-SCNC: 5 MMOL/L (ref 7–16)
AST SERPL-CCNC: 21 U/L (ref 15–37)
BASOPHILS # BLD: 0 K/UL (ref 0–0.2)
BASOPHILS NFR BLD: 0 % (ref 0–2)
BILIRUB SERPL-MCNC: 0.4 MG/DL (ref 0.2–1.1)
BUN SERPL-MCNC: 8 MG/DL (ref 6–23)
CALCIUM SERPL-MCNC: 8.7 MG/DL (ref 8.3–10.4)
CHLORIDE SERPL-SCNC: 105 MMOL/L (ref 98–107)
CO2 SERPL-SCNC: 28 MMOL/L (ref 21–32)
CREAT SERPL-MCNC: 0.97 MG/DL (ref 0.6–1)
DIFFERENTIAL METHOD BLD: ABNORMAL
EOSINOPHIL # BLD: 0.7 K/UL (ref 0–0.8)
EOSINOPHIL NFR BLD: 6 % (ref 0.5–7.8)
ERYTHROCYTE [DISTWIDTH] IN BLOOD BY AUTOMATED COUNT: 18.8 % (ref 11.9–14.6)
GLOBULIN SER CALC-MCNC: 4 G/DL (ref 2.3–3.5)
GLUCOSE SERPL-MCNC: 106 MG/DL (ref 65–100)
HCT VFR BLD AUTO: 28.7 % (ref 35.8–46.3)
HGB BLD-MCNC: 8.9 G/DL (ref 11.7–15.4)
IMM GRANULOCYTES # BLD AUTO: 0.4 K/UL (ref 0–0.5)
IMM GRANULOCYTES NFR BLD AUTO: 4 % (ref 0–5)
LYMPHOCYTES # BLD: 1.8 K/UL (ref 0.5–4.6)
LYMPHOCYTES NFR BLD: 18 % (ref 13–44)
MCH RBC QN AUTO: 29.1 PG (ref 26.1–32.9)
MCHC RBC AUTO-ENTMCNC: 31 G/DL (ref 31.4–35)
MCV RBC AUTO: 93.8 FL (ref 79.6–97.8)
MONOCYTES # BLD: 0.5 K/UL (ref 0.1–1.3)
MONOCYTES NFR BLD: 5 % (ref 4–12)
NEUTS SEG # BLD: 6.8 K/UL (ref 1.7–8.2)
NEUTS SEG NFR BLD: 67 % (ref 43–78)
NRBC # BLD: 0.06 K/UL (ref 0–0.2)
PLATELET # BLD AUTO: 320 K/UL (ref 150–450)
PMV BLD AUTO: 8.7 FL (ref 9.4–12.3)
POTASSIUM SERPL-SCNC: 3.6 MMOL/L (ref 3.5–5.1)
PROT SERPL-MCNC: 6.4 G/DL (ref 6.3–8.2)
RBC # BLD AUTO: 3.06 M/UL (ref 4.05–5.2)
SODIUM SERPL-SCNC: 138 MMOL/L (ref 136–145)
WBC # BLD AUTO: 10.3 K/UL (ref 4.3–11.1)

## 2020-08-05 PROCEDURE — 74011250637 HC RX REV CODE- 250/637: Performed by: OBSTETRICS & GYNECOLOGY

## 2020-08-05 PROCEDURE — 65270000029 HC RM PRIVATE

## 2020-08-05 PROCEDURE — 93306 TTE W/DOPPLER COMPLETE: CPT

## 2020-08-05 PROCEDURE — 99218 HC RM OBSERVATION: CPT

## 2020-08-05 PROCEDURE — 36415 COLL VENOUS BLD VENIPUNCTURE: CPT

## 2020-08-05 PROCEDURE — 85025 COMPLETE CBC W/AUTO DIFF WBC: CPT

## 2020-08-05 PROCEDURE — 80053 COMPREHEN METABOLIC PANEL: CPT

## 2020-08-05 RX ORDER — NIFEDIPINE 30 MG/1
30 TABLET, EXTENDED RELEASE ORAL 2 TIMES DAILY
Status: DISCONTINUED | OUTPATIENT
Start: 2020-08-05 | End: 2020-08-06 | Stop reason: HOSPADM

## 2020-08-05 RX ADMIN — POTASSIUM CHLORIDE 20 MEQ: 1500 TABLET, EXTENDED RELEASE ORAL at 09:02

## 2020-08-05 RX ADMIN — OXYCODONE HYDROCHLORIDE AND ACETAMINOPHEN 1 TABLET: 7.5; 325 TABLET ORAL at 21:05

## 2020-08-05 RX ADMIN — HYDROCHLOROTHIAZIDE: 25 TABLET ORAL at 18:52

## 2020-08-05 RX ADMIN — METHIMAZOLE 5 MG: 5 TABLET ORAL at 06:08

## 2020-08-05 RX ADMIN — POLYETHYLENE GLYCOL (3350) 17 G: 17 POWDER, FOR SOLUTION ORAL at 09:02

## 2020-08-05 RX ADMIN — FLUTICASONE PROPIONATE 2 SPRAY: 50 SPRAY, METERED NASAL at 09:02

## 2020-08-05 RX ADMIN — VENLAFAXINE HYDROCHLORIDE 37.5 MG: 37.5 CAPSULE, EXTENDED RELEASE ORAL at 09:01

## 2020-08-05 RX ADMIN — ZOLPIDEM TARTRATE 5 MG: 5 TABLET ORAL at 23:25

## 2020-08-05 RX ADMIN — METHIMAZOLE 5 MG: 5 TABLET ORAL at 14:01

## 2020-08-05 RX ADMIN — IBUPROFEN 600 MG: 600 TABLET, FILM COATED ORAL at 01:12

## 2020-08-05 RX ADMIN — METHIMAZOLE 5 MG: 5 TABLET ORAL at 22:09

## 2020-08-05 RX ADMIN — IBUPROFEN 600 MG: 600 TABLET, FILM COATED ORAL at 11:58

## 2020-08-05 RX ADMIN — NIFEDIPINE 30 MG: 30 TABLET, EXTENDED RELEASE ORAL at 22:09

## 2020-08-05 RX ADMIN — Medication 10 ML: at 06:08

## 2020-08-05 RX ADMIN — ZOLPIDEM TARTRATE 5 MG: 5 TABLET ORAL at 01:12

## 2020-08-05 RX ADMIN — Medication 10 ML: at 14:05

## 2020-08-05 RX ADMIN — POTASSIUM CHLORIDE 20 MEQ: 1500 TABLET, EXTENDED RELEASE ORAL at 18:53

## 2020-08-05 NOTE — PROGRESS NOTES
IV was noted to be infiltrated on assessment. Lab orders reviewed and labs attempted to be drawn while replacing IV, this was unsuccessful and phlebotomy was notified and states they will be here shortly to draw blood.

## 2020-08-05 NOTE — PROGRESS NOTES
7116 Patient complains of lower abdominal soreness at incision site, ibuprofen administered at this time. Patient also complains of feeling light headed, shaky, and having blurry vision. Dr. Waldo Osorio notified. 22 [id number]  Patient reports decreased pain since administration of ibuprofen.

## 2020-08-05 NOTE — PROGRESS NOTES
Dr. Grecia Wilder and Dr. Melda Lombard notified of increased BP of 162/86 and recheck of 162/90. No new medication orders at this time, Dr. Melda Lombard states to wait for cardiology to see her. Dr. Melda Lombard notified that patient reports some blurry vision and has clonus and brisk reflexes but that headache has resolved. Dr. Melda Lombard orders CBC and CMP.

## 2020-08-05 NOTE — PROGRESS NOTES
Dr. Nadege Diaz from Cardiology called to unit - he states the echocardiogram is normal and he will be by the unit later to see the patient. Dr. Anitha Chinchilla updated.

## 2020-08-05 NOTE — PROGRESS NOTES
Spoke with Matthieu Sandoval about ECHO. States she will try and have someone here by lunch time. Primary RN notified.

## 2020-08-05 NOTE — PROGRESS NOTES
Patient resting in bed at this time. Still complains about feeling a little lightheaded. Edema is noted to be improving. Patient has been ambulating around room. Denies any other needs at this time.

## 2020-08-05 NOTE — PROGRESS NOTES
Follow-up with patient to have Medicaid application signed. Application then returned to [facility].     BI Contreras  [place]   [phone]

## 2020-08-05 NOTE — PROGRESS NOTES
High Risk Obstetrics Progress Note    Name: Cyndy Chin MRN: [id number]  SSN: [id number]    YOB: 1975  Age: 40 y.o. Sex: female      Subjective:      LOS: 0 days    Estimated Date of Delivery: 20   Gestational Age Today: 32w2d     Patient admitted for hypertension and fluid overload. She has had good diuresis and BP better controlled. States she does have excessive swelling but improved. Objective:     Vitals:  Blood pressure 148/76, pulse 69, temperature 98.8 °F (37.1 °C), resp. rate 20, last menstrual period 2019, SpO2 100 %. Temp (24hrs), Av.7 °F (37.1 °C), Min:98.3 °F (36.8 °C), Max:99 °F (77.8 °C)    Systolic (81AFY), XZW:729 , Min:139 , VWZ:347      Diastolic (05FTK), EA, Min:76, Max:90       Intake and Output:     Date 20 0700 - 20 0659   Shift 9997-9164 5582-2867 2459-1132 24 Hour Total   INTAKE   Shift Total       OUTPUT   Urine 1100   1100   Shift Total 1100   1100   Weight (kg)           Physical Exam:  Patient without distress.   Abdomen: soft, nontender       Labs:   Recent Results (from the past 36 hour(s))   CBC W/O DIFF    Collection Time: 20  9:02 AM   Result Value Ref Range    WBC 16.9 (H) 4.3 - 11.1 K/uL    RBC 3.01 (L) 4.05 - 5.2 M/uL    HGB 8.7 (L) 11.7 - 15.4 g/dL    HCT 27.3 (L) 35.8 - 46.3 %    MCV 90.7 79.6 - 97.8 FL    MCH 28.9 26.1 - 32.9 PG    MCHC 31.9 31.4 - 35.0 g/dL    RDW 19.3 (H) 11.9 - 14.6 %    PLATELET 278 058 - 885 K/uL    MPV 8.8 (L) 9.4 - 12.3 FL    ABSOLUTE NRBC 0.15 0.0 - 0.2 K/uL   METABOLIC PANEL, COMPREHENSIVE    Collection Time: 20  9:02 AM   Result Value Ref Range    Sodium 139 136 - 145 mmol/L    Potassium 3.6 3.5 - 5.1 mmol/L    Chloride 105 98 - 107 mmol/L    CO2 27 21 - 32 mmol/L    Anion gap 7 7 - 16 mmol/L    Glucose 103 (H) 65 - 100 mg/dL    BUN 9 6 - 23 MG/DL    Creatinine 0.92 0.6 - 1.0 MG/DL    GFR est AA >60 >60 ml/min/1.73m2    GFR est non-AA >60 >60 ml/min/1.73m2    Calcium 9.2 8.3 - 10.4 MG/DL    Bilirubin, total 0.5 0.2 - 1.1 MG/DL    ALT (SGPT) 39 12 - 65 U/L    AST (SGOT) 28 15 - 37 U/L    Alk. phosphatase 81 50 - 130 U/L    Protein, total 7.0 6.3 - 8.2 g/dL    Albumin 2.7 (L) 3.5 - 5.0 g/dL    Globulin 4.3 (H) 2.3 - 3.5 g/dL    A-G Ratio 0.6 (L) 1.2 - 3.5     CBC WITH AUTOMATED DIFF    Collection Time: 08/05/20 10:21 AM   Result Value Ref Range    WBC 10.3 4.3 - 11.1 K/uL    RBC 3.06 (L) 4.05 - 5.2 M/uL    HGB 8.9 (L) 11.7 - 15.4 g/dL    HCT 28.7 (L) 35.8 - 46.3 %    MCV 93.8 79.6 - 97.8 FL    MCH 29.1 26.1 - 32.9 PG    MCHC 31.0 (L) 31.4 - 35.0 g/dL    RDW 18.8 (H) 11.9 - 14.6 %    PLATELET 295 061 - 017 K/uL    MPV 8.7 (L) 9.4 - 12.3 FL    ABSOLUTE NRBC 0.06 0.0 - 0.2 K/uL    DF AUTOMATED      NEUTROPHILS 67 43 - 78 %    LYMPHOCYTES 18 13 - 44 %    MONOCYTES 5 4.0 - 12.0 %    EOSINOPHILS 6 0.5 - 7.8 %    BASOPHILS 0 0.0 - 2.0 %    IMMATURE GRANULOCYTES 4 0.0 - 5.0 %    ABS. NEUTROPHILS 6.8 1.7 - 8.2 K/UL    ABS. LYMPHOCYTES 1.8 0.5 - 4.6 K/UL    ABS. MONOCYTES 0.5 0.1 - 1.3 K/UL    ABS. EOSINOPHILS 0.7 0.0 - 0.8 K/UL    ABS. BASOPHILS 0.0 0.0 - 0.2 K/UL    ABS. IMM. GRANS. 0.4 0.0 - 0.5 K/UL   METABOLIC PANEL, COMPREHENSIVE    Collection Time: 08/05/20 10:21 AM   Result Value Ref Range    Sodium 138 136 - 145 mmol/L    Potassium 3.6 3.5 - 5.1 mmol/L    Chloride 105 98 - 107 mmol/L    CO2 28 21 - 32 mmol/L    Anion gap 5 (L) 7 - 16 mmol/L    Glucose 106 (H) 65 - 100 mg/dL    BUN 8 6 - 23 MG/DL    Creatinine 0.97 0.6 - 1.0 MG/DL    GFR est AA >60 >60 ml/min/1.73m2    GFR est non-AA >60 >60 ml/min/1.73m2    Calcium 8.7 8.3 - 10.4 MG/DL    Bilirubin, total 0.4 0.2 - 1.1 MG/DL    ALT (SGPT) 27 12 - 65 U/L    AST (SGOT) 21 15 - 37 U/L    Alk.  phosphatase 66 50 - 130 U/L    Protein, total 6.4 6.3 - 8.2 g/dL    Albumin 2.4 (L) 3.5 - 5.0 g/dL    Globulin 4.0 (H) 2.3 - 3.5 g/dL    A-G Ratio 0.6 (L) 1.2 - 3.5           ECHO report appears normal      Assessment and Plan:      Principal Problem: Hypertension in pregnancy, preeclampsia, severe, delivered/postpartum (8/3/2020)       With normal Echo and BP more stable with good diuresis if remains stable possible discharge home tomorrow

## 2020-08-06 VITALS
RESPIRATION RATE: 18 BRPM | HEART RATE: 81 BPM | SYSTOLIC BLOOD PRESSURE: 141 MMHG | TEMPERATURE: 98.2 F | DIASTOLIC BLOOD PRESSURE: 87 MMHG | OXYGEN SATURATION: 98 %

## 2020-08-06 PROCEDURE — 99218 HC RM OBSERVATION: CPT

## 2020-08-06 PROCEDURE — 74011250637 HC RX REV CODE- 250/637: Performed by: OBSTETRICS & GYNECOLOGY

## 2020-08-06 RX ORDER — NIFEDIPINE 30 MG/1
30 TABLET, EXTENDED RELEASE ORAL 2 TIMES DAILY
Qty: 60 TAB | Refills: 0 | Status: SHIPPED | OUTPATIENT
Start: 2020-08-06 | End: 2020-08-20

## 2020-08-06 RX ORDER — FLUTICASONE PROPIONATE 50 MCG
SPRAY, SUSPENSION (ML) NASAL
Qty: 1 BOTTLE | Refills: 0 | Status: SHIPPED
Start: 2020-08-06 | End: 2021-12-07 | Stop reason: ALTCHOICE

## 2020-08-06 RX ORDER — CAPTOPRIL AND HYDROCHLOROTHIAZIDE 25; 25 MG/1; MG/1
1 TABLET ORAL DAILY
Qty: 30 TAB | Refills: 0 | Status: SHIPPED | OUTPATIENT
Start: 2020-08-06 | End: 2020-08-11

## 2020-08-06 RX ADMIN — VENLAFAXINE HYDROCHLORIDE 37.5 MG: 37.5 CAPSULE, EXTENDED RELEASE ORAL at 08:33

## 2020-08-06 RX ADMIN — NIFEDIPINE 30 MG: 30 TABLET, EXTENDED RELEASE ORAL at 08:33

## 2020-08-06 RX ADMIN — METHIMAZOLE 5 MG: 5 TABLET ORAL at 05:59

## 2020-08-06 RX ADMIN — POTASSIUM CHLORIDE 20 MEQ: 1500 TABLET, EXTENDED RELEASE ORAL at 08:33

## 2020-08-06 NOTE — DISCHARGE SUMMARY
Antepartum Discharge Summary     Name: Elli Walton MRN: [id number]  SSN: [id number]    YOB: 1975  Age: 40 y.o. Sex: female      Allergies: Patient has no known allergies. Admit Date: 8/3/2020    Discharge Date: 8/6/2020     Admitting Physician: Dafne Verdin MD     Attending Physician:  Maurice Harper, *     * Admission Diagnoses: postpartum edema and hypertension  * Discharge Diagnoses:   Hospital Problems as of 8/6/2020 Date Reviewed: 7/29/2020          Codes Class Noted - Resolved POA    * (Principal) Hypertension in pregnancy, preeclampsia, severe, delivered/postpartum ICD-10-CM: O14.15  ICD-9-CM: 642.52  8/3/2020 - Present Unknown             Lab Results   Component Value Date/Time    ABO/Rh(D) O POSITIVE 07/29/2020 09:55 AM    Rubella, External Immune 02/18/2020    ABO,Rh O positive 02/18/2020    There is no immunization history for the selected administration types on file for this patient. * Discharge Condition: stable    * Procedures: iv medication, CT scan, EKG, xray, MFM consult, cardiology consult and echo. Oral medications. [facility] Course:    - pt was readmitted with complaint of difficulty breathing. She received lasix and blood pressure meds. All imagining studies were negative. She was deemed stable for discharge home on oral meds on hd #4     * Disposition: Home    Discharge Medications:   Current Discharge Medication List      START taking these medications    Details   captopril-hydroCHLOROthiazide (CAPOZIDE) 25-25 mg per tablet Take 1 Tab by mouth daily. Qty: 30 Tab, Refills: 0      fluticasone propionate (FLONASE) 50 mcg/actuation nasal spray Take as directed  Qty: 1 Bottle, Refills: 0      NIFEdipine ER (PROCARDIA XL) 30 mg ER tablet Take 1 Tab by mouth two (2) times a day.  Indications: high blood pressure  Qty: 60 Tab, Refills: 0         CONTINUE these medications which have NOT CHANGED    Details   ibuprofen (MOTRIN) 800 mg tablet Take 1 Tab by mouth every eight (8) hours as needed for Pain. Qty: 30 Tab, Refills: 0      polyethylene glycol (MIRALAX) 17 gram packet Take 1 Packet by mouth daily. Qty: 30 Packet, Refills: 0      venlafaxine-SR (EFFEXOR-XR) 37.5 mg capsule Take 1 Cap by mouth daily (with breakfast). Indications: major depressive disorder  Qty: 30 Cap, Refills: 1      zolpidem (Ambien) 5 mg tablet Take 1 Tab by mouth nightly as needed for Sleep. Max Daily Amount: 5 mg. Qty: 30 Tab, Refills: 0    Associated Diagnoses: Antepartum placental abruption      methIMAzole (TAPAZOLE) 5 mg tablet Take 1 Tab by mouth every eight (8) hours. Qty: 90 Tab, Refills: 3             * Follow-up Care/Patient Instructions:   Activity: No sex for 6 weeks  Diet: Regular Diet  Wound Care: As directed    Follow-up Information     Follow up With Specialties Details Why Contact Info    Adama Iverson MD [facility]   [address] [place] [address] [address] [facility]  [phone]

## 2020-08-06 NOTE — CONSULTS
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 female     No prior cardiac history. Patient with recent admission and discharge for placental abruption/fetal demise and discharged on 8/1/2020; re-presentation on 8/3/2020 to the ER with increased shortness of breath/orthopnea/edema. Appears patient did receive blood transfusion postpartum. Per records, also issues with labile blood pressures; max on records noted at 189/92. Currently has been diuresed and is about 5 L net negative. Echocardiogram obtained today unremarkable with preserved EF and normal diastolic function; no significant valvular lesions. Currently feels back to baseline. On room air. At baseline, prior to current presentation, with no exertional intolerance. Patient Active Problem List   Diagnosis Code    Multigravida of advanced maternal age in third trimester O09.523    H/O herpes simplex infection Z86.19    Hereditary disease in family possibly affecting fetus O32. 2XX0    Thyroid dysfunction in pregnancy, antepartum O99.280, E07.9    Obesity affecting pregnancy in third trimester O99.213    Uterine fibroid in pregnancy O34.10, D25.9    LGSIL on Pap smear of cervix R87.612    Back pain affecting pregnancy in third trimester O99.89, M54.9    Diet controlled gestational diabetes mellitus (GDM) in third trimester O24.410    Elevated blood pressure affecting pregnancy, antepartum O16.9    Poor fetal growth, affecting management of mother, antepartum condition or complication X03.6786    Antepartum placental abruption O45.90    Fetal demise in ervin pregnancy greater than 22 weeks gestation, antepartum O38. 4XX0    Hypertension in pregnancy, preeclampsia, severe, delivered/postpartum O14.15       Past Medical History:   Diagnosis Date    Anemia     has never had blood transfusion    Anxiety     Chlamydia     Depression     Genital herpes 2007    no issues since    Hypertension in pregnancy, preeclampsia, severe, delivered/postpartum 8/3/2020    Hypothyroidism     states \"borderline\"    LGSIL on Pap smear of cervix 3/19/2020      Past Surgical History:   Procedure Laterality Date    HX WISDOM TEETH EXTRACTION       No Known Allergies   Family History   Problem Relation Age of Onset    Diabetes Mother     Hypertension Mother     Cancer Maternal Grandmother         Uterine cancer?  Cancer Paternal Grandmother         Lung Cancer- smoker        Current Facility-Administered Medications   Medication Dose Route Frequency    sodium chloride (OCEAN) 0.65 % nasal squeeze bottle 2 Spray  2 Spray Both Nostrils Q2H PRN    fluticasone propionate (FLONASE) 50 mcg/actuation nasal spray 2 Spray  2 Spray Both Nostrils DAILY    loratadine (CLARITIN) tablet 10 mg  10 mg Oral DAILY PRN    sodium chloride (NS) flush 5-40 mL  5-40 mL IntraVENous Q8H    sodium chloride (NS) flush 5-40 mL  5-40 mL IntraVENous PRN    oxyCODONE-acetaminophen (PERCOCET 7.5) 7.5-325 mg per tablet 1 Tab  1 Tab Oral Q4H PRN    methIMAzole (TAPAZOLE) tablet 5 mg  5 mg Oral Q8H    ibuprofen (MOTRIN) tablet 600 mg  600 mg Oral Q6H PRN    venlafaxine-SR (EFFEXOR-XR) capsule 37.5 mg  37.5 mg Oral DAILY WITH BREAKFAST    zolpidem (AMBIEN) tablet 5 mg  5 mg Oral QHS PRN    potassium chloride (K-DUR, KLOR-CON) SR tablet 20 mEq  20 mEq Oral BID    captopril/hydroCHLOROthiazide (CAPOZIDE) 25/25 mg   Oral DAILY    sodium chloride (NS) flush 5-40 mL  5-40 mL IntraVENous Q8H    sodium chloride (NS) flush 5-40 mL  5-40 mL IntraVENous PRN    lactated Ringers infusion  50 mL/hr IntraVENous CONTINUOUS       Review of Systems   Constitution: Positive for weight gain. Negative for chills, decreased appetite, fever, malaise/fatigue and weight loss. HENT: Negative for hearing loss and sore throat.     Eyes: Negative for blurred vision and double vision. Cardiovascular: Positive for dyspnea on exertion, leg swelling, orthopnea and paroxysmal nocturnal dyspnea. Negative for chest pain, palpitations and syncope. Respiratory: Negative for cough and shortness of breath. Endocrine: Negative for cold intolerance and heat intolerance. Hematologic/Lymphatic: Negative for bleeding problem. Musculoskeletal: Negative for falls, muscle cramps, muscle weakness and myalgias. Gastrointestinal: Negative for abdominal pain, hematemesis, hematochezia and melena. Neurological: Negative for dizziness and headaches. Psychiatric/Behavioral: Negative for altered mental status. The patient is not nervous/anxious. Physical Exam  Vitals:    08/05/20 1151 08/05/20 1409 08/05/20 1852 08/05/20 1959   BP: 139/78 148/76 143/76 148/80   Pulse: 70 69 79 73   Resp: 20 20  18   Temp: 99 °F (37.2 °C) 98.8 °F (37.1 °C)  99 °F (37.2 °C)   SpO2:    100%       Physical Exam:  General: Well Developed, Well Nourished, No Acute Distress  HEENT: pupils equal and round, no abnormalities noted  Neck: supple, no JVD, no carotid bruits  Heart: S1S2 with RRR without murmurs or gallops  Lungs: Clear throughout auscultation bilaterally without adventitious sounds  Abd: soft, nontender, nondistended, with good bowel sounds  Ext: warm, no edema, calves supple/nontender, pulses 2+ bilaterally  Skin: warm and dry  Psychiatric: Normal mood and affect  Neurologic: Alert and oriented X 3      Labs:   Recent Labs     08/05/20  1021 08/04/20  0902 08/03/20  2252    139  --    K 3.6 3.6  --    BUN 8 9  --    CREA 0.97 0.92  --    * 103*  --    WBC 10.3 16.9*  --    HGB 8.9* 8.7*  --    HCT 28.7* 27.3*  --     310  --    INR  --   --  1.0        Assessment/Plan:     Assessment:      Principal Problem:    Hypertension in pregnancy, preeclampsia, severe, delivered/postpartum (8/3/2020)      Exam currently euvolemic.   Echocardiogram unremarkable with preserved EF/normal diastolic function. Good response to diuretics. Volume overload on presentation likely multifactorial with peripartum fluid shifts/recent blood transfusion/peripartum hypertension. Improved blood pressure control on current regimen and defer titration to primary team.  Reassess long-term need as an outpatient  No further cardiac work-up needed at this time. Thank you very much for this referral. We appreciate the opportunity to participate in this patient's care. Please call if any questions.     Dl Chou MD

## 2020-08-06 NOTE — PROGRESS NOTES
Cardiologist in to see pt, after seeing cardiologist pt is very teary eyed and states that her incision is starting to hurt, pt given pain med, see MAR.

## 2020-08-06 NOTE — PROGRESS NOTES
Spoke with Dr Kenisha Irizarry on the phone, md notified that she added procardia 30 XL to pt's med and reviewed note from cardiologist.

## 2020-08-06 NOTE — PROGRESS NOTES
Post-Partum Progress Note    Patient doing well  without significant complaints. Bowel movement yesterday. Her swelling is better. Vitals:    Visit Vitals  /87   Pulse 81   Temp 98.2 °F (36.8 °C)   Resp 18   LMP 12/16/2019   SpO2 98%       Vital signs stable, afebrile. Exam:  Patient without distress. Heart rrr  Lungs cta b&s               Abdomen soft, fundus firm at level of umbilicus, nontender. Incision clean, dry and intact. Lower extremities are negative for swelling, cords or tenderness. Lab Results   Component Value Date/Time    ABO Group O 02/18/2020 01:42 PM    Rh (D) Positive 02/18/2020 01:42 PM    ABO/Rh(D) Akira Iniguez POSITIVE 07/29/2020 09:55 AM        Lab Results   Component Value Date/Time    ABO/Rh(D) Akira Iniguez POSITIVE 07/29/2020 09:55 AM    Antibody screen NEG 07/29/2020 09:55 AM    Antibody screen, External Negative 02/18/2020    Rubella, External Immune 02/18/2020    ABO,Rh O positive 02/18/2020     Lab Results   Component Value Date/Time    HGB 8.9 (L) 08/05/2020 10:21 AM    Hgb, External 11.8 02/18/2020         Assessment and Plan:  Pp fluid retention. Will send home on procardia and captopril per m recommendation. rtc 1 week for blood pressure check. effexor for depression.

## 2020-08-06 NOTE — PROGRESS NOTES
MFM    Appreciate cardiology recommendations and care. With continued labile BP into severe range today, will add procardia 30mg XL BID at this time. As patient continues to recover, will decrease to qday. First dose tonight.      Jani Ramesh MD.

## 2020-08-06 NOTE — DISCHARGE INSTRUCTIONS
Patient Education        Learning About High Blood Pressure  What is high blood pressure? Blood pressure is a measure of how hard the blood pushes against the walls of your arteries. It's normal for blood pressure to go up and down throughout the day. But if it stays up, you have high blood pressure. Another name for high blood pressure is hypertension. Two numbers tell you your blood pressure. The first number is the systolic pressure (top number). It shows how hard the blood pushes when your heart is pumping. The second number is the diastolic pressure (bottom number). It shows how hard the blood pushes between heartbeats, when your heart is relaxed and filling with blood. Your doctor will give you a goal for your blood pressure based on your health and your age. High blood pressure (hypertension) means that the top number stays high, or the bottom number stays high, or both. High blood pressure increases the risk of stroke, heart attack, and other problems. What happens when you have high blood pressure? · Blood flows through your arteries with too much force. Over time, this damages the walls of your arteries. But you can't feel it. High blood pressure usually doesn't cause symptoms. · Fat and calcium start to build up in your arteries. This buildup is called plaque. Plaque makes your arteries narrower and stiffer. Blood can't flow through them as easily. · This lack of good blood flow starts to damage some of the organs in your body. This can lead to problems such as coronary artery disease and heart attack, heart failure, stroke, kidney failure, and eye damage. How can you prevent high blood pressure? · Stay at a healthy weight. · Try to limit how much sodium you eat to less than 2,300 milligrams (mg) a day. If you limit your sodium to 1,500 mg a day, you can lower your blood pressure even more. ? Buy foods that are labeled \"unsalted,\" \"sodium-free,\" or \"low-sodium. \" Foods labeled \"reduced-sodium\" and \"light sodium\" may still have too much sodium. ? Flavor your food with garlic, lemon juice, onion, vinegar, herbs, and spices instead of salt. Do not use soy sauce, steak sauce, onion salt, garlic salt, mustard, or ketchup on your food. ? Use less salt (or none) when recipes call for it. You can often use half the salt a recipe calls for without losing flavor. · Be physically active. Get at least 30 minutes of exercise on most days of the week. Walking is a good choice. You also may want to do other activities, such as running, swimming, cycling, or playing tennis or team sports. · Limit alcohol to 2 drinks a day for men and 1 drink a day for women. · Eat plenty of fruits, vegetables, and low-fat dairy products. Eat less saturated and total fats. How is high blood pressure treated? · Your doctor will suggest making lifestyle changes to help your heart. For example, your doctor may ask you to eat healthy foods, quit smoking, lose extra weight, and be more active. · If lifestyle changes don't help enough, your doctor may recommend that you take medicine. · When blood pressure is very high, medicines are needed to lower it. Follow-up care is a key part of your treatment and safety. Be sure to make and go to all appointments, and call your doctor if you are having problems. It's also a good idea to know your test results and keep a list of the medicines you take. Where can you learn more? Go to http://margaret-obie.info/  Enter P501 in the search box to learn more about \"Learning About High Blood Pressure. \"  Current as of: December 16, 2019               Content Version: 12.5  © 1624-8945 [employer]. Care instructions adapted under license by [employer] (which disclaims liability or warranty for this information).  If you have questions about a medical condition or this instruction, always ask your healthcare professional. [employer] disclaims any warranty or liability for your use of this information.

## 2020-08-20 PROBLEM — O99.213 OBESITY AFFECTING PREGNANCY IN THIRD TRIMESTER: Status: RESOLVED | Noted: 2020-03-09 | Resolved: 2020-08-20

## 2020-08-20 PROBLEM — O16.9 ELEVATED BLOOD PRESSURE AFFECTING PREGNANCY, ANTEPARTUM: Status: RESOLVED | Noted: 2020-07-20 | Resolved: 2020-08-20

## 2020-08-20 PROBLEM — D25.9 UTERINE FIBROID IN PREGNANCY: Status: RESOLVED | Noted: 2020-03-09 | Resolved: 2020-08-20

## 2020-08-20 PROBLEM — M54.9 BACK PAIN AFFECTING PREGNANCY IN THIRD TRIMESTER: Status: RESOLVED | Noted: 2020-06-10 | Resolved: 2020-08-20

## 2020-08-20 PROBLEM — O34.10 UTERINE FIBROID IN PREGNANCY: Status: RESOLVED | Noted: 2020-03-09 | Resolved: 2020-08-20

## 2020-08-20 PROBLEM — O99.280 THYROID DYSFUNCTION IN PREGNANCY, ANTEPARTUM: Status: RESOLVED | Noted: 2020-02-18 | Resolved: 2020-08-20

## 2020-08-20 PROBLEM — O36.5990 POOR FETAL GROWTH, AFFECTING MANAGEMENT OF MOTHER, ANTEPARTUM CONDITION OR COMPLICATION: Status: RESOLVED | Noted: 2020-07-23 | Resolved: 2020-08-20

## 2020-08-20 PROBLEM — O36.4XX0 FETAL DEMISE IN SINGLETON PREGNANCY GREATER THAN 22 WEEKS GESTATION, ANTEPARTUM: Status: RESOLVED | Noted: 2020-07-29 | Resolved: 2020-08-20

## 2020-08-20 PROBLEM — O99.891 BACK PAIN AFFECTING PREGNANCY IN THIRD TRIMESTER: Status: RESOLVED | Noted: 2020-06-10 | Resolved: 2020-08-20

## 2020-08-20 PROBLEM — O35.2XX0 HEREDITARY DISEASE IN FAMILY POSSIBLY AFFECTING FETUS: Status: RESOLVED | Noted: 2020-02-18 | Resolved: 2020-08-20

## 2020-08-20 PROBLEM — O09.523 MULTIGRAVIDA OF ADVANCED MATERNAL AGE IN THIRD TRIMESTER: Status: RESOLVED | Noted: 2020-02-18 | Resolved: 2020-08-20

## 2020-08-20 PROBLEM — E07.9 THYROID DYSFUNCTION IN PREGNANCY, ANTEPARTUM: Status: RESOLVED | Noted: 2020-02-18 | Resolved: 2020-08-20

## 2020-08-20 PROBLEM — O24.410 DIET CONTROLLED GESTATIONAL DIABETES MELLITUS (GDM) IN THIRD TRIMESTER: Status: RESOLVED | Noted: 2020-07-06 | Resolved: 2020-08-20

## 2020-09-16 ENCOUNTER — TELEPHONE (OUTPATIENT)
Dept: CASE MANAGEMENT | Age: 45
End: 2020-09-16

## 2020-09-16 NOTE — TELEPHONE ENCOUNTER
Referral received from [facility] to contact patient regarding bereavement support. IUFD on 20. Phone call to patient at [phone]. Introduction made as ; patient agreeable to continue conversation. Patient was allowed the opportunity to share how she's been doing since experiencing her loss at the end of July. Psycho-education provided by  on reactions to this grief and loss. Patient verbalized concern about having to return to work soon as her FMLA/Short-term disability ends on 20 (which also happens to be her original due date). She would like to extend her FMLA.  [facility] to follow-up with patient about possible FMLA extension. Emotional support provided to patient. Education provided on multiple bereavement resources available in the [facility]. Patient agreeable for  to e-mail her information on [place] [address] as well as 2 local therapy practices that specialize in  loss and accept her insurance ([facility]). E-mail: [email or web address]. Patient states that she's undecided if she wants to pursue therapy at this time. She states that she's \"already tired and that's just going to make me even more tired. \"  Patient confirms that she has an appointment with [address] Nurse Practitioner Aury Rehman) on Monday () to discuss medication management. Patient agreeable for  to call back next week to check-in. Additionally, patient has this 's contact information for any additional needs/questions.       BI Rojas  [address]   [phone]

## 2020-09-21 ENCOUNTER — TELEPHONE (OUTPATIENT)
Dept: CASE MANAGEMENT | Age: 45
End: 2020-09-21

## 2020-09-21 NOTE — TELEPHONE ENCOUNTER
Phone call to patient at [phone] to check-in. No answer; message left.     IB Marvin  [address] [place] [address]   [phone]

## 2020-09-24 ENCOUNTER — TELEPHONE (OUTPATIENT)
Dept: CASE MANAGEMENT | Age: 45
End: 2020-09-24

## 2020-09-24 NOTE — TELEPHONE ENCOUNTER
Phone call to patient at [phone] to check-in. Per patient, \"I'm a lot better than I was - I still have bad moments. \"  Patient states that she still hasn't signed up for counseling although she feels this would be beneficial.   Additionally, patient reports that there was a group she had planned on attending Tuesday, but she didn't go.  encouraged patient to make initial contact as soon as possible to begin counseling. Patient confirms that she received my e-mail with counseling recommendations as well as information on [facility] Aguil[facility]. Patient states that she's still not ready to return to work. She has filed for an extension for [employer] and will  return to work on October 7th if the extension if approved. She is concerned about how to handle situations at work if people ask about baby/pregnancy. Patient has seen [address]  NP who recommended that she stay on the Wellbutrin. She was also prescribed Klonopin, but she states, \"I haven't used it yet because that kind of pill puts me to sleep. \"  Patient feels that the Wellbutrin has helped a lot. Per patient, \"It helps me not go into a dark place and stay there. \"     will resend e-mail with counseling resources to patient.  encouraged patient to contact counselor as soon as possible.   Patient has this 's contact information for any needs/questions.          KIRBY Long-CP  [address]   [phone]

## 2021-12-22 PROCEDURE — 88305 TISSUE EXAM BY PATHOLOGIST: CPT

## 2021-12-23 ENCOUNTER — HOSPITAL ENCOUNTER (OUTPATIENT)
Dept: LAB | Age: 46
Discharge: HOME | End: 2021-12-23

## 2022-03-18 PROBLEM — O45.90 ANTEPARTUM PLACENTAL ABRUPTION: Status: ACTIVE | Noted: 2020-07-29

## 2022-03-19 PROBLEM — R87.612 LGSIL ON PAP SMEAR OF CERVIX: Status: ACTIVE | Noted: 2020-03-19

## 2022-03-19 PROBLEM — Z86.19 H/O HERPES SIMPLEX INFECTION: Status: ACTIVE | Noted: 2020-02-18

## 2022-11-02 ENCOUNTER — OFFICE VISIT (OUTPATIENT)
Dept: GYNECOLOGY | Age: 47
End: 2022-11-02
Payer: COMMERCIAL

## 2022-11-02 VITALS
BODY MASS INDEX: 34.49 KG/M2 | WEIGHT: 207 LBS | SYSTOLIC BLOOD PRESSURE: 126 MMHG | DIASTOLIC BLOOD PRESSURE: 84 MMHG | HEIGHT: 65 IN

## 2022-11-02 DIAGNOSIS — R10.2 PELVIC PAIN: ICD-10-CM

## 2022-11-02 DIAGNOSIS — N89.8 VAGINAL ODOR: ICD-10-CM

## 2022-11-02 DIAGNOSIS — R87.612 LGSIL ON PAP SMEAR OF CERVIX: ICD-10-CM

## 2022-11-02 DIAGNOSIS — N92.6 IRREGULAR PERIODS: Primary | ICD-10-CM

## 2022-11-02 LAB
HCG, PREGNANCY, URINE, POC: NEGATIVE
VALID INTERNAL CONTROL, POC: YES

## 2022-11-02 PROCEDURE — 76830 TRANSVAGINAL US NON-OB: CPT | Performed by: OBSTETRICS & GYNECOLOGY

## 2022-11-02 PROCEDURE — 99214 OFFICE O/P EST MOD 30 MIN: CPT | Performed by: OBSTETRICS & GYNECOLOGY

## 2022-11-02 PROCEDURE — 81025 URINE PREGNANCY TEST: CPT | Performed by: OBSTETRICS & GYNECOLOGY

## 2022-11-02 NOTE — PROGRESS NOTES
HPI  Juan Chen is a 55 y.o. female seen for pelvic cramping x 1 month. She also skipped her period this past month. She has not been using any birth control. She did a HPT that was negative. She has chronic BV and is having some discharge today. Past Medical History, Past Surgical History, Family history, Social History, and Medications were all reviewed with the patient today and updated as necessary. Current Outpatient Medications   Medication Sig    amphetamine-dextroamphetamine (ADDERALL XR) 5 MG extended release capsule Take 5 mg by mouth.    etonogestrel-ethinyl estradiol (NUVARING) 0.12-0.015 MG/24HR vaginal ring Insert nuvaring in the vagina and keep it in place for 3 weeks (21 days). Remove the nurvaring for one one (7 days). (Patient not taking: Reported on 2022)    meloxicam (MOBIC) 15 MG tablet Take 15 mg by mouth daily (Patient not taking: Reported on 2022)    zolpidem (AMBIEN) 5 MG tablet Take 5 mg by mouth. (Patient not taking: Reported on 2022)     No current facility-administered medications for this visit. Allergies   Allergen Reactions    Other Rash     Sardines     Past Medical History:   Diagnosis Date    Anemia     has never had blood transfusion    Anxiety     Chlamydia     Depression     Genital herpes     no issues since    Hypertension in pregnancy, preeclampsia, severe, delivered/postpartum 8/3/2020    Hypothyroidism     states \"borderline\"    LGSIL on Pap smear of cervix 3/19/2020     Past Surgical History:   Procedure Laterality Date     SECTION      2020    COLPOSCOPY      21    WISDOM TOOTH EXTRACTION       Family History   Problem Relation Age of Onset    Diabetes Mother     Hypertension Mother     Cancer Maternal Grandmother         Uterine cancer?     Cancer Paternal Grandmother         Lung Cancer- smoker      Social History     Tobacco Use    Smoking status: Never    Smokeless tobacco: Never   Substance Use Topics    Alcohol use: Yes     Comment: occ       Social History     Substance and Sexual Activity   Sexual Activity Yes    Birth control/protection: None     OB History    Para Term  AB Living   1 1 0 0 0 1   SAB IAB Ectopic Molar Multiple Live Births   0 0 0 0 0 0      # Outcome Date GA Lbr Jordon/2nd Weight Sex Delivery Anes PTL Lv   1 Para               Obstetric Comments   NVD   1  Stillborn       Health Maintenance  Mammogram:   Colonoscopy:   Bone Density:    ROS:    Review of Systems  General: Not Present- Chills, Fever, Fatigue, Insomnia, Hot flashes/Night sweats, Weight gain  Skin: Not Present- Bruising, Change in Wart/Mole, Excessive Sweating, Itching, Nail Changes, New Lesions, Rash, Skin Color Changes and Ulcer. HEENT: Not Present- Headache, Blurred Vision, Double Vision, Glaucoma, Visual Disturbances, Hearing Loss, Ringing in the Ears, Vertigo, Nose Bleed, Bleeding Gums, Hoarseness and Sore Throat. Neck: Not Present- Neck Pain and Neck Swelling. Respiratory: Not Present- Cough, Difficulty Breathing and Difficulty Breathing on Exertion. Breast: Not Present- Breast Mass, Breast Pain, Breast Swelling, Nipple Discharge, Nipple Pain, Recent Breast Size Changes and Skin Changes. Cardiovascular: Not Present- Abnormal Blood Pressure, Chest Pain, Edema, Fainting / Blacking Out, Palpitations, Shortness of Breath and Swelling of Extremities. Gastrointestinal: Not Present- Abdominal Pain, Abdominal Swelling, Bloating, Change in Bowel Habits, Constipation, Diarrhea, Difficulty Swallowing, Gets full quickly at meals, Nausea, Rectal Bleeding and Vomiting. Female Genitourinary: Not Present- Dysmenorrhea, Dyspareunia, Decreased libido, Excessive Menstrual Bleeding, Menstrual Irregularities, Pelvic Pain, Urinary Complaints, Vaginal Discharge, Vaginal itching/burning, Vaginal odor  Musculoskeletal: Not Present- Joint Pain and Muscle Pain.   Neurological: Not Present- Dizziness, Fainting, Headaches and Seizures. Psychiatric: Not Present- Anxiety, Depression, Mood changes and Panic Attacks. Endocrine: Not Present- Appetite Changes, Cold Intolerance, Excessive Thirst, Excessive Urination and Heat Intolerance. Hematology: Not Present- Abnormal Bleeding, Easy Bruising and Enlarged Lymph Nodes. PHYSICAL EXAM:    /84   Ht 5' 5\" (1.651 m)   Wt 207 lb (93.9 kg)   LMP 09/10/2022   BMI 34.45 kg/m²           Medical problems and test results were reviewed with the patient today. ASSESSMENT and PLAN    1. Irregular periods  -     US NON OB TRANSVAGINAL  -     AMB POC URINE PREGNANCY TEST, VISUAL COLOR COMPARISON  2. Vaginal odor  -     Nuswab Vaginitis Plus (VG+)  3. Pelvic pain  -     Culture, Urine  4. LGSIL on Pap smear of cervix  -     PAP LB, Reflex HPV ASCUS ([id number])       Comment   [id number]----irregular periods   HISTORY: Periods have always been irregular but skipped last months period. She has cramping   COMPARISON: None available   ---------------------------------------------------------------------------------------------------------------   Enlarged uterus = 8wks with focal adenomyosis   Endometrium = 6.8mm and appears normal   Rt ovary is normal with simple ovulatory follicle noted. Lt ovary is normal.   No free fluid noted in the pelvis. Date: 11/02/2022 Perf. Physician: Dr. Sarah Howard, MD [address] done in my office and discussed with patient. Nuswab. Urine culture. UPT. Since this is the first month she has skipped  period will observe       Time:  I spent  30 minutes in preparing to see patient (including chart review and preparation), obtaining and/or reviewing additional medical history, performing a physical exam and evaluation, documenting clinical information in the electronic health record, independently interpreting results, communicating results to patient, family or caregiver, and/or coordinating care.           No follow-ups on file.        Gabrielle Pagan MD

## 2022-11-05 LAB
A VAGINAE DNA VAG QL NAA+PROBE: ABNORMAL SCORE
BACTERIA SPEC CULT: NORMAL
BACTERIA SPEC CULT: NORMAL
BVAB2 DNA VAG QL NAA+PROBE: ABNORMAL SCORE
C ALBICANS DNA VAG QL NAA+PROBE: NEGATIVE
C GLABRATA DNA VAG QL NAA+PROBE: NEGATIVE
C TRACH RRNA SPEC QL NAA+PROBE: POSITIVE
MEGA1 DNA VAG QL NAA+PROBE: ABNORMAL SCORE
N GONORRHOEA RRNA SPEC QL NAA+PROBE: NEGATIVE
SERVICE CMNT-IMP: NORMAL
SPECIMEN SOURCE: ABNORMAL
T VAGINALIS RRNA SPEC QL NAA+PROBE: NEGATIVE

## 2022-11-07 ENCOUNTER — TELEPHONE (OUTPATIENT)
Dept: GYNECOLOGY | Age: 47
End: 2022-11-07

## 2022-11-07 DIAGNOSIS — A74.9 CHLAMYDIA: Primary | ICD-10-CM

## 2022-11-07 LAB
CYTOLOGIST CVX/VAG CYTO: NORMAL
CYTOLOGY CVX/VAG DOC THIN PREP: NORMAL
HPV REFLEX: NORMAL
Lab: NORMAL
PATH REPORT.FINAL DX SPEC: NORMAL
STAT OF ADQ CVX/VAG CYTO-IMP: NORMAL

## 2022-11-08 DIAGNOSIS — A74.9 CHLAMYDIA: Primary | ICD-10-CM

## 2022-11-08 RX ORDER — AZITHROMYCIN 500 MG/1
1000 TABLET, FILM COATED ORAL DAILY
Qty: 2 TABLET | Refills: 1 | Status: SHIPPED | OUTPATIENT
Start: 2022-11-08 | End: 2022-12-01 | Stop reason: ALTCHOICE

## 2022-11-18 RX ORDER — AZITHROMYCIN 500 MG/1
TABLET, FILM COATED ORAL
Qty: 2 TABLET | Refills: 0 | Status: SHIPPED | OUTPATIENT
Start: 2022-11-18 | End: 2022-12-01

## 2022-11-30 NOTE — PROGRESS NOTES
MERVIN Felton is a 52 y.o. female seen for chlamydia MARJORIE. Past Medical History, Past Surgical History, Family history, Social History, and Medications were all reviewed with the patient today and updated as necessary. Current Outpatient Medications   Medication Sig    amphetamine-dextroamphetamine (ADDERALL XR) 5 MG extended release capsule Take 5 mg by mouth.    zolpidem (AMBIEN) 5 MG tablet Take 5 mg by mouth. No current facility-administered medications for this visit. Allergies   Allergen Reactions    Other Rash     Sardines     Past Medical History:   Diagnosis Date    Anemia     has never had blood transfusion    Anxiety     Chlamydia     Depression     Genital herpes     no issues since    Hypertension in pregnancy, preeclampsia, severe, delivered/postpartum 8/3/2020    Hypothyroidism     states \"borderline\"    LGSIL on Pap smear of cervix 3/19/2020     Past Surgical History:   Procedure Laterality Date     SECTION      2020    COLPOSCOPY      21    WISDOM TOOTH EXTRACTION       Family History   Problem Relation Age of Onset    Diabetes Mother     Hypertension Mother     Cancer Maternal Grandmother         Uterine cancer?     Cancer Paternal Grandmother         Lung Cancer- smoker      Social History     Tobacco Use    Smoking status: Never    Smokeless tobacco: Never   Substance Use Topics    Alcohol use: Yes     Comment: occ       Social History     Substance and Sexual Activity   Sexual Activity Yes    Partners: Male    Birth control/protection: None     OB History    Para Term  AB Living   1 1 0 0 0 1   SAB IAB Ectopic Molar Multiple Live Births   0 0 0 0 0 0      # Outcome Date GA Lbr Jordon/2nd Weight Sex Delivery Anes PTL Lv   1 Para               Obstetric Comments   NVD   1  Stillborn       Health Maintenance  Mammogram:   Colonoscopy:   Bone Density:    ROS:    Review of Systems  General: Not Present- Chills, Fever, Fatigue, Insomnia, Hot flashes/Night sweats, Weight gain  Skin: Not Present- Bruising, Change in Wart/Mole, Excessive Sweating, Itching, Nail Changes, New Lesions, Rash, Skin Color Changes and Ulcer. HEENT: Not Present- Headache, Blurred Vision, Double Vision, Glaucoma, Visual Disturbances, Hearing Loss, Ringing in the Ears, Vertigo, Nose Bleed, Bleeding Gums, Hoarseness and Sore Throat. Neck: Not Present- Neck Pain and Neck Swelling. Respiratory: Not Present- Cough, Difficulty Breathing and Difficulty Breathing on Exertion. Breast: Not Present- Breast Mass, Breast Pain, Breast Swelling, Nipple Discharge, Nipple Pain, Recent Breast Size Changes and Skin Changes. Cardiovascular: Not Present- Abnormal Blood Pressure, Chest Pain, Edema, Fainting / Blacking Out, Palpitations, Shortness of Breath and Swelling of Extremities. Gastrointestinal: Not Present- Abdominal Pain, Abdominal Swelling, Bloating, Change in Bowel Habits, Constipation, Diarrhea, Difficulty Swallowing, Gets full quickly at meals, Nausea, Rectal Bleeding and Vomiting. Female Genitourinary: Not Present- Dysmenorrhea, Dyspareunia, Decreased libido, Excessive Menstrual Bleeding, Menstrual Irregularities, Pelvic Pain, Urinary Complaints, Vaginal Discharge, Vaginal itching/burning, Vaginal odor  Musculoskeletal: Not Present- Joint Pain and Muscle Pain. Neurological: Not Present- Dizziness, Fainting, Headaches and Seizures. Psychiatric: Not Present- Anxiety, Depression, Mood changes and Panic Attacks. Endocrine: Not Present- Appetite Changes, Cold Intolerance, Excessive Thirst, Excessive Urination and Heat Intolerance. Hematology: Not Present- Abnormal Bleeding, Easy Bruising and Enlarged Lymph Nodes. PHYSICAL EXAM:    /80 (Position: Sitting)   Ht 5' 5\" (1.651 m)   Wt 208 lb (94.3 kg)   BMI 34.61 kg/m²     Physical Exam   General   Mental Status - Alert. General Appearance - Cooperative.      Abdomen   Inspection: - Inspection Normal. Palpation/Percussion: Palpation and Percussion of the abdomen reveal - Non Tender, No Rebound tenderness, No Rigidity (guarding), No hepatosplenomegaly, No Palpable abdominal masses and Soft. Auscultation: Auscultation of the abdomen reveals - Bowel sounds normal.     Female Genitourinary     External Genitalia   Vulva: - Normal. Perineum - Normal. Bartholin's Gland - Bilateral - Normal. Clitoris - Normal.   Introitus: Characteristics - Normal.   Urethra: Characteristics - Normal.     Speculum & Bimanual   Vagina: Vaginal Mucosa - Normal.   Vaginal Wall: - Normal.   Vaginal Lesions - None. Cervix: Characteristics - Normal.   Uterus: Characteristics - Normal.   Adnexa: - Normal.   Bladder - Normal.     Lymphatics  No cervical, axillary or groin adenopathy            Medical problems and test results were reviewed with the patient today. ASSESSMENT and PLAN    1. Chlamydia  -     Nuswab Vaginitis Plus (VG+)           Time:  I spent  30 minutes in preparing to see patient (including chart review and preparation), obtaining and/or reviewing additional medical history, performing a physical exam and evaluation, documenting clinical information in the electronic health record, independently interpreting results, communicating results to patient, family or caregiver, and/or coordinating care. No follow-ups on file.        Jersey Booker MD

## 2022-12-01 ENCOUNTER — OFFICE VISIT (OUTPATIENT)
Dept: GYNECOLOGY | Age: 47
End: 2022-12-01
Payer: COMMERCIAL

## 2022-12-01 VITALS
WEIGHT: 208 LBS | DIASTOLIC BLOOD PRESSURE: 80 MMHG | HEIGHT: 65 IN | BODY MASS INDEX: 34.66 KG/M2 | SYSTOLIC BLOOD PRESSURE: 124 MMHG

## 2022-12-01 DIAGNOSIS — A74.9 CHLAMYDIA: Primary | ICD-10-CM

## 2022-12-01 PROCEDURE — 99214 OFFICE O/P EST MOD 30 MIN: CPT | Performed by: OBSTETRICS & GYNECOLOGY

## 2022-12-04 LAB
A VAGINAE DNA VAG QL NAA+PROBE: ABNORMAL SCORE
BVAB2 DNA VAG QL NAA+PROBE: ABNORMAL SCORE
C ALBICANS DNA VAG QL NAA+PROBE: NEGATIVE
C GLABRATA DNA VAG QL NAA+PROBE: NEGATIVE
C TRACH RRNA SPEC QL NAA+PROBE: NEGATIVE
MEGA1 DNA VAG QL NAA+PROBE: ABNORMAL SCORE
N GONORRHOEA RRNA SPEC QL NAA+PROBE: NEGATIVE
SPECIMEN SOURCE: ABNORMAL
T VAGINALIS RRNA SPEC QL NAA+PROBE: NEGATIVE

## 2022-12-06 DIAGNOSIS — N76.0 BV (BACTERIAL VAGINOSIS): Primary | ICD-10-CM

## 2022-12-06 DIAGNOSIS — B96.89 BV (BACTERIAL VAGINOSIS): Primary | ICD-10-CM

## 2022-12-06 RX ORDER — CLINDAMYCIN HYDROCHLORIDE 300 MG/1
300 CAPSULE ORAL 2 TIMES DAILY
Qty: 14 CAPSULE | Refills: 0 | Status: SHIPPED | OUTPATIENT
Start: 2022-12-06 | End: 2022-12-13

## 2023-03-01 ENCOUNTER — OFFICE VISIT (OUTPATIENT)
Dept: FAMILY MEDICINE CLINIC | Facility: CLINIC | Age: 48
End: 2023-03-01
Payer: COMMERCIAL

## 2023-03-01 VITALS
BODY MASS INDEX: 34.49 KG/M2 | RESPIRATION RATE: 16 BRPM | HEIGHT: 65 IN | SYSTOLIC BLOOD PRESSURE: 110 MMHG | OXYGEN SATURATION: 99 % | DIASTOLIC BLOOD PRESSURE: 78 MMHG | TEMPERATURE: 97.5 F | WEIGHT: 207 LBS | HEART RATE: 83 BPM

## 2023-03-01 DIAGNOSIS — F51.01 PRIMARY INSOMNIA: ICD-10-CM

## 2023-03-01 DIAGNOSIS — F90.2 ADHD (ATTENTION DEFICIT HYPERACTIVITY DISORDER), COMBINED TYPE: Primary | ICD-10-CM

## 2023-03-01 PROCEDURE — 99214 OFFICE O/P EST MOD 30 MIN: CPT | Performed by: NURSE PRACTITIONER

## 2023-03-01 RX ORDER — ZOLPIDEM TARTRATE 5 MG/1
5 TABLET ORAL NIGHTLY PRN
Qty: 90 TABLET | Refills: 0 | Status: SHIPPED | OUTPATIENT
Start: 2023-03-01 | End: 2023-05-30

## 2023-03-01 RX ORDER — VEHICLE BASE NO.10
PELLET (GRAM) MISCELLANEOUS
COMMUNITY

## 2023-03-01 RX ORDER — DEXTROAMPHETAMINE SACCHARATE, AMPHETAMINE ASPARTATE MONOHYDRATE, DEXTROAMPHETAMINE SULFATE AND AMPHETAMINE SULFATE 1.25; 1.25; 1.25; 1.25 MG/1; MG/1; MG/1; MG/1
5 CAPSULE, EXTENDED RELEASE ORAL DAILY
Qty: 30 CAPSULE | Refills: 0 | Status: SHIPPED | OUTPATIENT
Start: 2023-03-01 | End: 2023-03-31

## 2023-03-01 RX ORDER — DEXTROAMPHETAMINE SACCHARATE, AMPHETAMINE ASPARTATE MONOHYDRATE, DEXTROAMPHETAMINE SULFATE AND AMPHETAMINE SULFATE 1.25; 1.25; 1.25; 1.25 MG/1; MG/1; MG/1; MG/1
5 CAPSULE, EXTENDED RELEASE ORAL DAILY
Qty: 30 CAPSULE | Refills: 0 | Status: SHIPPED | OUTPATIENT
Start: 2023-03-31 | End: 2023-04-30

## 2023-03-01 RX ORDER — DEXTROAMPHETAMINE SACCHARATE, AMPHETAMINE ASPARTATE MONOHYDRATE, DEXTROAMPHETAMINE SULFATE AND AMPHETAMINE SULFATE 1.25; 1.25; 1.25; 1.25 MG/1; MG/1; MG/1; MG/1
5 CAPSULE, EXTENDED RELEASE ORAL DAILY
Qty: 30 CAPSULE | Refills: 0 | Status: SHIPPED | OUTPATIENT
Start: 2023-04-30 | End: 2023-05-30

## 2023-03-01 SDOH — ECONOMIC STABILITY: INCOME INSECURITY: HOW HARD IS IT FOR YOU TO PAY FOR THE VERY BASICS LIKE FOOD, HOUSING, MEDICAL CARE, AND HEATING?: NOT VERY HARD

## 2023-03-01 SDOH — ECONOMIC STABILITY: FOOD INSECURITY: WITHIN THE PAST 12 MONTHS, YOU WORRIED THAT YOUR FOOD WOULD RUN OUT BEFORE YOU GOT MONEY TO BUY MORE.: NEVER TRUE

## 2023-03-01 SDOH — ECONOMIC STABILITY: HOUSING INSECURITY
IN THE LAST 12 MONTHS, WAS THERE A TIME WHEN YOU DID NOT HAVE A STEADY PLACE TO SLEEP OR SLEPT IN A SHELTER (INCLUDING NOW)?: NO

## 2023-03-01 SDOH — ECONOMIC STABILITY: FOOD INSECURITY: WITHIN THE PAST 12 MONTHS, THE FOOD YOU BOUGHT JUST DIDN'T LAST AND YOU DIDN'T HAVE MONEY TO GET MORE.: NEVER TRUE

## 2023-03-01 ASSESSMENT — PATIENT HEALTH QUESTIONNAIRE - PHQ9
SUM OF ALL RESPONSES TO PHQ QUESTIONS 1-9: 0
2. FEELING DOWN, DEPRESSED OR HOPELESS: 0
SUM OF ALL RESPONSES TO PHQ QUESTIONS 1-9: 0
SUM OF ALL RESPONSES TO PHQ QUESTIONS 1-9: 0
SUM OF ALL RESPONSES TO PHQ9 QUESTIONS 1 & 2: 0
1. LITTLE INTEREST OR PLEASURE IN DOING THINGS: 0
SUM OF ALL RESPONSES TO PHQ QUESTIONS 1-9: 0

## 2023-03-01 ASSESSMENT — ENCOUNTER SYMPTOMS
DIARRHEA: 0
RHINORRHEA: 0
CHEST TIGHTNESS: 0
EYE DISCHARGE: 0
BLOOD IN STOOL: 0
EYE PAIN: 0
ABDOMINAL PAIN: 0
WHEEZING: 0
COUGH: 0
CONSTIPATION: 0
VOMITING: 0
SHORTNESS OF BREATH: 0

## 2023-03-13 ENCOUNTER — OFFICE VISIT (OUTPATIENT)
Dept: FAMILY MEDICINE CLINIC | Facility: CLINIC | Age: 48
End: 2023-03-13
Payer: COMMERCIAL

## 2023-03-13 VITALS
OXYGEN SATURATION: 98 % | SYSTOLIC BLOOD PRESSURE: 108 MMHG | TEMPERATURE: 97.1 F | DIASTOLIC BLOOD PRESSURE: 72 MMHG | BODY MASS INDEX: 34.66 KG/M2 | WEIGHT: 208 LBS | RESPIRATION RATE: 18 BRPM | HEIGHT: 65 IN | HEART RATE: 80 BPM

## 2023-03-13 DIAGNOSIS — F43.21 GRIEF AT LOSS OF CHILD: ICD-10-CM

## 2023-03-13 DIAGNOSIS — R53.82 CHRONIC FATIGUE: ICD-10-CM

## 2023-03-13 DIAGNOSIS — Z63.4 GRIEF AT LOSS OF CHILD: ICD-10-CM

## 2023-03-13 DIAGNOSIS — Z00.00 PHYSICAL EXAM, ANNUAL: Primary | ICD-10-CM

## 2023-03-13 DIAGNOSIS — Z00.00 PHYSICAL EXAM, ANNUAL: ICD-10-CM

## 2023-03-13 DIAGNOSIS — Z12.31 ENCOUNTER FOR SCREENING MAMMOGRAM FOR MALIGNANT NEOPLASM OF BREAST: ICD-10-CM

## 2023-03-13 DIAGNOSIS — Z12.11 SCREENING FOR COLON CANCER: ICD-10-CM

## 2023-03-13 LAB
BASOPHILS # BLD: 0.1 K/UL (ref 0–0.2)
BASOPHILS NFR BLD: 1 % (ref 0–2)
DIFFERENTIAL METHOD BLD: NORMAL
EOSINOPHIL # BLD: 0.5 K/UL (ref 0–0.8)
EOSINOPHIL NFR BLD: 6 % (ref 0.5–7.8)
ERYTHROCYTE [DISTWIDTH] IN BLOOD BY AUTOMATED COUNT: 13.7 % (ref 11.9–14.6)
HCT VFR BLD AUTO: 38 % (ref 35.8–46.3)
HGB BLD-MCNC: 12.1 G/DL (ref 11.7–15.4)
IMM GRANULOCYTES # BLD AUTO: 0 K/UL (ref 0–0.5)
IMM GRANULOCYTES NFR BLD AUTO: 0 % (ref 0–5)
LYMPHOCYTES # BLD: 3.6 K/UL (ref 0.5–4.6)
LYMPHOCYTES NFR BLD: 42 % (ref 13–44)
MCH RBC QN AUTO: 28.9 PG (ref 26.1–32.9)
MCHC RBC AUTO-ENTMCNC: 31.8 G/DL (ref 31.4–35)
MCV RBC AUTO: 90.7 FL (ref 82–102)
MONOCYTES # BLD: 0.6 K/UL (ref 0.1–1.3)
MONOCYTES NFR BLD: 6 % (ref 4–12)
NEUTS SEG # BLD: 3.8 K/UL (ref 1.7–8.2)
NEUTS SEG NFR BLD: 45 % (ref 43–78)
NRBC # BLD: 0 K/UL (ref 0–0.2)
PLATELET # BLD AUTO: 351 K/UL (ref 150–450)
PMV BLD AUTO: 9.4 FL (ref 9.4–12.3)
RBC # BLD AUTO: 4.19 M/UL (ref 4.05–5.2)
WBC # BLD AUTO: 8.6 K/UL (ref 4.3–11.1)

## 2023-03-13 PROCEDURE — 99396 PREV VISIT EST AGE 40-64: CPT | Performed by: NURSE PRACTITIONER

## 2023-03-13 SDOH — ECONOMIC STABILITY: FOOD INSECURITY: WITHIN THE PAST 12 MONTHS, YOU WORRIED THAT YOUR FOOD WOULD RUN OUT BEFORE YOU GOT MONEY TO BUY MORE.: NEVER TRUE

## 2023-03-13 SDOH — ECONOMIC STABILITY: FOOD INSECURITY: WITHIN THE PAST 12 MONTHS, THE FOOD YOU BOUGHT JUST DIDN'T LAST AND YOU DIDN'T HAVE MONEY TO GET MORE.: NEVER TRUE

## 2023-03-13 SDOH — ECONOMIC STABILITY: INCOME INSECURITY: HOW HARD IS IT FOR YOU TO PAY FOR THE VERY BASICS LIKE FOOD, HOUSING, MEDICAL CARE, AND HEATING?: NOT HARD AT ALL

## 2023-03-13 SDOH — SOCIAL STABILITY - SOCIAL INSECURITY: DISSAPEARANCE AND DEATH OF FAMILY MEMBER: Z63.4

## 2023-03-13 ASSESSMENT — ENCOUNTER SYMPTOMS
EYE DISCHARGE: 0
SINUS PAIN: 0
BACK PAIN: 0
COLOR CHANGE: 0
CONSTIPATION: 0
NAUSEA: 0
RHINORRHEA: 0
SORE THROAT: 0
DIARRHEA: 0
CHEST TIGHTNESS: 0
COUGH: 0
ABDOMINAL PAIN: 0
EYE PAIN: 0
WHEEZING: 0
VOMITING: 0
SHORTNESS OF BREATH: 0

## 2023-03-13 ASSESSMENT — PATIENT HEALTH QUESTIONNAIRE - PHQ9
SUM OF ALL RESPONSES TO PHQ QUESTIONS 1-9: 0
2. FEELING DOWN, DEPRESSED OR HOPELESS: 0
SUM OF ALL RESPONSES TO PHQ QUESTIONS 1-9: 0
SUM OF ALL RESPONSES TO PHQ9 QUESTIONS 1 & 2: 0
1. LITTLE INTEREST OR PLEASURE IN DOING THINGS: 0
SUM OF ALL RESPONSES TO PHQ QUESTIONS 1-9: 0
SUM OF ALL RESPONSES TO PHQ QUESTIONS 1-9: 0

## 2023-03-13 NOTE — PROGRESS NOTES
Madelyn Alejo (:  1975) is a 52 y.o. female, Established patient, here for evaluation of the following chief complaint(s): Annual Exam (Pt. Is here for physical)    Note written by Gabriela Hernandez RN, student NP. I have examined the patient and agree with the note/plan. Arnol Cesar         ASSESSMENT/PLAN:    1. Physical exam, annual  Will drawn annual labs below for fatigue symptoms, disease prevention, and health maintenance. Do not suspect this is hormone-related as these levels were WNL when checked in 2023. Discussed if she notices changes in periods such has sporadic bleeding, infrequent menses, or decreasing menses this may indicate early menopause which could decrease libido but menopause not suspected at this time. Aim for 150 min of moderate exercise per week. Increase intake of fruits, vegetables, whole grains, lean proteins. Limit intake of processed sugars, fatty foods, ETOH. Avoid smoking or social drugs. Wear seat belts for protection.   - TSH; Future  - Lipid Panel; Future  - Hemoglobin A1C; Future  - CBC with Auto Differential; Future  - Comprehensive Metabolic Panel; Future  - Iron; Future  - Ferritin; Future    2. Chronic fatigue  Will also draw BRADLEY level today to rule out fatigue related to Lupus or other autoimmune cause.   - TSH; Future  - CBC with Auto Differential; Future  - Comprehensive Metabolic Panel; Future  - Iron; Future  - Ferritin; Future  - [facility] (Counseling)  - BRADLEY, Direct, w/Reflex; Future    3. Grief at loss of child  Referral made to [facility] for counseling related to depression and grief after loss of child as patient became tearful when discussing in today's appt. To ER with suicidal thoughts or ideation.  - [facility] (Counseling)    4.  Encounter for screening mammogram for malignant neoplasm of breast  Order placed for patient to have mammogram at facility of her choice. Continue seeing OBGYN for pap and other gyn-related complaints. - PRABHAKAR DIGITAL SCREEN W OR WO CAD BILATERAL; Future    5. Screening for colon cancer  Referral made to [facility] for colonoscopy for colon cancer screening as recommended based on age-related screening guidelines. -  [facility] - Colonoscopy      Follow up in 2-3 months for med refills and in 1 year for annual exam or sooner as needed with problems. Will update patient on all lab results drawn from today. Subjective   SUBJECTIVE/OBJECTIVE:  Patient presents for annual physical and lab work. Report ongoing fatigue, apathy, low libido for at least 3 months. She had this worked up at a HRT clinic thinking she needed testosterone replacement and was started on testosterone troches although her lab work at this clinic resulted in all normal hormone levels and did not show a testosterone deficiency or early menopause. Reports very irregular periods, can be every 4-6 weeks and last 2-5 days with moderate bleeding. She continues to take the troches but does not notice a huge difference in energy levels. Also reports overall apathy at work and boredom with activities she once enjoyed. Reports not working out much due to low energy levels. She continues to take Adderall and this helps some with energy and focus. She feels she is not experiencing normal sensation during intercourse and this problem has been ongoing since her  after spontaneous fetal demise in . Reports that she feels she may be depressed but has tried Effexor and one other antidepressant but did not like the way these medication made her feel. She is not interested in going on another medication at this time but is interested in therapy.  She feels like she somewhat dealt with the the emotional toll of her stillborn child but feels like her support system has been unable to empathize with her concerning this. Would like annual mammogram and would like colonoscopy. Allergies   Allergen Reactions    Other Rash     Sardines     Current Outpatient Medications   Medication Sig    [employer] POWD by Does not apply route    amphetamine-dextroamphetamine (ADDERALL XR) 5 MG extended release capsule Take 1 capsule by mouth daily for 30 days. Max Daily Amount: 5 mg    [START ON 3/31/2023] amphetamine-dextroamphetamine (ADDERALL XR) 5 MG extended release capsule Take 1 capsule by mouth daily for 30 days. Max Daily Amount: 5 mg    [START ON 4/30/2023] amphetamine-dextroamphetamine (ADDERALL XR) 5 MG extended release capsule Take 1 capsule by mouth daily for 30 days. Max Daily Amount: 5 mg    zolpidem (AMBIEN) 5 MG tablet Take 1 tablet by mouth nightly as needed for Sleep for up to 90 days. Max Daily Amount: 5 mg    amphetamine-dextroamphetamine (ADDERALL XR) 5 MG extended release capsule Take 5 mg by mouth. No current facility-administered medications for this visit.      Social History     Socioeconomic History    Marital status: Single     Spouse name: Not on file    Number of children: Not on file    Years of education: Not on file    Highest education level: Not on file   Occupational History    Not on file   Tobacco Use    Smoking status: Never    Smokeless tobacco: Never   Vaping Use    Vaping Use: Never used   Substance and Sexual Activity    Alcohol use: Yes     Comment: occ    Drug use: Not Currently    Sexual activity: Yes     Partners: Male     Birth control/protection: None   Other Topics Concern    Not on file   Social History Narrative    Not on file     Social Determinants of Health     Financial Resource Strain: Low Risk     Difficulty of Paying Living Expenses: Not hard at all   Food Insecurity: No Food Insecurity    Worried About Running Out of Food in the Last Year: Never true    [email or web address] in the Last Year: Never true   Transportation Needs: Unknown    Lack of Transportation (Medical): Not on file    Lack of Transportation (Non-Medical): No   Physical Activity: Not on file   Stress: Not on file   Social Connections: Not on file   Intimate Partner Violence: Not on file   Housing Stability: Unknown    Unable to Pay for Housing in the Last Year: Not on file    Number of Places Lived in the Last Year: Not on file    Unstable Housing in the Last Year: No     Active Ambulatory Problems     Diagnosis Date Noted    Antepartum placental abruption 07/29/2020    Postpartum hypertension 08/14/2020    H/O herpes simplex infection 02/18/2020    Fetal demise, greater than 22 weeks, postpartum exam 08/14/2020    LGSIL on Pap smear of cervix 03/19/2020    Postpartum care and examination 08/14/2020     Resolved Ambulatory Problems     Diagnosis Date Noted    No Resolved Ambulatory Problems     Past Medical History:   Diagnosis Date    Anemia     Anxiety     Chlamydia     Depression     Genital herpes 2007    Hormone replacement therapy     Hypertension in pregnancy, preeclampsia, severe, delivered/postpartum 8/3/2020    Hypothyroidism      PHQ-9 Total Score: 0 (3/13/2023  2:13 PM)      Review of Systems   Constitutional:  Positive for fatigue. Negative for chills, diaphoresis, fever and unexpected weight change. HENT:  Negative for congestion, ear discharge, ear pain, hearing loss, rhinorrhea, sinus pain and sore throat. Eyes:  Negative for pain and discharge. Respiratory:  Negative for cough, chest tightness, shortness of breath and wheezing. Cardiovascular:  Negative for chest pain, palpitations and leg swelling. Gastrointestinal:  Negative for abdominal pain, constipation, diarrhea, nausea and vomiting. Genitourinary:  Negative for dysuria, enuresis, frequency, menstrual problem, pelvic pain and urgency. Musculoskeletal:  Positive for myalgias. Negative for arthralgias, back pain, gait problem and joint swelling.         Intermittent plantar fascitis   Skin:  Negative for color change, rash and wound. Neurological:  Negative for dizziness, seizures, syncope, weakness, light-headedness, numbness and headaches. Hematological:  Negative for adenopathy. Psychiatric/Behavioral:  Positive for decreased concentration and dysphoric mood. Negative for sleep disturbance. The patient is not nervous/anxious. Low libido        Objective   /72 (Site: Left Upper Arm, Position: Sitting, Cuff Size: Large Adult)   Pulse 80   Temp 97.1 °F (36.2 °C) (Temporal)   Resp 18   Ht 5' 5\" (1.651 m)   Wt 208 lb (94.3 kg)   LMP 03/06/2023 (Approximate)   SpO2 98%   BMI 34.61 kg/m²     Physical Exam  Constitutional:       General: She is not in acute distress. Appearance: Normal appearance. She is obese. HENT:      Head: Normocephalic and atraumatic. Right Ear: Tympanic membrane, ear canal and external ear normal.      Left Ear: Tympanic membrane, ear canal and external ear normal.      Nose: Nose normal. No congestion or rhinorrhea. Mouth/Throat:      Mouth: Mucous membranes are moist.      Pharynx: Oropharynx is clear. No posterior oropharyngeal erythema. Eyes:      Extraocular Movements: Extraocular movements intact. Conjunctiva/sclera: Conjunctivae normal.      Pupils: Pupils are equal, round, and reactive to light. Cardiovascular:      Rate and Rhythm: Normal rate and regular rhythm. Pulses: Normal pulses. Heart sounds: Normal heart sounds. Pulmonary:      Effort: Pulmonary effort is normal.      Breath sounds: Normal breath sounds. No wheezing, rhonchi or rales. Abdominal:      General: Abdomen is flat. Bowel sounds are normal.      Palpations: Abdomen is soft. Hernia: No hernia is present. Musculoskeletal:         General: No tenderness or deformity. Normal range of motion. Cervical back: Normal range of motion and neck supple. No tenderness. Right lower leg: No edema.       Left lower leg: No edema.   Lymphadenopathy:      Cervical: No cervical adenopathy. Skin:     General: Skin is warm and dry. Findings: No lesion or rash. Neurological:      General: No focal deficit present. Mental Status: She is alert and oriented to person, place, and time. Sensory: No sensory deficit. Motor: No weakness. Gait: Gait normal.   Psychiatric:         Attention and Perception: Attention normal.         Mood and Affect: Mood normal. Affect is flat and tearful. Behavior: Behavior normal. Behavior is cooperative. Thought Content: Thought content normal.         Judgment: Judgment normal.      Comments: Patient is tearful when asked if she is depressed or has dealt with loss of child. PHQ-9 Total Score: 0 (3/13/2023  2:13 PM)  Body mass index is 34.61 kg/m². On this date 3/13/2023 I have spent 40 minutes reviewing previous notes, test results and face to face with the patient discussing the diagnosis and importance of compliance with the treatment plan as well as documenting on the day of the visit. An electronic signature was used to authenticate this note.     --[facility]

## 2023-03-14 LAB
ALBUMIN SERPL-MCNC: 3.6 G/DL (ref 3.5–5)
ALBUMIN/GLOB SERPL: 1.1 (ref 0.4–1.6)
ALP SERPL-CCNC: 56 U/L (ref 50–136)
ALT SERPL-CCNC: 34 U/L (ref 12–65)
ANION GAP SERPL CALC-SCNC: 4 MMOL/L (ref 2–11)
AST SERPL-CCNC: 18 U/L (ref 15–37)
BILIRUB SERPL-MCNC: 0.6 MG/DL (ref 0.2–1.1)
BUN SERPL-MCNC: 9 MG/DL (ref 6–23)
CALCIUM SERPL-MCNC: 9.3 MG/DL (ref 8.3–10.4)
CHLORIDE SERPL-SCNC: 110 MMOL/L (ref 101–110)
CHOLEST SERPL-MCNC: 126 MG/DL
CO2 SERPL-SCNC: 26 MMOL/L (ref 21–32)
CREAT SERPL-MCNC: 0.7 MG/DL (ref 0.6–1)
EST. AVERAGE GLUCOSE BLD GHB EST-MCNC: 105 MG/DL
FERRITIN SERPL-MCNC: 8 NG/ML (ref 8–388)
GLOBULIN SER CALC-MCNC: 3.3 G/DL (ref 2.8–4.5)
GLUCOSE SERPL-MCNC: 95 MG/DL (ref 65–100)
HBA1C MFR BLD: 5.3 % (ref 4.8–5.6)
HDLC SERPL-MCNC: 55 MG/DL (ref 40–60)
HDLC SERPL: 2.3
IRON SERPL-MCNC: 27 UG/DL (ref 35–150)
LDLC SERPL CALC-MCNC: 55.4 MG/DL
POTASSIUM SERPL-SCNC: 4.3 MMOL/L (ref 3.5–5.1)
PROT SERPL-MCNC: 6.9 G/DL (ref 6.3–8.2)
SODIUM SERPL-SCNC: 140 MMOL/L (ref 133–143)
TRIGL SERPL-MCNC: 78 MG/DL (ref 35–150)
TSH, 3RD GENERATION: 0.37 UIU/ML (ref 0.36–3.74)
VLDLC SERPL CALC-MCNC: 15.6 MG/DL (ref 6–23)

## 2023-03-15 LAB
ANA SER QL: POSITIVE
CENTROMERE B AB SER-ACNC: <0.2 AI (ref 0–0.9)
CHROMATIN AB SERPL-ACNC: <0.2 AI (ref 0–0.9)
DSDNA AB SER-ACNC: <1 IU/ML (ref 0–9)
ENA JO1 AB SER-ACNC: <0.2 AI (ref 0–0.9)
ENA RNP AB SER-ACNC: <0.2 AI (ref 0–0.9)
ENA SCL70 AB SER-ACNC: <0.2 AI (ref 0–0.9)
ENA SM AB SER-ACNC: <0.2 AI (ref 0–0.9)
ENA SS-A AB SER-ACNC: 1.1 AI (ref 0–0.9)
ENA SS-B AB SER-ACNC: <0.2 AI (ref 0–0.9)
Lab: ABNORMAL

## 2023-03-22 ENCOUNTER — CLINICAL DOCUMENTATION (OUTPATIENT)
Dept: SURGERY | Age: 48
End: 2023-03-22

## 2023-03-22 NOTE — PROGRESS NOTES
Referral received for routine colonoscopy- screening or surveillance only.  Chart reviewed by me on March 22, 2023. initial    Pt found to be acceptable candidate for direct scheduling if they are interested with the following special instructions (if any):

## 2023-04-11 ENCOUNTER — TELEPHONE (OUTPATIENT)
Dept: FAMILY MEDICINE CLINIC | Facility: CLINIC | Age: 48
End: 2023-04-11

## 2023-04-17 ENCOUNTER — TELEPHONE (OUTPATIENT)
Dept: FAMILY MEDICINE CLINIC | Facility: CLINIC | Age: 48
End: 2023-04-17

## 2023-04-17 ENCOUNTER — HOSPITAL ENCOUNTER (OUTPATIENT)
Dept: GENERAL RADIOLOGY | Age: 48
Discharge: HOME | End: 2023-04-19
Payer: COMMERCIAL

## 2023-04-17 ENCOUNTER — OFFICE VISIT (OUTPATIENT)
Dept: FAMILY MEDICINE CLINIC | Facility: CLINIC | Age: 48
End: 2023-04-17
Payer: COMMERCIAL

## 2023-04-17 VITALS
DIASTOLIC BLOOD PRESSURE: 72 MMHG | OXYGEN SATURATION: 98 % | WEIGHT: 204 LBS | SYSTOLIC BLOOD PRESSURE: 118 MMHG | BODY MASS INDEX: 33.99 KG/M2 | HEART RATE: 105 BPM | TEMPERATURE: 97.6 F | HEIGHT: 65 IN | RESPIRATION RATE: 18 BRPM

## 2023-04-17 DIAGNOSIS — M25.561 ACUTE PAIN OF RIGHT KNEE: Primary | ICD-10-CM

## 2023-04-17 DIAGNOSIS — M25.461 SWELLING OF RIGHT KNEE JOINT: ICD-10-CM

## 2023-04-17 DIAGNOSIS — F51.01 PRIMARY INSOMNIA: ICD-10-CM

## 2023-04-17 DIAGNOSIS — M25.561 ACUTE PAIN OF RIGHT KNEE: ICD-10-CM

## 2023-04-17 DIAGNOSIS — R76.8 POSITIVE ANA (ANTINUCLEAR ANTIBODY): ICD-10-CM

## 2023-04-17 PROCEDURE — 73562 X-RAY EXAM OF KNEE 3: CPT

## 2023-04-17 PROCEDURE — 99214 OFFICE O/P EST MOD 30 MIN: CPT | Performed by: NURSE PRACTITIONER

## 2023-04-17 RX ORDER — PREDNISONE 10 MG/1
TABLET ORAL
Qty: 48 EACH | Refills: 0 | Status: SHIPPED | OUTPATIENT
Start: 2023-04-17

## 2023-04-17 RX ORDER — ZOLPIDEM TARTRATE 5 MG/1
5 TABLET ORAL NIGHTLY PRN
Qty: 90 TABLET | Refills: 0 | Status: CANCELLED | OUTPATIENT
Start: 2023-04-17 | End: 2023-07-16

## 2023-04-17 SDOH — ECONOMIC STABILITY: INCOME INSECURITY: HOW HARD IS IT FOR YOU TO PAY FOR THE VERY BASICS LIKE FOOD, HOUSING, MEDICAL CARE, AND HEATING?: NOT HARD AT ALL

## 2023-04-17 SDOH — ECONOMIC STABILITY: FOOD INSECURITY: WITHIN THE PAST 12 MONTHS, THE FOOD YOU BOUGHT JUST DIDN'T LAST AND YOU DIDN'T HAVE MONEY TO GET MORE.: NEVER TRUE

## 2023-04-17 SDOH — ECONOMIC STABILITY: FOOD INSECURITY: WITHIN THE PAST 12 MONTHS, YOU WORRIED THAT YOUR FOOD WOULD RUN OUT BEFORE YOU GOT MONEY TO BUY MORE.: NEVER TRUE

## 2023-04-17 ASSESSMENT — ENCOUNTER SYMPTOMS
EYE DISCHARGE: 0
RHINORRHEA: 0
CHEST TIGHTNESS: 0
CONSTIPATION: 0
DIARRHEA: 0
BLOOD IN STOOL: 0
WHEEZING: 0
SHORTNESS OF BREATH: 0
EYE PAIN: 0

## 2023-04-17 ASSESSMENT — PATIENT HEALTH QUESTIONNAIRE - PHQ9
SUM OF ALL RESPONSES TO PHQ QUESTIONS 1-9: 0
SUM OF ALL RESPONSES TO PHQ9 QUESTIONS 1 & 2: 0
2. FEELING DOWN, DEPRESSED OR HOPELESS: 0
1. LITTLE INTEREST OR PLEASURE IN DOING THINGS: 0

## 2023-04-20 ENCOUNTER — TELEPHONE (OUTPATIENT)
Dept: FAMILY MEDICINE CLINIC | Facility: CLINIC | Age: 48
End: 2023-04-20

## 2023-04-20 NOTE — TELEPHONE ENCOUNTER
Patient needs work note to return to work tonight  note written and printed out for patient.   Ansley Cano

## 2023-04-21 ENCOUNTER — TELEPHONE (OUTPATIENT)
Dept: FAMILY MEDICINE CLINIC | Facility: CLINIC | Age: 48
End: 2023-04-21

## 2023-04-26 ENCOUNTER — TELEPHONE (OUTPATIENT)
Dept: FAMILY MEDICINE CLINIC | Facility: CLINIC | Age: 48
End: 2023-04-26

## 2023-05-04 ENCOUNTER — OFFICE VISIT (OUTPATIENT)
Dept: ORTHOPEDIC SURGERY | Age: 48
End: 2023-05-04

## 2023-05-04 DIAGNOSIS — M25.561 RIGHT KNEE PAIN, UNSPECIFIED CHRONICITY: ICD-10-CM

## 2023-05-04 DIAGNOSIS — M70.50 PES ANSERINE BURSITIS: Primary | ICD-10-CM

## 2023-05-04 RX ORDER — DICLOFENAC SODIUM 75 MG/1
75 TABLET, DELAYED RELEASE ORAL 2 TIMES DAILY
Qty: 60 TABLET | Refills: 0 | Status: SHIPPED | OUTPATIENT
Start: 2023-05-04

## 2023-05-04 NOTE — PROGRESS NOTES
Name: Matthew Gray  YOB: 1975  Gender: female  MRN: [id number]      CC: Knee Pain (Right)       HPI: Matthew Gray is a 52 y.o. female who presents with Knee Pain (Right)  Agnes Amor is a new patient who presents today with recurrent knee effusion as well as pain in her R knee. She reports this began over 20 years ago, and she historically has been able to treat the effusion and pain conservatively. On April 11th, 2023 her knee began to swell and she was unable to get the swelling down. She went to her PCP, who referred her to orthopedics as well as a Rheumatoid Arthritis Specialist. She reports her pain is medially, as well as inferior to the patella. She does note a reduced range of motion. 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 She denies any instability. She was wearing a copper knee sleeve, however it is not offering her any more relief. She states over the counters are not helping. ROS/Meds/PSH/PMH/FH/SH: I personally reviewed the patients standard intake form. Below are the pertinents    Tobacco:  reports that she has never smoked. She has never used smokeless tobacco.  Diabetes: none  Other: Hypothyroidism, ADHD, anxiety, depression    Physical Examination:  General: no acute distress  Lungs: breathing easily  CV: regular rhythm by pulse  Right Knee: Minimal effusion present. Tenderness palpation of the medial hamstring and pes anserine bursa. Full active and passive range of motion with no pain in the extreme. Negative ligamentous exam x4. Negative Sourav's. Positive bounce home test.      Imaging:   I reviewed 3 views of the knee performed in our system on 4/17/2023 which shows no acute fracture, dislocation or advanced degenerative changes there is some mild osteophyte formation in the medial compartment    All imaging interpreted by myself Mark Weaver MD

## 2023-05-22 ENCOUNTER — TELEPHONE (OUTPATIENT)
Dept: FAMILY MEDICINE CLINIC | Facility: CLINIC | Age: 48
End: 2023-05-22

## 2023-06-19 DIAGNOSIS — B37.9 CANDIDIASIS, UNSPECIFIED: ICD-10-CM

## 2023-06-19 RX ORDER — FLUCONAZOLE 150 MG/1
TABLET ORAL
Qty: 2 TABLET | Refills: 2 | Status: SHIPPED | OUTPATIENT
Start: 2023-06-19

## 2023-06-28 ENCOUNTER — OFFICE VISIT (OUTPATIENT)
Dept: FAMILY MEDICINE CLINIC | Facility: CLINIC | Age: 48
End: 2023-06-28
Payer: COMMERCIAL

## 2023-06-28 VITALS
SYSTOLIC BLOOD PRESSURE: 118 MMHG | BODY MASS INDEX: 34.32 KG/M2 | TEMPERATURE: 98 F | WEIGHT: 206 LBS | OXYGEN SATURATION: 98 % | HEART RATE: 71 BPM | HEIGHT: 65 IN | RESPIRATION RATE: 18 BRPM | DIASTOLIC BLOOD PRESSURE: 78 MMHG

## 2023-06-28 DIAGNOSIS — F90.2 ATTENTION DEFICIT HYPERACTIVITY DISORDER (ADHD), COMBINED TYPE: Primary | ICD-10-CM

## 2023-06-28 DIAGNOSIS — F51.04 CHRONIC INSOMNIA: ICD-10-CM

## 2023-06-28 DIAGNOSIS — R63.5 EXCESSIVE BODY WEIGHT GAIN: ICD-10-CM

## 2023-06-28 PROCEDURE — 99214 OFFICE O/P EST MOD 30 MIN: CPT | Performed by: NURSE PRACTITIONER

## 2023-06-28 RX ORDER — DEXTROAMPHETAMINE SACCHARATE, AMPHETAMINE ASPARTATE MONOHYDRATE, DEXTROAMPHETAMINE SULFATE AND AMPHETAMINE SULFATE 1.25; 1.25; 1.25; 1.25 MG/1; MG/1; MG/1; MG/1
5 CAPSULE, EXTENDED RELEASE ORAL DAILY
Qty: 30 CAPSULE | Refills: 0 | Status: SHIPPED | OUTPATIENT
Start: 2023-08-27 | End: 2023-09-26

## 2023-06-28 RX ORDER — DEXTROAMPHETAMINE SACCHARATE, AMPHETAMINE ASPARTATE MONOHYDRATE, DEXTROAMPHETAMINE SULFATE AND AMPHETAMINE SULFATE 1.25; 1.25; 1.25; 1.25 MG/1; MG/1; MG/1; MG/1
5 CAPSULE, EXTENDED RELEASE ORAL DAILY
Qty: 30 CAPSULE | Refills: 0 | Status: SHIPPED | OUTPATIENT
Start: 2023-06-28 | End: 2023-07-28

## 2023-06-28 RX ORDER — DEXTROAMPHETAMINE SACCHARATE, AMPHETAMINE ASPARTATE MONOHYDRATE, DEXTROAMPHETAMINE SULFATE AND AMPHETAMINE SULFATE 1.25; 1.25; 1.25; 1.25 MG/1; MG/1; MG/1; MG/1
5 CAPSULE, EXTENDED RELEASE ORAL DAILY
Qty: 30 CAPSULE | Refills: 0 | Status: SHIPPED | OUTPATIENT
Start: 2023-07-28 | End: 2023-08-27

## 2023-06-28 RX ORDER — BUPROPION HYDROCHLORIDE 150 MG/1
150 TABLET ORAL EVERY MORNING
Qty: 30 TABLET | Refills: 3 | Status: SHIPPED | OUTPATIENT
Start: 2023-06-28

## 2023-06-28 RX ORDER — ZOLPIDEM TARTRATE 5 MG/1
5 TABLET ORAL NIGHTLY PRN
Qty: 90 TABLET | Refills: 0 | Status: SHIPPED | OUTPATIENT
Start: 2023-06-28 | End: 2023-09-26

## 2023-06-28 RX ORDER — ZOLPIDEM TARTRATE 5 MG/1
5 TABLET ORAL NIGHTLY PRN
COMMUNITY

## 2023-06-28 SDOH — ECONOMIC STABILITY: INCOME INSECURITY: HOW HARD IS IT FOR YOU TO PAY FOR THE VERY BASICS LIKE FOOD, HOUSING, MEDICAL CARE, AND HEATING?: NOT HARD AT ALL

## 2023-06-28 SDOH — ECONOMIC STABILITY: FOOD INSECURITY: WITHIN THE PAST 12 MONTHS, THE FOOD YOU BOUGHT JUST DIDN'T LAST AND YOU DIDN'T HAVE MONEY TO GET MORE.: NEVER TRUE

## 2023-06-28 SDOH — ECONOMIC STABILITY: FOOD INSECURITY: WITHIN THE PAST 12 MONTHS, YOU WORRIED THAT YOUR FOOD WOULD RUN OUT BEFORE YOU GOT MONEY TO BUY MORE.: NEVER TRUE

## 2023-06-28 ASSESSMENT — ENCOUNTER SYMPTOMS
COUGH: 0
VOMITING: 0
CONSTIPATION: 0
DIARRHEA: 0
ABDOMINAL PAIN: 0
WHEEZING: 0
EYE DISCHARGE: 0
CHEST TIGHTNESS: 0
RHINORRHEA: 0
BLOOD IN STOOL: 0
SHORTNESS OF BREATH: 0
EYE PAIN: 0

## 2023-06-28 ASSESSMENT — PATIENT HEALTH QUESTIONNAIRE - PHQ9
2. FEELING DOWN, DEPRESSED OR HOPELESS: 0
SUM OF ALL RESPONSES TO PHQ QUESTIONS 1-9: 0
SUM OF ALL RESPONSES TO PHQ9 QUESTIONS 1 & 2: 0
1. LITTLE INTEREST OR PLEASURE IN DOING THINGS: 0

## 2023-09-07 NOTE — PROGRESS NOTES
2020      RE: Ene Mcqueen      To Whom it May Concern:      Please accept this letter as confirmation that Ene Mcqueen has been at the hospital since 20. Very sadly, Mckenzie experienced the loss of her baby boy on 20 via . Please extend Mckenzie and her family the much needed support and assistance of bereavement leave during this time. Feel free to contact my office if you have any questions or concerns. Thank you for your assistance in this matter.     Sincerely,        BI Cortez MD  Medical Social Worker     OB/GYN  [phone] Wife of Dr. Gume Rodriguez referred for a new diagnosis of AUDELIA. I have reviewed this patient's lab results and see a new anemia with classic iron deficiency which is unexplained. She may need endoscopy to rule out a source of chronic GI blood loss. I will plan a clinic visit next week with her to review     Dov Michele MD

## 2023-09-27 ENCOUNTER — OFFICE VISIT (OUTPATIENT)
Dept: FAMILY MEDICINE CLINIC | Facility: CLINIC | Age: 48
End: 2023-09-27
Payer: COMMERCIAL

## 2023-09-27 ENCOUNTER — HOSPITAL ENCOUNTER (OUTPATIENT)
Dept: GENERAL RADIOLOGY | Age: 48
Discharge: HOME | End: 2023-09-29
Payer: COMMERCIAL

## 2023-09-27 VITALS
DIASTOLIC BLOOD PRESSURE: 76 MMHG | WEIGHT: 205 LBS | HEART RATE: 57 BPM | OXYGEN SATURATION: 99 % | RESPIRATION RATE: 18 BRPM | BODY MASS INDEX: 34.16 KG/M2 | HEIGHT: 65 IN | SYSTOLIC BLOOD PRESSURE: 118 MMHG | TEMPERATURE: 97 F

## 2023-09-27 DIAGNOSIS — R76.8 POSITIVE ANA (ANTINUCLEAR ANTIBODY): ICD-10-CM

## 2023-09-27 DIAGNOSIS — F90.2 ADHD (ATTENTION DEFICIT HYPERACTIVITY DISORDER), COMBINED TYPE: ICD-10-CM

## 2023-09-27 DIAGNOSIS — M25.531 RIGHT WRIST PAIN: ICD-10-CM

## 2023-09-27 DIAGNOSIS — M54.10 RADICULOPATHY AFFECTING UPPER EXTREMITY: ICD-10-CM

## 2023-09-27 DIAGNOSIS — F33.42 RECURRENT MAJOR DEPRESSIVE DISORDER, IN FULL REMISSION (HCC): ICD-10-CM

## 2023-09-27 DIAGNOSIS — M79.10 MYALGIA: ICD-10-CM

## 2023-09-27 DIAGNOSIS — M54.10 RADICULOPATHY AFFECTING UPPER EXTREMITY: Primary | ICD-10-CM

## 2023-09-27 DIAGNOSIS — F51.01 PRIMARY INSOMNIA: ICD-10-CM

## 2023-09-27 PROBLEM — F32.0 MAJOR DEPRESSIVE DISORDER, SINGLE EPISODE, MILD (HCC): Status: ACTIVE | Noted: 2023-09-27

## 2023-09-27 PROBLEM — F33.1 MAJOR DEPRESSIVE DISORDER, RECURRENT, MODERATE (HCC): Status: ACTIVE | Noted: 2023-09-27

## 2023-09-27 PROBLEM — F33.9 MAJOR DEPRESSIVE DISORDER, RECURRENT, UNSPECIFIED (HCC): Status: ACTIVE | Noted: 2023-09-27

## 2023-09-27 PROBLEM — F33.0 MAJOR DEPRESSIVE DISORDER, RECURRENT, MILD (HCC): Status: ACTIVE | Noted: 2023-09-27

## 2023-09-27 PROCEDURE — 99214 OFFICE O/P EST MOD 30 MIN: CPT | Performed by: NURSE PRACTITIONER

## 2023-09-27 PROCEDURE — 72040 X-RAY EXAM NECK SPINE 2-3 VW: CPT

## 2023-09-27 RX ORDER — DEXTROAMPHETAMINE SACCHARATE, AMPHETAMINE ASPARTATE MONOHYDRATE, DEXTROAMPHETAMINE SULFATE AND AMPHETAMINE SULFATE 1.25; 1.25; 1.25; 1.25 MG/1; MG/1; MG/1; MG/1
5 CAPSULE, EXTENDED RELEASE ORAL DAILY
Qty: 30 CAPSULE | Refills: 0 | Status: SHIPPED | OUTPATIENT
Start: 2023-10-27 | End: 2023-11-26

## 2023-09-27 RX ORDER — ZOLPIDEM TARTRATE 5 MG/1
5 TABLET ORAL NIGHTLY PRN
Qty: 90 TABLET | Refills: 0 | Status: SHIPPED | OUTPATIENT
Start: 2023-09-27 | End: 2023-12-26

## 2023-09-27 RX ORDER — DEXTROAMPHETAMINE SACCHARATE, AMPHETAMINE ASPARTATE MONOHYDRATE, DEXTROAMPHETAMINE SULFATE AND AMPHETAMINE SULFATE 1.25; 1.25; 1.25; 1.25 MG/1; MG/1; MG/1; MG/1
5 CAPSULE, EXTENDED RELEASE ORAL DAILY
Qty: 30 CAPSULE | Refills: 0 | Status: SHIPPED | OUTPATIENT
Start: 2023-09-27 | End: 2023-10-27

## 2023-09-27 RX ORDER — DEXTROAMPHETAMINE SACCHARATE, AMPHETAMINE ASPARTATE MONOHYDRATE, DEXTROAMPHETAMINE SULFATE AND AMPHETAMINE SULFATE 1.25; 1.25; 1.25; 1.25 MG/1; MG/1; MG/1; MG/1
5 CAPSULE, EXTENDED RELEASE ORAL DAILY
Qty: 30 CAPSULE | Refills: 0 | Status: SHIPPED | OUTPATIENT
Start: 2023-11-26 | End: 2023-12-26

## 2023-09-27 SDOH — ECONOMIC STABILITY: FOOD INSECURITY: WITHIN THE PAST 12 MONTHS, THE FOOD YOU BOUGHT JUST DIDN'T LAST AND YOU DIDN'T HAVE MONEY TO GET MORE.: NEVER TRUE

## 2023-09-27 SDOH — ECONOMIC STABILITY: INCOME INSECURITY: HOW HARD IS IT FOR YOU TO PAY FOR THE VERY BASICS LIKE FOOD, HOUSING, MEDICAL CARE, AND HEATING?: NOT HARD AT ALL

## 2023-09-27 SDOH — ECONOMIC STABILITY: FOOD INSECURITY: WITHIN THE PAST 12 MONTHS, YOU WORRIED THAT YOUR FOOD WOULD RUN OUT BEFORE YOU GOT MONEY TO BUY MORE.: NEVER TRUE

## 2023-09-27 ASSESSMENT — ENCOUNTER SYMPTOMS
VOMITING: 0
CONSTIPATION: 0
DIARRHEA: 0
RHINORRHEA: 0
BLOOD IN STOOL: 0
WHEEZING: 0
EYE DISCHARGE: 0
CHEST TIGHTNESS: 0
EYE PAIN: 0
SHORTNESS OF BREATH: 0
HOARSE VOICE: 0
ABDOMINAL PAIN: 0
COUGH: 0

## 2023-09-27 ASSESSMENT — PATIENT HEALTH QUESTIONNAIRE - PHQ9
2. FEELING DOWN, DEPRESSED OR HOPELESS: 0
SUM OF ALL RESPONSES TO PHQ QUESTIONS 1-9: 0
1. LITTLE INTEREST OR PLEASURE IN DOING THINGS: 0
SUM OF ALL RESPONSES TO PHQ QUESTIONS 1-9: 0
SUM OF ALL RESPONSES TO PHQ9 QUESTIONS 1 & 2: 0

## 2023-10-05 ENCOUNTER — OFFICE VISIT (OUTPATIENT)
Dept: ORTHOPEDIC SURGERY | Age: 48
End: 2023-10-05

## 2023-10-05 DIAGNOSIS — M50.30 DDD (DEGENERATIVE DISC DISEASE), CERVICAL: ICD-10-CM

## 2023-10-05 DIAGNOSIS — M79.601 RIGHT ARM PAIN: Primary | ICD-10-CM

## 2023-10-05 DIAGNOSIS — M54.12 CERVICAL RADICULOPATHY: ICD-10-CM

## 2023-10-05 DIAGNOSIS — R29.898 RIGHT HAND WEAKNESS: Primary | ICD-10-CM

## 2023-10-05 RX ORDER — DICLOFENAC POTASSIUM 50 MG/1
50 TABLET, FILM COATED ORAL 3 TIMES DAILY PRN
Qty: 90 TABLET | Refills: 0 | Status: SHIPPED | OUTPATIENT
Start: 2023-10-05

## 2023-10-05 RX ORDER — GABAPENTIN 300 MG/1
300 CAPSULE ORAL NIGHTLY
Qty: 30 CAPSULE | Refills: 0 | Status: SHIPPED | OUTPATIENT
Start: 2023-10-05 | End: 2023-11-04

## 2023-10-05 NOTE — PROGRESS NOTES
steroids are other options to consider. I discussed potential surgical options including a discectomy and fusion if the symptoms fail to improve or there is a progressive neurologic deficit and conservative management has been exhausted. -NSAIDs:  The patient is agreeable to a trial of nonsteroidal anti-inflammatory drugs (NSAIDs). We discussed risks associated with their use, including GI tract or other bleeding, and also some cardiac risk. Instructions were given to discontinue the NSIAD if there is any sign of GI bleed, chest pain, or shortness of breath. A regimen of cataflam was prescribed. Continued use of NSAIDS is recommended to be managed by PCP in order to monitor kidney and liver function.  - Gabapentin. The patient was prescribed an initial regimen of gabapentin. The medication nay need to be titrated up to a therapeutic level. Side effects were discussed including dizziness, ataxia, drowsiness, and nystagmus, or blurred vision. -MRI: A cervical MRI was ordered to confirm the diagnosis and assess the severity. Further deliniation of anatomy will allow provider to recommend further appropriate treatment plan including cervical steroid injections vs. surgery. Orders Placed This Encounter   Medications    gabapentin (NEURONTIN) 300 MG capsule     Sig: Take 1 capsule by mouth nightly for 30 days. Intended supply: 30 days     Dispense:  30 capsule     Refill:  0    diclofenac (CATAFLAM) 50 MG tablet     Sig: Take 1 tablet by mouth 3 times daily as needed for Pain     Dispense:  90 tablet     Refill:  0        Orders Placed This Encounter   Procedures    MRI CERVICAL SPINE WO CONTRAST        4 This is a undiagnosed new problem with uncertain prognosis      Return for MRI results. TAYLOR Santiago CNP  10/06/23      Elements of this note were created using speech recognition software. As such, errors of speech recognition may be present.

## 2023-10-06 PROBLEM — M79.601 RIGHT ARM PAIN: Status: ACTIVE | Noted: 2023-10-06

## 2023-10-18 DIAGNOSIS — F40.240 CLAUSTROPHOBIA: Primary | ICD-10-CM

## 2023-10-18 RX ORDER — ALPRAZOLAM 0.5 MG/1
0.5 TABLET ORAL ONCE
Qty: 1 TABLET | Refills: 0 | Status: SHIPPED | OUTPATIENT
Start: 2023-10-18 | End: 2023-10-18

## 2023-10-31 ENCOUNTER — OFFICE VISIT (OUTPATIENT)
Dept: ORTHOPEDIC SURGERY | Age: 48
End: 2023-10-31
Payer: COMMERCIAL

## 2023-10-31 DIAGNOSIS — M48.02 FORAMINAL STENOSIS OF CERVICAL REGION: ICD-10-CM

## 2023-10-31 DIAGNOSIS — M50.30 DEGENERATIVE DISC DISEASE, CERVICAL: ICD-10-CM

## 2023-10-31 DIAGNOSIS — M54.12 CERVICAL RADICULOPATHY: Primary | ICD-10-CM

## 2023-10-31 PROCEDURE — 99214 OFFICE O/P EST MOD 30 MIN: CPT | Performed by: NURSE PRACTITIONER

## 2023-10-31 RX ORDER — TRAMADOL HYDROCHLORIDE 50 MG/1
50 TABLET ORAL EVERY 6 HOURS PRN
Qty: 28 TABLET | Refills: 0 | Status: SHIPPED | OUTPATIENT
Start: 2023-10-31 | End: 2023-11-07

## 2023-10-31 NOTE — PROGRESS NOTES
visualized lung apices are grossly clear. DISCS:Mild disc height loss at C5-C6 and C6-C7. DEGENERATIVE:    C2-C3: No canal or foraminal narrowing. C3-C4: Mild posterior endplate hyperostosis with shallow intervening disc bulge. No spinal canal stenosis. Mild bilateral uncovertebral joint and facet joint  arthropathy without significant neural foraminal narrowing. C4-C5: Mild posterior endplate hyperostosis with shallow intervening disc bulge  with tiny superimposed central disc protrusion. No spinal canal stenosis. Mild  bilateral uncovertebral joint and facet joint arthropathy without significant  neural foraminal narrowing. C5-C6: Mild posterior endplate hyperostosis with intervening disc bulge. Mild  spinal canal stenosis. Bilateral uncovertebral joint and facet joint  arthropathy. Moderate to severe left and mild to moderate right neural foraminal  narrowing. C6-C7: Posterior endplate hyperostosis with intervening disc bulge, with  superimposed left central disc protrusion. Mild spinal canal stenosis. Bilateral  uncovertebral joint and facet joint arthropathy. No significant neural foraminal  narrowing. C7-T1: Mild posterior endplate hyperostosis with intervening disc bulge. No  spinal canal stenosis. Bilateral uncovertebral joint and facet joint  arthropathy. Severe right and moderate to severe left neural foraminal  narrowing. Impression  1. Multilevel lumbar spondylosis, as detailed above. 2.  At C5-C6, there is mild spinal canal stenosis with moderate to severe left  and mild-to-moderate right neural foraminal narrowing. 3.  At C6-C7, there is mild spinal canal stenosis. 4.  At C7-T1, there is severe right and moderate-to-severe left neural foraminal  narrowing. Assessment/Plan:        ICD-10-CM    1. Cervical radiculopathy  M54.12 traMADol (ULTRAM) 50 MG tablet     Amb External Referral To Physical Therapy     Amb External Referral To Pain Medicine      2.  Degenerative

## 2023-11-09 RX ORDER — DICLOFENAC POTASSIUM 50 MG/1
TABLET, FILM COATED ORAL
Qty: 90 TABLET | Refills: 0 | OUTPATIENT
Start: 2023-11-09

## 2023-11-09 RX ORDER — GABAPENTIN 300 MG/1
300 CAPSULE ORAL NIGHTLY
Qty: 30 CAPSULE | Refills: 0 | OUTPATIENT
Start: 2023-11-09 | End: 2023-12-09

## 2023-11-14 ENCOUNTER — CLINICAL DOCUMENTATION (OUTPATIENT)
Dept: ORTHOPEDIC SURGERY | Age: 48
End: 2023-11-14

## 2023-11-14 NOTE — PROGRESS NOTES
Patient referred to [facility] for pain mgmt. Scheduled with Juana Renteria for 11/13.  Patient cx appointment

## 2023-12-05 ENCOUNTER — OFFICE VISIT (OUTPATIENT)
Dept: OBGYN CLINIC | Age: 48
End: 2023-12-05
Payer: COMMERCIAL

## 2023-12-05 VITALS
HEIGHT: 65 IN | SYSTOLIC BLOOD PRESSURE: 122 MMHG | BODY MASS INDEX: 33.66 KG/M2 | WEIGHT: 202 LBS | DIASTOLIC BLOOD PRESSURE: 90 MMHG

## 2023-12-05 DIAGNOSIS — N94.9 VAGINAL DISCOMFORT: Primary | ICD-10-CM

## 2023-12-05 DIAGNOSIS — R10.2 PELVIC PAIN: ICD-10-CM

## 2023-12-05 DIAGNOSIS — N89.8 VAGINAL DISCHARGE: ICD-10-CM

## 2023-12-05 DIAGNOSIS — N89.8 VAGINAL IRRITATION: ICD-10-CM

## 2023-12-05 DIAGNOSIS — Z12.4 ROUTINE CERVICAL SMEAR: ICD-10-CM

## 2023-12-05 LAB — WET PREP (POC): NORMAL

## 2023-12-05 PROCEDURE — 87210 SMEAR WET MOUNT SALINE/INK: CPT | Performed by: OBSTETRICS & GYNECOLOGY

## 2023-12-05 PROCEDURE — 99214 OFFICE O/P EST MOD 30 MIN: CPT | Performed by: OBSTETRICS & GYNECOLOGY

## 2023-12-05 RX ORDER — CLOBETASOL PROPIONATE 0.5 MG/G
CREAM TOPICAL
Qty: 60 G | Refills: 3 | Status: SHIPPED | OUTPATIENT
Start: 2023-12-05

## 2023-12-05 NOTE — PROGRESS NOTES
MERVIN Ramos is a 50 y.o. female seen for pelvic pain and external vaginal irritation. The pain is lower midline pain but doesn't happen all the time. She doesn't have any discharge but just has irritation at times externally    Past Medical History, Past Surgical History, Family history, Social History, and Medications were all reviewed with the patient today and updated as necessary. Current Outpatient Medications   Medication Sig    clobetasol (TEMOVATE) 0.05 % cream Apply topically 3 times daily. diclofenac (CATAFLAM) 50 MG tablet Take 1 tablet by mouth 3 times daily as needed for Pain    amphetamine-dextroamphetamine (ADDERALL XR) 5 MG extended release capsule Take 1 capsule by mouth daily for 30 days. Max Daily Amount: 5 mg    zolpidem (AMBIEN) 5 MG tablet Take 1 tablet by mouth nightly as needed for Sleep for up to 90 days. Max Daily Amount: 5 mg    diclofenac (VOLTAREN) 75 MG EC tablet Take 1 tablet by mouth 2 times daily    gabapentin (NEURONTIN) 300 MG capsule Take 1 capsule by mouth nightly for 30 days. Intended supply: 30 days (Patient not taking: Reported on 12/5/2023)    amphetamine-dextroamphetamine (ADDERALL XR) 5 MG extended release capsule Take 1 capsule by mouth daily for 30 days. Max Daily Amount: 5 mg    amphetamine-dextroamphetamine (ADDERALL XR) 5 MG extended release capsule Take 1 capsule by mouth daily for 30 days. Max Daily Amount: 5 mg    buPROPion (WELLBUTRIN XL) 150 MG extended release tablet Take 1 tablet by mouth every morning (Patient not taking: Reported on 12/5/2023)    amphetamine-dextroamphetamine (ADDERALL XR) 5 MG extended release capsule Take 1 capsule by mouth daily for 30 days. Max Daily Amount: 5 mg    amphetamine-dextroamphetamine (ADDERALL XR) 5 MG extended release capsule Take 1 capsule by mouth daily for 30 days.  Max Daily Amount: 5 mg    amphetamine-dextroamphetamine (ADDERALL XR) 5 MG extended release capsule Take 1 capsule by mouth daily

## 2023-12-08 LAB
C TRACH RRNA CVX QL NAA+PROBE: NEGATIVE
COLLECTION METHOD: NORMAL
CYTOLOGIST CVX/VAG CYTO: NORMAL
CYTOLOGY CVX/VAG DOC THIN PREP: NORMAL
DATE OF LMP: NORMAL
HPV REFLEX: NORMAL
Lab: NORMAL
Lab: NORMAL
N GONORRHOEA RRNA CVX QL NAA+PROBE: NEGATIVE
PAP SOURCE: NORMAL
PATH REPORT.FINAL DX SPEC: NORMAL
PREV TREATMENT: NORMAL
STAT OF ADQ CVX/VAG CYTO-IMP: NORMAL

## 2024-01-02 NOTE — PROGRESS NOTES
Component      Latest Ref Rng & Units 7/26/2017 4/12/2018 7/26/2018   Hemoglobin A1C      0 - 5.6 % 8.5 (H) 9.0 (H) 7.7 (H)      IgM; Future  -     Rheumatoid Factor, Qt; Future  -     Thyroid Peroxidase Antibody; Future  -     Protein / creatinine ratio, urine; Future  -     Lyme Disease Total Antibody With Reflex to Immunoassay; Future  2. SS-A antibody positive  -     Anti-Nuclear Ab by IFA; Future  -     Sjogren's Ab (SS-A, SS-B); Future  -     Lupus Anticoagulant Panel; Future  -     C-Reactive Protein; Future  -     Sedimentation Rate; Future  -     C3 Complement; Future  -     C4 Complement; Future  -     Cryoglobulin, Qualitative, with Reflex; Future  -     Urinalysis; Future  -     MARCO ANTONIO and PE, Serum; Future  -     IgG, IgA, IgM; Future  -     Rheumatoid Factor, Qt; Future  -     Thyroid Peroxidase Antibody; Future  -     Protein / creatinine ratio, urine; Future  -     Lyme Disease Total Antibody With Reflex to Immunoassay; Future  3. Arthritis of both knees  Overview:  Recurrent cool large joint effusions R>L knee. No preceding injury. No h/o EM rash, tick exposure.    XR R knee 4/2023   mild tricompartmental degenerative changes are seen mainly consisting of small marginal osteophytes. Joint effusion noted.     Exam w/o warmth or redness. Almost full ROM b/l. Could be secondary to OA vs internal derangement less likely from inflammatory arthritis or lyme arthritis.     Plan:  -check labs and XR L knee  -start PT for bilateral knee pain  -start diclofenac 50mg BID for 2 weeks then prn   -if pain persists will check MRI R knee to evaluate for internal derangement    Advised patient of side effects including hypertension, increased cardiovascular risk of MI, heart failure, stroke, renal & liver toxicity, GI upset and bleeding.    Orders:  -     Anti-Nuclear Ab by IFA; Future  -     Sjogren's Ab (SS-A, SS-B); Future  -     Lupus Anticoagulant Panel; Future  -     C-Reactive Protein; Future  -     Sedimentation Rate; Future  -     C3 Complement; Future  -     C4 Complement; Future  -     Cryoglobulin, Qualitative, with Reflex; Future  -

## 2024-01-03 ENCOUNTER — OFFICE VISIT (OUTPATIENT)
Dept: RHEUMATOLOGY | Age: 49
End: 2024-01-03
Payer: COMMERCIAL

## 2024-01-03 ENCOUNTER — HOSPITAL ENCOUNTER (OUTPATIENT)
Dept: GENERAL RADIOLOGY | Age: 49
Discharge: HOME | End: 2024-01-06
Payer: COMMERCIAL

## 2024-01-03 VITALS
HEART RATE: 83 BPM | TEMPERATURE: 97.9 F | BODY MASS INDEX: 33.99 KG/M2 | WEIGHT: 204 LBS | SYSTOLIC BLOOD PRESSURE: 120 MMHG | HEIGHT: 65 IN | DIASTOLIC BLOOD PRESSURE: 80 MMHG

## 2024-01-03 DIAGNOSIS — Z87.59 H/O PRE-ECLAMPSIA: ICD-10-CM

## 2024-01-03 DIAGNOSIS — M17.0 ARTHRITIS OF BOTH KNEES: ICD-10-CM

## 2024-01-03 DIAGNOSIS — J34.9 SINUS PROBLEM: ICD-10-CM

## 2024-01-03 DIAGNOSIS — R07.89 CHEST PRESSURE: ICD-10-CM

## 2024-01-03 DIAGNOSIS — R76.8 POSITIVE ANA (ANTINUCLEAR ANTIBODY): ICD-10-CM

## 2024-01-03 DIAGNOSIS — R76.8 SS-A ANTIBODY POSITIVE: ICD-10-CM

## 2024-01-03 DIAGNOSIS — R76.8 POSITIVE ANA (ANTINUCLEAR ANTIBODY): Primary | ICD-10-CM

## 2024-01-03 LAB
APPEARANCE UR: CLEAR
BILIRUB UR QL: NEGATIVE
COLOR UR: NORMAL
CREAT UR-MCNC: 153 MG/DL
CRP SERPL-MCNC: 0.7 MG/DL (ref 0–0.9)
ERYTHROCYTE [SEDIMENTATION RATE] IN BLOOD: 8 MM/HR (ref 0–20)
GLUCOSE UR STRIP.AUTO-MCNC: NEGATIVE MG/DL
HGB UR QL STRIP: NEGATIVE
KETONES UR QL STRIP.AUTO: NEGATIVE MG/DL
LEUKOCYTE ESTERASE UR QL STRIP.AUTO: NEGATIVE
NITRITE UR QL STRIP.AUTO: NEGATIVE
PH UR STRIP: 7 (ref 5–9)
PROT UR STRIP-MCNC: NEGATIVE MG/DL
PROT UR-MCNC: 9 MG/DL
PROT/CREAT UR-RTO: 0.1
SP GR UR REFRACTOMETRY: 1.02 (ref 1–1.02)
UROBILINOGEN UR QL STRIP.AUTO: 0.2 EU/DL (ref 0.2–1)

## 2024-01-03 PROCEDURE — 99244 OFF/OP CNSLTJ NEW/EST MOD 40: CPT | Performed by: INTERNAL MEDICINE

## 2024-01-03 PROCEDURE — 73564 X-RAY EXAM KNEE 4 OR MORE: CPT

## 2024-01-03 RX ORDER — CETIRIZINE HYDROCHLORIDE 5 MG/1
5 TABLET ORAL DAILY
COMMUNITY

## 2024-01-03 RX ORDER — ZOLPIDEM TARTRATE 5 MG/1
5 TABLET ORAL NIGHTLY PRN
COMMUNITY

## 2024-01-03 ASSESSMENT — ROUTINE ASSESSMENT OF PATIENT INDEX DATA (RAPID3)
ON A SCALE OF ONE TO TEN, HOW MUCH PAIN HAVE YOU HAD BECAUSE OF YOUR CONDITION OVER THE PAST WEEK?: 4
WHEN YOU AWAKENED IN THE MORNING OVER THE LAST WEEK, PLEASE INDICATE THE AMOUNT OF TIME IT TAKES UNTIL YOU ARE AS LIMBER AS YOU WILL BE FOR THE DAY: < 10 MIN
ON A SCALE OF ONE TO TEN, HOW DIFFICULT WAS IT FOR YOU TO COMPLETE THE LISTED DAILY PHYSICAL TASKS OVER THE LAST WEEK: 0.2
ON A SCALE OF ONE TO TEN, HOW MUCH OF A PROBLEM HAS UNUSUAL FATIGUE OR TIREDNESS BEEN FOR YOU OVER THE PAST WEEK?: 7
ON A SCALE OF ONE TO TEN, CONSIDERING ALL THE WAYS IN WHICH ILLNESS AND HEALTH CONDITIONS MAY AFFECT YOU AT THIS TIME, PLEASE INDICATE BELOW HOW YOU ARE DOING:: 4

## 2024-01-03 NOTE — PATIENT INSTRUCTIONS
Get lab work and x-ray done  Schedule physical therapy  Start diclofenac 50mg twice a day with food for 1-2 weeks, then as needed  Follow-up with ENT for sinus problems  Return in 2 weeks in person

## 2024-01-04 LAB
C3 SERPL-MCNC: 151 MG/DL (ref 82–167)
C4 SERPL-MCNC: 48 MG/DL (ref 12–38)
ENA SS-A AB SER-ACNC: 1.2 AI (ref 0–0.9)
ENA SS-B AB SER-ACNC: <0.2 AI (ref 0–0.9)
IGA SERPL-MCNC: 130 MG/DL (ref 87–352)
IGG SERPL-MCNC: 1323 MG/DL (ref 586–1602)
IGM SERPL-MCNC: 67 MG/DL (ref 26–217)
LYME ANTIBODY: NEGATIVE
RHEUMATOID FACT SERPL-ACNC: <10 IU/ML

## 2024-01-05 LAB — THYROPEROXIDASE AB SERPL-ACNC: 10 IU/ML (ref 0–34)

## 2024-01-07 LAB
ANA BY IFA: POSITIVE
Lab: ABNORMAL
SPECKLED PATTERN: ABNORMAL

## 2024-01-08 DIAGNOSIS — R76.8 POSITIVE ANA (ANTINUCLEAR ANTIBODY): Primary | ICD-10-CM

## 2024-01-08 DIAGNOSIS — M17.0 ARTHRITIS OF BOTH KNEES: ICD-10-CM

## 2024-01-08 DIAGNOSIS — R76.8 SS-A ANTIBODY POSITIVE: ICD-10-CM

## 2024-01-08 LAB
ALBUMIN SERPL ELPH-MCNC: 4 G/DL (ref 2.9–4.4)
ALBUMIN/GLOB SERPL: 1.3 (ref 0.7–1.7)
ALPHA1 GLOB SERPL ELPH-MCNC: 0.2 G/DL (ref 0–0.4)
ALPHA2 GLOB SERPL ELPH-MCNC: 0.6 G/DL (ref 0.4–1)
B-GLOBULIN SERPL ELPH-MCNC: 1.2 G/DL (ref 0.7–1.3)
CRYOGLOB SER QL 1D COLD INC: NORMAL
GAMMA GLOB SERPL ELPH-MCNC: 1.3 G/DL (ref 0.4–1.8)
GLOBULIN SER-MCNC: 3.3 G/DL (ref 2.2–3.9)
IGA SERPL-MCNC: 140 MG/DL (ref 87–352)
IGG SERPL-MCNC: 1402 MG/DL (ref 586–1602)
IGM SERPL-MCNC: 71 MG/DL (ref 26–217)
INTERPRETATION SERPL IEP-IMP: NORMAL
M PROTEIN SERPL ELPH-MCNC: NORMAL G/DL
PROT SERPL-MCNC: 7.3 G/DL (ref 6–8.5)

## 2024-01-10 ENCOUNTER — TELEPHONE (OUTPATIENT)
Dept: FAMILY MEDICINE CLINIC | Facility: CLINIC | Age: 49
End: 2024-01-10

## 2024-01-10 DIAGNOSIS — F51.04 CHRONIC INSOMNIA: Primary | ICD-10-CM

## 2024-01-10 RX ORDER — ZOLPIDEM TARTRATE 5 MG/1
5 TABLET ORAL NIGHTLY PRN
Qty: 30 TABLET | Refills: 0 | Status: SHIPPED | OUTPATIENT
Start: 2024-01-10 | End: 2024-01-22 | Stop reason: SDUPTHER

## 2024-01-10 NOTE — TELEPHONE ENCOUNTER
----- Message from Yudi Ponce sent at 1/10/2024  9:46 AM EST -----  Subject: Refill Request    QUESTIONS  Name of Medication? zolpidem (AMBIEN) 5 MG tablet  Patient-reported dosage and instructions? Take 1 tablet by mouth nightly   as needed for Sleep for up to 90 days. Max Daily Amount? 5 mg  How many days do you have left? 0  Preferred Pharmacy? [facility] #[id number]  Pharmacy phone number (if available)? [phone]  Additional Information for Provider? Patient is scheduled for OV on   1/22/2024. Please Text patient. Thank you   ---------------------------------------------------------------------------  --------------  CALL BACK INFO  What is the best way for the office to contact you? Do not leave any   message, patient will call back for answer  Preferred Call Back Phone Number? [phone]  ---------------------------------------------------------------------------  --------------  SCRIPT ANSWERS  Relationship to Patient? Self

## 2024-01-11 LAB
APTT HEX PL PPP: 5 SEC
APTT IMM NP PPP: NORMAL SEC
APTT PPP 1:1 SALINE: NORMAL SEC
APTT PPP: 26.5 SEC
B2 GLYCOPROT1 IGA SER-ACNC: <10 SAU
B2 GLYCOPROT1 IGG SER-ACNC: <10 SGU
B2 GLYCOPROT1 IGM SER-ACNC: <10 SMU
CARDIOLIPIN IGG SER IA-ACNC: <10 GPL
CARDIOLIPIN IGM SER IA-ACNC: <10 MPL
CONFIRM APTT: 0.6 SEC
CONFIRM DRVVT: NORMAL SEC
INR PPP: 0.9 RATIO
LABORATORY COMMENT REPORT: NORMAL
PROTHROMBIN TIME: 9.9 SEC
SCREEN DRVVT/NORMAL: NORMAL RATIO
SCREEN DRVVT: 40.2 SEC
THROMBIN TIME: 16 SEC

## 2024-01-18 ENCOUNTER — OFFICE VISIT (OUTPATIENT)
Dept: RHEUMATOLOGY | Age: 49
End: 2024-01-18
Payer: COMMERCIAL

## 2024-01-18 VITALS
TEMPERATURE: 98.5 F | DIASTOLIC BLOOD PRESSURE: 78 MMHG | BODY MASS INDEX: 34.32 KG/M2 | SYSTOLIC BLOOD PRESSURE: 133 MMHG | HEART RATE: 77 BPM | HEIGHT: 65 IN | WEIGHT: 206 LBS

## 2024-01-18 DIAGNOSIS — J34.9 SINUS PROBLEM: ICD-10-CM

## 2024-01-18 DIAGNOSIS — M79.601 RIGHT ARM PAIN: ICD-10-CM

## 2024-01-18 DIAGNOSIS — M25.561 CHRONIC PAIN OF RIGHT KNEE: ICD-10-CM

## 2024-01-18 DIAGNOSIS — M17.0 ARTHRITIS OF BOTH KNEES: Primary | ICD-10-CM

## 2024-01-18 DIAGNOSIS — R60.9 SWELLING: ICD-10-CM

## 2024-01-18 DIAGNOSIS — R76.8 POSITIVE ANA (ANTINUCLEAR ANTIBODY): ICD-10-CM

## 2024-01-18 DIAGNOSIS — G89.29 CHRONIC PAIN OF RIGHT KNEE: ICD-10-CM

## 2024-01-18 PROCEDURE — 99214 OFFICE O/P EST MOD 30 MIN: CPT | Performed by: INTERNAL MEDICINE

## 2024-01-18 ASSESSMENT — ROUTINE ASSESSMENT OF PATIENT INDEX DATA (RAPID3)
WHEN YOU AWAKENED IN THE MORNING OVER THE LAST WEEK, PLEASE INDICATE THE AMOUNT OF TIME IT TAKES UNTIL YOU ARE AS LIMBER AS YOU WILL BE FOR THE DAY: < 10 MIN
ON A SCALE OF ONE TO TEN, HOW MUCH PAIN HAVE YOU HAD BECAUSE OF YOUR CONDITION OVER THE PAST WEEK?: 7
ON A SCALE OF ONE TO TEN, HOW MUCH OF A PROBLEM HAS UNUSUAL FATIGUE OR TIREDNESS BEEN FOR YOU OVER THE PAST WEEK?: 5
ON A SCALE OF ONE TO TEN, HOW DIFFICULT WAS IT FOR YOU TO COMPLETE THE LISTED DAILY PHYSICAL TASKS OVER THE LAST WEEK: 0.1
ON A SCALE OF ONE TO TEN, CONSIDERING ALL THE WAYS IN WHICH ILLNESS AND HEALTH CONDITIONS MAY AFFECT YOU AT THIS TIME, PLEASE INDICATE BELOW HOW YOU ARE DOING:: 6

## 2024-01-18 NOTE — PATIENT INSTRUCTIONS
Unclear cause of right knee pain, getting MRI of the right knee to figure out what's happening   Schedule allergy/immunology appointment to evaluate sinus problems   Not fully diagnosed sjogren's disease, some early features of this.   Return for f/u in 2 months

## 2024-01-18 NOTE — PROGRESS NOTES
[facility]  Franky Payne D.O.  [address] , [address]   [place], [place]  Office : [phone], Fax: [phone]     RHEUMATOLOGY OFFICE VISIT NOTE  Date of Visit:  2024 4:26 PM    Patient Information:  Name:  Liz De La Rosa  :  1975  Age:  48 y.o.   Gender:  female      Ms. De La Rosa is here today for follow-up of   +BRADLEY 1: 160 speckled, +low titer SSA 1.2   -Manifestations: dry mouth, bilateral knee pain/swelling  -Neg/nrl: SSB, Lupus anticoagulant panel, CRP, ESR, C3, C4, UA, UPCR, SPEP no M spike, IgG/IgM/IgA, RF, TPO ab, lyme   -XR L knees 2024: Prominent tibial plateau spine. Small bony spurrings at the posterior aspects of the patella and at the medial tibiofemoral joint. Mild soft tissue swelling.  -XR R knee 2023: mild tricompartmental degenerative changes are seen mainly consisting of small marginal osteophytes. Joint effusion noted.     Last visit: 2024 seen in consult for above. BRADLEY checked in the setting of elevated CRP 5mg/L. D/w patient that this is non-specific. Also had b/l knee pain and swelling no inflammatory symptoms/features besides swelling felt to be 2/2 to OA vs internal derangement. Noted to have mild tricompartmental OA changes Further workup performed above and was non-revealing except for mild OA of the L knee. Repeat CRP was normal.      History of Present Illness:    Since the last visit, patient is feeling \"fair\".    Pain level: 7 /10  Location:  knee , wrist   Wrist pain, previously told it was tendonitis and CTS  Feels very sore, okay until picking up something heavy, no N/T. Lounged too long this weekend and felt the whole body was hurting all over. If she has an active night at work she wakes up and it's sore. Not enough to do anything. Only related to overuse. Around the entire wrists. Pain lasts comes and goes. Be here for a few weeks then leave.   Last night constant heavy lifting at work with

## 2024-01-22 ENCOUNTER — OFFICE VISIT (OUTPATIENT)
Dept: FAMILY MEDICINE CLINIC | Facility: CLINIC | Age: 49
End: 2024-01-22
Payer: COMMERCIAL

## 2024-01-22 VITALS
DIASTOLIC BLOOD PRESSURE: 84 MMHG | OXYGEN SATURATION: 99 % | TEMPERATURE: 98.1 F | BODY MASS INDEX: 33.99 KG/M2 | HEIGHT: 65 IN | SYSTOLIC BLOOD PRESSURE: 126 MMHG | WEIGHT: 204 LBS | HEART RATE: 82 BPM | RESPIRATION RATE: 18 BRPM

## 2024-01-22 DIAGNOSIS — F51.04 CHRONIC INSOMNIA: ICD-10-CM

## 2024-01-22 DIAGNOSIS — J01.01 ACUTE RECURRENT MAXILLARY SINUSITIS: Primary | ICD-10-CM

## 2024-01-22 DIAGNOSIS — F33.42 RECURRENT MAJOR DEPRESSIVE DISORDER, IN FULL REMISSION (HCC): ICD-10-CM

## 2024-01-22 DIAGNOSIS — F90.2 ADHD (ATTENTION DEFICIT HYPERACTIVITY DISORDER), COMBINED TYPE: ICD-10-CM

## 2024-01-22 DIAGNOSIS — F33.1 MAJOR DEPRESSIVE DISORDER, RECURRENT, MODERATE (HCC): ICD-10-CM

## 2024-01-22 DIAGNOSIS — E66.09 CLASS 1 OBESITY DUE TO EXCESS CALORIES WITHOUT SERIOUS COMORBIDITY WITH BODY MASS INDEX (BMI) OF 33.0 TO 33.9 IN ADULT: ICD-10-CM

## 2024-01-22 PROCEDURE — 99214 OFFICE O/P EST MOD 30 MIN: CPT | Performed by: NURSE PRACTITIONER

## 2024-01-22 RX ORDER — DIETHYLPROPION HYDROCHLORIDE 75 MG/1
1 TABLET ORAL DAILY
Qty: 90 TABLET | Refills: 0 | Status: SHIPPED | OUTPATIENT
Start: 2024-01-22 | End: 2024-04-21

## 2024-01-22 RX ORDER — DOXYCYCLINE HYCLATE 100 MG
100 TABLET ORAL 2 TIMES DAILY
Qty: 20 TABLET | Refills: 0 | Status: SHIPPED | OUTPATIENT
Start: 2024-01-22 | End: 2024-02-01

## 2024-01-22 RX ORDER — ZOLPIDEM TARTRATE 5 MG/1
5 TABLET ORAL NIGHTLY PRN
Qty: 90 TABLET | Refills: 0 | Status: SHIPPED | OUTPATIENT
Start: 2024-01-22 | End: 2024-04-21

## 2024-01-22 RX ORDER — DEXTROAMPHETAMINE SACCHARATE, AMPHETAMINE ASPARTATE MONOHYDRATE, DEXTROAMPHETAMINE SULFATE AND AMPHETAMINE SULFATE 1.25; 1.25; 1.25; 1.25 MG/1; MG/1; MG/1; MG/1
5 CAPSULE, EXTENDED RELEASE ORAL DAILY
Qty: 30 CAPSULE | Refills: 0 | Status: SHIPPED | OUTPATIENT
Start: 2024-02-21 | End: 2024-03-22

## 2024-01-22 RX ORDER — DEXTROAMPHETAMINE SACCHARATE, AMPHETAMINE ASPARTATE MONOHYDRATE, DEXTROAMPHETAMINE SULFATE AND AMPHETAMINE SULFATE 1.25; 1.25; 1.25; 1.25 MG/1; MG/1; MG/1; MG/1
5 CAPSULE, EXTENDED RELEASE ORAL DAILY
Qty: 30 CAPSULE | Refills: 0 | Status: SHIPPED | OUTPATIENT
Start: 2024-03-22 | End: 2024-04-21

## 2024-01-22 RX ORDER — DEXTROAMPHETAMINE SACCHARATE, AMPHETAMINE ASPARTATE MONOHYDRATE, DEXTROAMPHETAMINE SULFATE AND AMPHETAMINE SULFATE 1.25; 1.25; 1.25; 1.25 MG/1; MG/1; MG/1; MG/1
5 CAPSULE, EXTENDED RELEASE ORAL DAILY
Qty: 30 CAPSULE | Refills: 0 | Status: SHIPPED | OUTPATIENT
Start: 2024-01-22 | End: 2024-02-21

## 2024-01-22 SDOH — ECONOMIC STABILITY: INCOME INSECURITY: HOW HARD IS IT FOR YOU TO PAY FOR THE VERY BASICS LIKE FOOD, HOUSING, MEDICAL CARE, AND HEATING?: NOT HARD AT ALL

## 2024-01-22 SDOH — ECONOMIC STABILITY: FOOD INSECURITY: WITHIN THE PAST 12 MONTHS, THE FOOD YOU BOUGHT JUST DIDN'T LAST AND YOU DIDN'T HAVE MONEY TO GET MORE.: NEVER TRUE

## 2024-01-22 SDOH — ECONOMIC STABILITY: FOOD INSECURITY: WITHIN THE PAST 12 MONTHS, YOU WORRIED THAT YOUR FOOD WOULD RUN OUT BEFORE YOU GOT MONEY TO BUY MORE.: NEVER TRUE

## 2024-01-22 ASSESSMENT — ENCOUNTER SYMPTOMS
ABDOMINAL PAIN: 0
BLOOD IN STOOL: 0
CHEST TIGHTNESS: 0
DIARRHEA: 0
CONSTIPATION: 0
EYE PAIN: 0
VOMITING: 0
WHEEZING: 0
EYE DISCHARGE: 0
SHORTNESS OF BREATH: 0

## 2024-01-22 ASSESSMENT — PATIENT HEALTH QUESTIONNAIRE - PHQ9
SUM OF ALL RESPONSES TO PHQ9 QUESTIONS 1 & 2: 0
SUM OF ALL RESPONSES TO PHQ QUESTIONS 1-9: 0
1. LITTLE INTEREST OR PLEASURE IN DOING THINGS: 0
2. FEELING DOWN, DEPRESSED OR HOPELESS: 0
SUM OF ALL RESPONSES TO PHQ QUESTIONS 1-9: 0

## 2024-01-22 NOTE — PROGRESS NOTES
XR) 5 MG extended release capsule Take 1 capsule by mouth daily for 30 days. Max Daily Amount: 5 mg     No current facility-administered medications for this visit.     Patient Active Problem List   Diagnosis    Antepartum placental abruption    Postpartum hypertension    H/O herpes simplex infection    Fetal demise, greater than 22 weeks, postpartum exam    LGSIL on Pap smear of cervix    Postpartum care and examination    Major depressive disorder, single episode, mild (HCC)    Major depressive disorder, recurrent, mild    Major depressive disorder, recurrent, moderate    Major depressive disorder, recurrent, unspecified    ADHD (attention deficit hyperactivity disorder), combined type    Right arm pain    Positive BRADLEY (antinuclear antibody)    H/O pre-eclampsia    Arthritis of both knees    Sinus problem    Chest pressure         Review of Systems   Constitutional:  Negative for fatigue and fever.   HENT:  Positive for congestion, ear pain, rhinorrhea, sinus pain and sneezing. Negative for hearing loss and postnasal drip.         Frontal pressure headaches.  Tenderness across her forehead.  Post nasal drip.     Eyes:  Negative for pain, discharge and visual disturbance.   Respiratory:  Positive for cough. Negative for chest tightness, shortness of breath and wheezing.    Cardiovascular:  Negative for chest pain, palpitations and leg swelling.   Gastrointestinal:  Negative for abdominal pain, blood in stool, constipation, diarrhea and vomiting.   Endocrine:        Wants to lose weight.  Has talked to friends who are on diethylpropion and this has worked for them.  Understands she cannot do another stimulant with Adderall.     Genitourinary:  Negative for difficulty urinating, frequency and urgency.   Musculoskeletal:  Negative for arthralgias, gait problem and myalgias.        Shoulder pain--getting better slowly.  Was seen by rheumatologist for her positive BRADLEY.  All her testing so far has been normal.     Skin:

## 2024-01-24 ASSESSMENT — ENCOUNTER SYMPTOMS
RHINORRHEA: 1
SINUS PAIN: 1
COUGH: 1

## 2024-02-05 ENCOUNTER — TELEPHONE (OUTPATIENT)
Dept: FAMILY MEDICINE CLINIC | Facility: CLINIC | Age: 49
End: 2024-02-05

## 2024-02-05 DIAGNOSIS — E66.09 CLASS 1 OBESITY DUE TO EXCESS CALORIES WITHOUT SERIOUS COMORBIDITY WITH BODY MASS INDEX (BMI) OF 33.0 TO 33.9 IN ADULT: ICD-10-CM

## 2024-02-05 RX ORDER — DIETHYLPROPION HYDROCHLORIDE 75 MG/1
1 TABLET ORAL DAILY
Qty: 90 TABLET | Refills: 0 | Status: SHIPPED | OUTPATIENT
Start: 2024-02-05 | End: 2024-05-05

## 2024-02-05 NOTE — TELEPHONE ENCOUNTER
Pt called and says [facility] [address] [facility]'t [facility] the new Diet pill you gave her but [facility] does have it can you send it there

## 2024-02-26 ENCOUNTER — OFFICE VISIT (OUTPATIENT)
Dept: FAMILY MEDICINE CLINIC | Facility: CLINIC | Age: 49
End: 2024-02-26
Payer: COMMERCIAL

## 2024-02-26 VITALS
HEIGHT: 65 IN | HEART RATE: 97 BPM | OXYGEN SATURATION: 98 % | BODY MASS INDEX: 33.32 KG/M2 | WEIGHT: 200 LBS | DIASTOLIC BLOOD PRESSURE: 78 MMHG | RESPIRATION RATE: 16 BRPM | TEMPERATURE: 97.9 F | SYSTOLIC BLOOD PRESSURE: 120 MMHG

## 2024-02-26 DIAGNOSIS — M54.12 CERVICAL NEUROPATHIC PAIN: Primary | ICD-10-CM

## 2024-02-26 DIAGNOSIS — M25.561 CHRONIC PAIN OF RIGHT KNEE: ICD-10-CM

## 2024-02-26 DIAGNOSIS — G89.29 CHRONIC PAIN OF RIGHT KNEE: ICD-10-CM

## 2024-02-26 PROCEDURE — 99214 OFFICE O/P EST MOD 30 MIN: CPT | Performed by: NURSE PRACTITIONER

## 2024-02-26 RX ORDER — PREGABALIN 25 MG/1
25 CAPSULE ORAL 2 TIMES DAILY
Qty: 180 CAPSULE | Refills: 0 | Status: SHIPPED | OUTPATIENT
Start: 2024-02-26 | End: 2024-05-26

## 2024-02-26 RX ORDER — ACETAMINOPHEN AND CODEINE PHOSPHATE 300; 30 MG/1; MG/1
1 TABLET ORAL 2 TIMES DAILY PRN
Qty: 30 TABLET | Refills: 0 | Status: SHIPPED | OUTPATIENT
Start: 2024-02-26 | End: 2024-03-27

## 2024-02-26 SDOH — ECONOMIC STABILITY: FOOD INSECURITY: WITHIN THE PAST 12 MONTHS, THE FOOD YOU BOUGHT JUST DIDN'T LAST AND YOU DIDN'T HAVE MONEY TO GET MORE.: NEVER TRUE

## 2024-02-26 SDOH — ECONOMIC STABILITY: FOOD INSECURITY: WITHIN THE PAST 12 MONTHS, YOU WORRIED THAT YOUR FOOD WOULD RUN OUT BEFORE YOU GOT MONEY TO BUY MORE.: NEVER TRUE

## 2024-02-26 SDOH — ECONOMIC STABILITY: INCOME INSECURITY: HOW HARD IS IT FOR YOU TO PAY FOR THE VERY BASICS LIKE FOOD, HOUSING, MEDICAL CARE, AND HEATING?: NOT HARD AT ALL

## 2024-02-26 ASSESSMENT — ENCOUNTER SYMPTOMS
DIARRHEA: 0
WHEEZING: 0
EYE DISCHARGE: 0
EYE PAIN: 0
TROUBLE SWALLOWING: 0
ABDOMINAL PAIN: 0
RHINORRHEA: 1
CHEST TIGHTNESS: 0
VOMITING: 0
COUGH: 1
SHORTNESS OF BREATH: 0
CONSTIPATION: 0
BLOOD IN STOOL: 0

## 2024-02-26 ASSESSMENT — PATIENT HEALTH QUESTIONNAIRE - PHQ9
SUM OF ALL RESPONSES TO PHQ QUESTIONS 1-9: 0
2. FEELING DOWN, DEPRESSED OR HOPELESS: 0
1. LITTLE INTEREST OR PLEASURE IN DOING THINGS: 0
SUM OF ALL RESPONSES TO PHQ QUESTIONS 1-9: 0
SUM OF ALL RESPONSES TO PHQ9 QUESTIONS 1 & 2: 0

## 2024-02-26 NOTE — PROGRESS NOTES
Liz De La Rosa (:  1975) is a 48 y.o. female,Established patient, here for evaluation of the following chief complaint(s):  Cough (Pt. C/o having coughing fit for 1 week, coughing up clear mucous ), Other, and Neck Pain (Pt. C/o having neck and back pain, and wants to discuss changing Gabapentin )         ASSESSMENT/PLAN:  1. Cervical neuropathic pain  -     pregabalin (LYRICA) 25 MG capsule; Take 1 capsule by mouth 2 times daily for 90 days. Max Daily Amount: 50 mg, Disp-180 capsule, R-0Normal  -     acetaminophen-codeine (TYLENOL/CODEINE #3) 300-30 MG per tablet; Take 1 tablet by mouth 2 times daily as needed for Pain for up to 30 days. Intended supply: 3 days. Take lowest dose possible to manage pain Max Daily Amount: 2 tablets, Disp-30 tablet, R-0Normal  One time only script for Tylenol 3#.  Patient is aware that this is controlled.  We do not treat chronic pain and all future scripts will need to come thru pain management.  Sarah refused referral.    Will try Lyrica for her neck and knee pain.  If the neurontin helped her knee pain--this might also help.    Patient instructed that if pain worsens, she may need to consider surgery or change in job.    2. Chronic pain of right knee  -     pregabalin (LYRICA) 25 MG capsule; Take 1 capsule by mouth 2 times daily for 90 days. Max Daily Amount: 50 mg, Disp-180 capsule, R-0Normal  -     acetaminophen-codeine (TYLENOL/CODEINE #3) 300-30 MG per tablet; Take 1 tablet by mouth 2 times daily as needed for Pain for up to 30 days. Intended supply: 3 days. Take lowest dose possible to manage pain Max Daily Amount: 2 tablets, Disp-30 tablet, R-0Normal  Discussed other options for work--good shoes, orthotics.    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        Return in about 2 weeks (around 3/11/2024), or if symptoms worsen or fail to improve.         Subjective

## 2024-07-09 ENCOUNTER — TELEPHONE (OUTPATIENT)
Dept: FAMILY MEDICINE CLINIC | Facility: CLINIC | Age: 49
End: 2024-07-09

## 2024-07-09 NOTE — TELEPHONE ENCOUNTER
Pt left message with [facility] needing an appt    needs med refills     tried to reach pt for an appt   no answer

## 2024-07-09 NOTE — TELEPHONE ENCOUNTER
----- Message from Tamara Hamiltonchristian sent at 7/9/2024  3:09 PM EDT -----  Regarding: [facility] Appointment Request  [facility] Appointment Request    Patient needs appointment for [facility] Appointment Type: Existing Condition Follow Up.  Prescription Refills     Patient Requested Dates(s): Any Day  Patient Requested Time: After 12PM  Provider Name: JoseJoan APRN - CNP    Reason for Appointment Request: New Patient - Available appointments did not meet patient need  --------------------------------------------------------------------------------------------------------------------------    Relationship to Patient: Self     Call Back Information: OK to leave message on voicemail  Preferred Call Back Number: Phone [phone]

## 2024-07-16 ENCOUNTER — TELEPHONE (OUTPATIENT)
Dept: OBGYN CLINIC | Age: 49
End: 2024-07-16

## 2024-07-16 ENCOUNTER — TELEPHONE (OUTPATIENT)
Dept: FAMILY MEDICINE CLINIC | Facility: CLINIC | Age: 49
End: 2024-07-16

## 2024-07-16 DIAGNOSIS — F51.04 CHRONIC INSOMNIA: Primary | ICD-10-CM

## 2024-07-16 DIAGNOSIS — F90.2 ADHD (ATTENTION DEFICIT HYPERACTIVITY DISORDER), COMBINED TYPE: ICD-10-CM

## 2024-07-16 RX ORDER — ZOLPIDEM TARTRATE 5 MG/1
5 TABLET ORAL NIGHTLY PRN
Qty: 30 TABLET | Refills: 0 | Status: SHIPPED | OUTPATIENT
Start: 2024-07-16 | End: 2024-08-15

## 2024-07-16 RX ORDER — DEXTROAMPHETAMINE SACCHARATE, AMPHETAMINE ASPARTATE MONOHYDRATE, DEXTROAMPHETAMINE SULFATE AND AMPHETAMINE SULFATE 1.25; 1.25; 1.25; 1.25 MG/1; MG/1; MG/1; MG/1
5 CAPSULE, EXTENDED RELEASE ORAL DAILY
Qty: 30 CAPSULE | Refills: 0 | Status: SHIPPED | OUTPATIENT
Start: 2024-07-16 | End: 2024-08-15

## 2024-07-16 NOTE — TELEPHONE ENCOUNTER
Pt. Advised of medications sent to pharmacy, advised to keep upcoming appointment for refills. Pt. Agreed. Pt. States she is on hormone therapy and wants to know if this could delay or stop her menstrual. Pt. States she took a pregnancy test and it is negative she just wanted to ask

## 2024-07-16 NOTE — TELEPHONE ENCOUNTER
Patient was scheduled for August to get her med refills by [facility], I was able to get her switched to Monday July 22nd. Patient stated she has been out of the medication for a while now, patient would like to know if she's able to get enough to last her until Mondays appointment. Medications being zolpidem (AMBIEN) 5 MG tablet and amphetamine-dextroamphetamine (ADDERALL XR) 5 MG extended release capsule . Please advice.

## 2024-07-16 NOTE — TELEPHONE ENCOUNTER
A 30 day script was sent to pharmacy.  [facility]. She needs to keep her appointment next week for her two refills.  Joan Garcia

## 2024-07-16 NOTE — TELEPHONE ENCOUNTER
Pt called had missed a period and was concerned. She uses condoms for birth control. She gets testosterone pellets and had recent labs that she states showed a low estrogen level. We do not have those labs to review, but advised that low estrogen can cause irregular periods. She has taken several pregnancy test that have all been negative. Offered her a blood test if she wanted to do that. She will let us know. She will monitor for now and call if problems continue.

## 2024-07-16 NOTE — TELEPHONE ENCOUNTER
It can change her periods--either causing them to be lighter or not at all or even having breath thru bleeding between periods.  Joan Garcia

## 2024-08-15 ENCOUNTER — TELEMEDICINE (OUTPATIENT)
Dept: FAMILY MEDICINE CLINIC | Facility: CLINIC | Age: 49
End: 2024-08-15
Payer: COMMERCIAL

## 2024-08-15 VITALS
HEIGHT: 65 IN | TEMPERATURE: 97.1 F | HEART RATE: 79 BPM | SYSTOLIC BLOOD PRESSURE: 112 MMHG | OXYGEN SATURATION: 99 % | DIASTOLIC BLOOD PRESSURE: 70 MMHG | WEIGHT: 198 LBS | RESPIRATION RATE: 18 BRPM | BODY MASS INDEX: 32.99 KG/M2

## 2024-08-15 DIAGNOSIS — J40 BRONCHITIS: Primary | ICD-10-CM

## 2024-08-15 DIAGNOSIS — F51.04 CHRONIC INSOMNIA: ICD-10-CM

## 2024-08-15 DIAGNOSIS — E66.09 CLASS 1 OBESITY DUE TO EXCESS CALORIES WITH SERIOUS COMORBIDITY AND BODY MASS INDEX (BMI) OF 32.0 TO 32.9 IN ADULT: ICD-10-CM

## 2024-08-15 DIAGNOSIS — F32.0 MAJOR DEPRESSIVE DISORDER, SINGLE EPISODE, MILD (HCC): ICD-10-CM

## 2024-08-15 DIAGNOSIS — F90.2 ADHD (ATTENTION DEFICIT HYPERACTIVITY DISORDER), COMBINED TYPE: ICD-10-CM

## 2024-08-15 DIAGNOSIS — B35.3 TINEA PEDIS OF LEFT FOOT: ICD-10-CM

## 2024-08-15 PROCEDURE — 99214 OFFICE O/P EST MOD 30 MIN: CPT | Performed by: NURSE PRACTITIONER

## 2024-08-15 RX ORDER — AZITHROMYCIN 250 MG/1
TABLET, FILM COATED ORAL
Qty: 6 TABLET | Refills: 0 | Status: SHIPPED | OUTPATIENT
Start: 2024-08-15

## 2024-08-15 RX ORDER — DEXTROAMPHETAMINE SACCHARATE, AMPHETAMINE ASPARTATE MONOHYDRATE, DEXTROAMPHETAMINE SULFATE AND AMPHETAMINE SULFATE 1.25; 1.25; 1.25; 1.25 MG/1; MG/1; MG/1; MG/1
5 CAPSULE, EXTENDED RELEASE ORAL DAILY
Qty: 30 CAPSULE | Refills: 0 | COMMUNITY
Start: 2024-08-15 | End: 2024-08-15

## 2024-08-15 RX ORDER — SPIRONOLACTONE 25 MG/1
25 TABLET ORAL DAILY
COMMUNITY
Start: 2024-07-23

## 2024-08-15 RX ORDER — DEXTROAMPHETAMINE SACCHARATE, AMPHETAMINE ASPARTATE MONOHYDRATE, DEXTROAMPHETAMINE SULFATE AND AMPHETAMINE SULFATE 1.25; 1.25; 1.25; 1.25 MG/1; MG/1; MG/1; MG/1
5 CAPSULE, EXTENDED RELEASE ORAL DAILY
Qty: 30 CAPSULE | Refills: 0 | Status: SHIPPED | OUTPATIENT
Start: 2024-08-15 | End: 2024-09-14

## 2024-08-15 RX ORDER — ZOLPIDEM TARTRATE 5 MG/1
5 TABLET ORAL NIGHTLY PRN
Qty: 30 TABLET | Refills: 0 | Status: SHIPPED | OUTPATIENT
Start: 2024-08-15 | End: 2024-09-14

## 2024-08-15 RX ORDER — BENZONATATE 100 MG/1
100 CAPSULE ORAL 3 TIMES DAILY PRN
Qty: 40 CAPSULE | Refills: 0 | Status: SHIPPED | OUTPATIENT
Start: 2024-08-15 | End: 2024-08-30

## 2024-08-15 RX ORDER — DIETHYLPROPION HYDROCHLORIDE 75 MG/1
TABLET ORAL
COMMUNITY
Start: 2024-06-13

## 2024-08-15 RX ORDER — DEXTROAMPHETAMINE SACCHARATE, AMPHETAMINE ASPARTATE MONOHYDRATE, DEXTROAMPHETAMINE SULFATE AND AMPHETAMINE SULFATE 1.25; 1.25; 1.25; 1.25 MG/1; MG/1; MG/1; MG/1
5 CAPSULE, EXTENDED RELEASE ORAL DAILY
Qty: 30 CAPSULE | Refills: 0 | Status: SHIPPED | OUTPATIENT
Start: 2024-10-14 | End: 2024-11-13

## 2024-08-15 RX ORDER — DIETHYLPROPION HYDROCHLORIDE 75 MG/1
1 TABLET ORAL
Qty: 90 TABLET | Refills: 0 | Status: SHIPPED | OUTPATIENT
Start: 2024-08-15 | End: 2024-11-13

## 2024-08-15 RX ORDER — DEXTROAMPHETAMINE SACCHARATE, AMPHETAMINE ASPARTATE MONOHYDRATE, DEXTROAMPHETAMINE SULFATE AND AMPHETAMINE SULFATE 1.25; 1.25; 1.25; 1.25 MG/1; MG/1; MG/1; MG/1
5 CAPSULE, EXTENDED RELEASE ORAL DAILY
Qty: 30 CAPSULE | Refills: 0 | Status: SHIPPED | OUTPATIENT
Start: 2024-09-14 | End: 2024-10-14

## 2024-08-15 RX ORDER — CLOTRIMAZOLE AND BETAMETHASONE DIPROPIONATE 10; .64 MG/G; MG/G
CREAM TOPICAL
Qty: 45 G | Refills: 2 | Status: SHIPPED | OUTPATIENT
Start: 2024-08-15

## 2024-08-15 SDOH — ECONOMIC STABILITY: INCOME INSECURITY: HOW HARD IS IT FOR YOU TO PAY FOR THE VERY BASICS LIKE FOOD, HOUSING, MEDICAL CARE, AND HEATING?: NOT HARD AT ALL

## 2024-08-15 SDOH — ECONOMIC STABILITY: FOOD INSECURITY: WITHIN THE PAST 12 MONTHS, THE FOOD YOU BOUGHT JUST DIDN'T LAST AND YOU DIDN'T HAVE MONEY TO GET MORE.: NEVER TRUE

## 2024-08-15 SDOH — ECONOMIC STABILITY: FOOD INSECURITY: WITHIN THE PAST 12 MONTHS, YOU WORRIED THAT YOUR FOOD WOULD RUN OUT BEFORE YOU GOT MONEY TO BUY MORE.: NEVER TRUE

## 2024-08-15 ASSESSMENT — PATIENT HEALTH QUESTIONNAIRE - PHQ9
SUM OF ALL RESPONSES TO PHQ QUESTIONS 1-9: 1
SUM OF ALL RESPONSES TO PHQ QUESTIONS 1-9: 1
2. FEELING DOWN, DEPRESSED OR HOPELESS: SEVERAL DAYS
1. LITTLE INTEREST OR PLEASURE IN DOING THINGS: NOT AT ALL
SUM OF ALL RESPONSES TO PHQ QUESTIONS 1-9: 1
SUM OF ALL RESPONSES TO PHQ QUESTIONS 1-9: 1
SUM OF ALL RESPONSES TO PHQ9 QUESTIONS 1 & 2: 1

## 2024-08-15 NOTE — PROGRESS NOTES
Liz De La Rosa (:  1975) is a 48 y.o. female,Established patient, here for evaluation of the following chief complaint(s):  ADHD (Follow up ), Medication Refill, and Cough (\"Wet cough\" for 3 weeks)         Assessment & Plan  Bronchitis   Uncontrolled, continue current medications, lifestyle modifications recommended, and begin antibiotic and cough med.      Orders:    azithromycin (ZITHROMAX) 250 MG tablet; Zpac 250:  take 2 po today then one a day thereafter until gone.    benzonatate (TESSALON) 100 MG capsule; Take 1 capsule by mouth 3 times daily as needed for Cough    Follow up 3-4 days if no better  Monitor temperature.  Increase fluids  OTC Coricidin for symptom control.  To ER with worsening symptoms, high fever, severe neck pain, severe headache, sore throat, difficulty breathing.  Good hand hygiene.     Tinea pedis of left foot   Borderline controlled, changes made today: Lotrisone cream  and lifestyle modifications recommended    Orders:    clotrimazole-betamethasone (LOTRISONE) 1-0.05 % cream; Apply topically 2 times daily.to foot and then moisturize with Cetaphil Cream.    Begin lotrisone.  Consider Nizoral if this does not work.  Chroinc changes to foot--may take a while to resolve.   ADHD (attention deficit hyperactivity disorder), combined type       Orders:    amphetamine-dextroamphetamine (ADDERALL XR) 5 MG extended release capsule; Take 1 capsule by mouth daily for 30 days. Max Daily Amount: 5 mg    amphetamine-dextroamphetamine (ADDERALL XR) 5 MG extended release capsule; Take 1 capsule by mouth daily for 30 days. Max Daily Amount: 5 mg    amphetamine-dextroamphetamine (ADDERALL XR) 5 MG extended release capsule; Take 1 capsule by mouth daily for 30 days. Max Daily Amount: 5 mg    I have reviewed the patient’s controlled substance prescription history, as maintained in the [place] prescription monitoring program, so that the prescription(s) for a  controlled

## 2024-08-15 NOTE — ASSESSMENT & PLAN NOTE
Well-controlled, continue current medications, continue current treatment plan, and mindfulness behaviors to decrease symptoms.     Orders:    amphetamine-dextroamphetamine (ADDERALL XR) 5 MG extended release capsule; Take 1 capsule by mouth daily for 30 days. Max Daily Amount: 5 mg    amphetamine-dextroamphetamine (ADDERALL XR) 5 MG extended release capsule; Take 1 capsule by mouth daily for 30 days. Max Daily Amount: 5 mg    amphetamine-dextroamphetamine (ADDERALL XR) 5 MG extended release capsule; Take 1 capsule by mouth daily for 30 days. Max Daily Amount: 5 mg    I have reviewed the patient’s controlled substance prescription history, as maintained in the [place] prescription monitoring program, so that the prescription(s) for a  controlled substance can be given.  No abnormalities were identified.    Continue Adderall.  Monitor for side effects.

## 2024-08-19 PROBLEM — F33.9 MAJOR DEPRESSIVE DISORDER, RECURRENT, UNSPECIFIED (HCC): Status: RESOLVED | Noted: 2023-09-27 | Resolved: 2024-08-19

## 2024-08-19 PROBLEM — E66.09 CLASS 1 OBESITY DUE TO EXCESS CALORIES WITH SERIOUS COMORBIDITY AND BODY MASS INDEX (BMI) OF 32.0 TO 32.9 IN ADULT: Status: ACTIVE | Noted: 2024-08-19

## 2024-08-19 PROBLEM — O45.90 ANTEPARTUM PLACENTAL ABRUPTION: Status: RESOLVED | Noted: 2020-07-29 | Resolved: 2024-08-19

## 2024-08-19 PROBLEM — F33.0 MAJOR DEPRESSIVE DISORDER, RECURRENT, MILD (HCC): Status: RESOLVED | Noted: 2023-09-27 | Resolved: 2024-08-19

## 2024-08-19 PROBLEM — J34.9 SINUS PROBLEM: Status: RESOLVED | Noted: 2024-01-03 | Resolved: 2024-08-19

## 2024-08-19 PROBLEM — M79.601 RIGHT ARM PAIN: Status: RESOLVED | Noted: 2023-10-06 | Resolved: 2024-08-19

## 2024-08-19 PROBLEM — F33.1 MAJOR DEPRESSIVE DISORDER, RECURRENT, MODERATE (HCC): Status: RESOLVED | Noted: 2023-09-27 | Resolved: 2024-08-19

## 2024-08-19 PROBLEM — E66.811 CLASS 1 OBESITY DUE TO EXCESS CALORIES WITH SERIOUS COMORBIDITY AND BODY MASS INDEX (BMI) OF 32.0 TO 32.9 IN ADULT: Status: ACTIVE | Noted: 2024-08-19

## 2024-08-19 PROBLEM — Z87.59 H/O PRE-ECLAMPSIA: Status: RESOLVED | Noted: 2024-01-03 | Resolved: 2024-08-19

## 2024-08-19 ASSESSMENT — ENCOUNTER SYMPTOMS
RHINORRHEA: 1
SORE THROAT: 1
COUGH: 1

## 2024-08-19 NOTE — ASSESSMENT & PLAN NOTE
Borderline controlled, continue current medications, continue current treatment plan, and lifestyle modifications recommended    Orders:    Diethylpropion HCl CR 75 MG TB24; Take 1 tablet by mouth every morning (before breakfast) for 90 days. Max Daily Amount: 1 tablet    Continue healthy livestyle changes with diet and exercise.

## 2024-08-19 NOTE — ASSESSMENT & PLAN NOTE
At goal, continue current medications, continue current treatment plan, and lifestyle modifications recommended--patient feels her symptoms are better.

## 2024-10-01 ENCOUNTER — TELEPHONE (OUTPATIENT)
Dept: FAMILY MEDICINE CLINIC | Facility: CLINIC | Age: 49
End: 2024-10-01

## 2024-10-01 DIAGNOSIS — F51.04 CHRONIC INSOMNIA: Primary | ICD-10-CM

## 2024-10-01 NOTE — TELEPHONE ENCOUNTER
Pt. Called in stating she did not have any refills on her Ambien requesting for refill be sent to [facility]

## 2024-10-02 RX ORDER — ZOLPIDEM TARTRATE 5 MG/1
5 TABLET ORAL NIGHTLY PRN
Qty: 30 TABLET | Refills: 2 | Status: SHIPPED | OUTPATIENT
Start: 2024-10-02 | End: 2024-11-18 | Stop reason: SDUPTHER

## 2024-11-18 ENCOUNTER — OFFICE VISIT (OUTPATIENT)
Dept: FAMILY MEDICINE CLINIC | Facility: CLINIC | Age: 49
End: 2024-11-18
Payer: COMMERCIAL

## 2024-11-18 VITALS
HEART RATE: 80 BPM | DIASTOLIC BLOOD PRESSURE: 80 MMHG | SYSTOLIC BLOOD PRESSURE: 114 MMHG | OXYGEN SATURATION: 99 % | HEIGHT: 65 IN | WEIGHT: 197 LBS | RESPIRATION RATE: 18 BRPM | TEMPERATURE: 97.3 F | BODY MASS INDEX: 32.82 KG/M2

## 2024-11-18 DIAGNOSIS — L23.5 ALLERGIC CONTACT DERMATITIS DUE TO OTHER CHEMICAL PRODUCTS: ICD-10-CM

## 2024-11-18 DIAGNOSIS — E66.811 CLASS 1 OBESITY DUE TO EXCESS CALORIES WITH SERIOUS COMORBIDITY AND BODY MASS INDEX (BMI) OF 32.0 TO 32.9 IN ADULT: ICD-10-CM

## 2024-11-18 DIAGNOSIS — E66.09 CLASS 1 OBESITY DUE TO EXCESS CALORIES WITH SERIOUS COMORBIDITY AND BODY MASS INDEX (BMI) OF 32.0 TO 32.9 IN ADULT: ICD-10-CM

## 2024-11-18 DIAGNOSIS — F51.04 CHRONIC INSOMNIA: ICD-10-CM

## 2024-11-18 DIAGNOSIS — F90.2 ADHD (ATTENTION DEFICIT HYPERACTIVITY DISORDER), COMBINED TYPE: ICD-10-CM

## 2024-11-18 DIAGNOSIS — B35.3 TINEA PEDIS OF BOTH FEET: Primary | ICD-10-CM

## 2024-11-18 PROCEDURE — 99214 OFFICE O/P EST MOD 30 MIN: CPT | Performed by: NURSE PRACTITIONER

## 2024-11-18 RX ORDER — DEXTROAMPHETAMINE SACCHARATE, AMPHETAMINE ASPARTATE MONOHYDRATE, DEXTROAMPHETAMINE SULFATE AND AMPHETAMINE SULFATE 1.25; 1.25; 1.25; 1.25 MG/1; MG/1; MG/1; MG/1
5 CAPSULE, EXTENDED RELEASE ORAL DAILY
Qty: 30 CAPSULE | Refills: 0 | Status: SHIPPED | OUTPATIENT
Start: 2024-12-18 | End: 2025-01-17

## 2024-11-18 RX ORDER — PREGABALIN 25 MG/1
25 CAPSULE ORAL 2 TIMES DAILY
Qty: 180 CAPSULE | Refills: 0 | Status: CANCELLED | OUTPATIENT
Start: 2024-11-18 | End: 2025-02-16

## 2024-11-18 RX ORDER — DEXTROAMPHETAMINE SACCHARATE, AMPHETAMINE ASPARTATE MONOHYDRATE, DEXTROAMPHETAMINE SULFATE AND AMPHETAMINE SULFATE 1.25; 1.25; 1.25; 1.25 MG/1; MG/1; MG/1; MG/1
5 CAPSULE, EXTENDED RELEASE ORAL EVERY MORNING
COMMUNITY

## 2024-11-18 RX ORDER — ZOLPIDEM TARTRATE 5 MG/1
5 TABLET ORAL NIGHTLY PRN
Qty: 30 TABLET | Refills: 2 | Status: SHIPPED | OUTPATIENT
Start: 2024-11-18 | End: 2025-02-16

## 2024-11-18 RX ORDER — DEXTROAMPHETAMINE SACCHARATE, AMPHETAMINE ASPARTATE MONOHYDRATE, DEXTROAMPHETAMINE SULFATE AND AMPHETAMINE SULFATE 1.25; 1.25; 1.25; 1.25 MG/1; MG/1; MG/1; MG/1
5 CAPSULE, EXTENDED RELEASE ORAL DAILY
Qty: 30 CAPSULE | Refills: 0 | Status: SHIPPED | OUTPATIENT
Start: 2024-11-18 | End: 2024-12-18

## 2024-11-18 RX ORDER — DIETHYLPROPION HYDROCHLORIDE 75 MG/1
1 TABLET, EXTENDED RELEASE ORAL DAILY
Qty: 90 TABLET | Refills: 0 | Status: SHIPPED | OUTPATIENT
Start: 2024-11-18 | End: 2025-02-16

## 2024-11-18 RX ORDER — KETOCONAZOLE 20 MG/G
CREAM TOPICAL
Qty: 60 G | Refills: 1 | Status: SHIPPED | OUTPATIENT
Start: 2024-11-18

## 2024-11-18 RX ORDER — DEXTROAMPHETAMINE SACCHARATE, AMPHETAMINE ASPARTATE MONOHYDRATE, DEXTROAMPHETAMINE SULFATE AND AMPHETAMINE SULFATE 1.25; 1.25; 1.25; 1.25 MG/1; MG/1; MG/1; MG/1
5 CAPSULE, EXTENDED RELEASE ORAL DAILY
Qty: 30 CAPSULE | Refills: 0 | Status: SHIPPED | OUTPATIENT
Start: 2025-01-17 | End: 2025-02-16

## 2024-11-18 RX ORDER — ZOLPIDEM TARTRATE 5 MG/1
5 TABLET ORAL NIGHTLY PRN
Qty: 90 TABLET | Refills: 0 | Status: CANCELLED | OUTPATIENT
Start: 2024-11-18 | End: 2025-02-16

## 2024-11-18 SDOH — ECONOMIC STABILITY: FOOD INSECURITY: WITHIN THE PAST 12 MONTHS, YOU WORRIED THAT YOUR FOOD WOULD RUN OUT BEFORE YOU GOT MONEY TO BUY MORE.: NEVER TRUE

## 2024-11-18 SDOH — ECONOMIC STABILITY: FOOD INSECURITY: WITHIN THE PAST 12 MONTHS, THE FOOD YOU BOUGHT JUST DIDN'T LAST AND YOU DIDN'T HAVE MONEY TO GET MORE.: NEVER TRUE

## 2024-11-18 SDOH — ECONOMIC STABILITY: INCOME INSECURITY: HOW HARD IS IT FOR YOU TO PAY FOR THE VERY BASICS LIKE FOOD, HOUSING, MEDICAL CARE, AND HEATING?: NOT HARD AT ALL

## 2024-11-18 ASSESSMENT — ENCOUNTER SYMPTOMS
RHINORRHEA: 0
BLOOD IN STOOL: 0
DIARRHEA: 0
CHEST TIGHTNESS: 0
COUGH: 0
WHEEZING: 0
ABDOMINAL PAIN: 0
EYE DISCHARGE: 0
EYE PAIN: 0
CONSTIPATION: 0
VOMITING: 0

## 2024-11-18 ASSESSMENT — PATIENT HEALTH QUESTIONNAIRE - PHQ9
SUM OF ALL RESPONSES TO PHQ QUESTIONS 1-9: 0
SUM OF ALL RESPONSES TO PHQ9 QUESTIONS 1 & 2: 0
SUM OF ALL RESPONSES TO PHQ QUESTIONS 1-9: 0
1. LITTLE INTEREST OR PLEASURE IN DOING THINGS: NOT AT ALL
2. FEELING DOWN, DEPRESSED OR HOPELESS: NOT AT ALL
SUM OF ALL RESPONSES TO PHQ QUESTIONS 1-9: 0
SUM OF ALL RESPONSES TO PHQ QUESTIONS 1-9: 0

## 2024-11-18 NOTE — PROGRESS NOTES
Liz De La Rosa (:  1975) is a 49 y.o. female,Established patient, here for evaluation of the following chief complaint(s):  ADHD (3 month follow up ), Insomnia (3 month follow up ), Depression (3 month follow up ), and Medication Refill         Assessment & Plan  Tinea pedis of both feet   Chronic, not at goal (unstable), changes made today: changed cream to Nizoral, medication adherence emphasized, and lifestyle modifications recommended    Orders:    ketoconazole (NIZORAL) 2 % cream; Apply topically nightly to her feet and upper thigh area.    Apply cream to both feet after bath.  Use good moisturizer.  Wear socks over feet at night while in bed.    Allergic contact dermatitis due to other chemical products   Chronic, not at goal (unstable), expect allergy to deodorant.  Recommend spray deodorant.  May use Nizoral on this rash daily also.           ADHD (attention deficit hyperactivity disorder), combined type   Chronic, at goal (stable), continue current treatment plan, medication adherence emphasized, and lifestyle modifications recommended    Orders:    amphetamine-dextroamphetamine (ADDERALL XR) 5 MG extended release capsule; Take 1 capsule by mouth daily for 30 days. Max Daily Amount: 5 mg    amphetamine-dextroamphetamine (ADDERALL XR) 5 MG extended release capsule; Take 1 capsule by mouth daily for 30 days. Max Daily Amount: 5 mg    amphetamine-dextroamphetamine (ADDERALL XR) 5 MG extended release capsule; Take 1 capsule by mouth daily for 30 days. Max Daily Amount: 5 mg    Chronic insomnia   Chronic, at goal (stable), continue current treatment plan, medication adherence emphasized, and lifestyle modifications recommended    Orders:    zolpidem (AMBIEN) 5 MG tablet; Take 1 tablet by mouth nightly as needed for Sleep for up to 90 days. Max Daily Amount: 5 mg    Class 1 obesity due to excess calories with serious comorbidity and body mass index (BMI) of 32.0 to 32.9 in adult

## 2024-11-18 NOTE — ASSESSMENT & PLAN NOTE
Chronic, at goal (stable), continue current treatment plan, medication adherence emphasized, and lifestyle modifications recommended    Orders:    amphetamine-dextroamphetamine (ADDERALL XR) 5 MG extended release capsule; Take 1 capsule by mouth daily for 30 days. Max Daily Amount: 5 mg    amphetamine-dextroamphetamine (ADDERALL XR) 5 MG extended release capsule; Take 1 capsule by mouth daily for 30 days. Max Daily Amount: 5 mg    amphetamine-dextroamphetamine (ADDERALL XR) 5 MG extended release capsule; Take 1 capsule by mouth daily for 30 days. Max Daily Amount: 5 mg

## 2024-11-18 NOTE — PATIENT INSTRUCTIONS

## 2024-11-18 NOTE — ASSESSMENT & PLAN NOTE
Chronic, not at goal (unstable), reviewed lifestyle changes--more exercise, trying not to eat on the go.     Orders:    Diethylpropion HCl CR 75 MG TB24; Take 1 tablet by mouth daily for 90 days. Max Daily Amount: 1 tablet

## 2024-12-23 ENCOUNTER — OFFICE VISIT (OUTPATIENT)
Dept: OBGYN CLINIC | Age: 49
End: 2024-12-23
Payer: COMMERCIAL

## 2024-12-23 VITALS — WEIGHT: 197 LBS | BODY MASS INDEX: 32.78 KG/M2 | SYSTOLIC BLOOD PRESSURE: 128 MMHG | DIASTOLIC BLOOD PRESSURE: 88 MMHG

## 2024-12-23 DIAGNOSIS — Z12.4 ENCOUNTER FOR PAPANICOLAOU SMEAR FOR CERVICAL CANCER SCREENING: ICD-10-CM

## 2024-12-23 DIAGNOSIS — N83.201 BILATERAL OVARIAN CYSTS: ICD-10-CM

## 2024-12-23 DIAGNOSIS — N83.202 BILATERAL OVARIAN CYSTS: ICD-10-CM

## 2024-12-23 DIAGNOSIS — R10.2 PELVIC PAIN: Primary | ICD-10-CM

## 2024-12-23 DIAGNOSIS — Z12.11 ENCOUNTER FOR SCREENING COLONOSCOPY: ICD-10-CM

## 2024-12-23 PROCEDURE — 99459 PELVIC EXAMINATION: CPT | Performed by: OBSTETRICS & GYNECOLOGY

## 2024-12-23 PROCEDURE — 99214 OFFICE O/P EST MOD 30 MIN: CPT | Performed by: OBSTETRICS & GYNECOLOGY

## 2024-12-23 PROCEDURE — 76830 TRANSVAGINAL US NON-OB: CPT | Performed by: OBSTETRICS & GYNECOLOGY

## 2024-12-23 RX ORDER — KETOROLAC TROMETHAMINE 10 MG/1
10 TABLET, FILM COATED ORAL EVERY 6 HOURS PRN
Qty: 20 TABLET | Refills: 0 | Status: SHIPPED | OUTPATIENT
Start: 2024-12-23 | End: 2025-12-23

## 2024-12-23 NOTE — PROGRESS NOTES
Anemia     has never had blood transfusion    Anxiety     Chlamydia     Depression     Genital herpes     no issues since    Hormone replacement therapy     Hypertension in pregnancy, preeclampsia, severe, delivered/postpartum 8/3/2020    Hypothyroidism     states \"borderline\"    LGSIL on Pap smear of cervix 3/19/2020     Past Surgical History:   Procedure Laterality Date     SECTION      2020    COLPOSCOPY      21    WISDOM TOOTH EXTRACTION       Family History   Problem Relation Age of Onset    Diabetes Mother     Hypertension Mother     Cancer Maternal Grandmother         Uterine cancer?    Cancer Paternal Grandmother         Lung Cancer- smoker      Social History     Tobacco Use    Smoking status: Never    Smokeless tobacco: Never   Substance Use Topics    Alcohol use: Yes     Comment: monthly, 2-3 shots once       Social History     Substance and Sexual Activity   Sexual Activity Yes    Partners: Male    Birth control/protection: None     OB History    Para Term  AB Living   1 1 0 0 0 1   SAB IAB Ectopic Molar Multiple Live Births   0 0 0 0 0 0      # Outcome Date GA Lbr Jordon/2nd Weight Sex Type Anes PTL Lv   1 Para               Obstetric Comments   NVD   1  Stillborn           ROS:  Review of Systems  General: Not Present- Fatigue, Insomnia, Hot flashes/Night sweats, Weight gain, Brain fog  Breast: Not Present- Breast Mass, Breast Pain, Breast Swelling, Nipple Discharge, Nipple Pain, Recent Breast Size Changes and Skin Changes.  Gastrointestinal: Not Present- Abdominal Pain,  Bloating, Constipation, Diarrhea, Nausea, Rectal bleeding  Female Genitourinary: Not Present- Dysmenorrhea, Dyspareunia, Decreased libido, Excessive Menstrual Bleeding, Menstrual Irregularities, Positive for Pelvic Pain, Urinary Complaints, Vaginal Discharge, Vaginal itching/burning, Vaginal odor, Vaginal dryness  Psychiatric: Not Present- Anxiety, Depression, Mood changes and Panic

## 2024-12-30 LAB
COLLECTION METHOD: NORMAL
CYTOLOGIST CVX/VAG CYTO: NORMAL
CYTOLOGY CVX/VAG DOC THIN PREP: NORMAL
DATE OF LMP: NORMAL
HPV REFLEX: NORMAL
Lab: NORMAL
PAP SOURCE: NORMAL
PATH REPORT.FINAL DX SPEC: NORMAL
STAT OF ADQ CVX/VAG CYTO-IMP: NORMAL

## 2025-02-12 ENCOUNTER — OFFICE VISIT (OUTPATIENT)
Dept: FAMILY MEDICINE CLINIC | Facility: CLINIC | Age: 50
End: 2025-02-12

## 2025-02-12 VITALS
DIASTOLIC BLOOD PRESSURE: 78 MMHG | HEART RATE: 71 BPM | OXYGEN SATURATION: 99 % | BODY MASS INDEX: 33.32 KG/M2 | TEMPERATURE: 97.5 F | SYSTOLIC BLOOD PRESSURE: 120 MMHG | RESPIRATION RATE: 18 BRPM | WEIGHT: 200 LBS | HEIGHT: 65 IN

## 2025-02-12 DIAGNOSIS — G89.29 CHRONIC PAIN OF RIGHT KNEE: ICD-10-CM

## 2025-02-12 DIAGNOSIS — E66.09 CLASS 1 OBESITY DUE TO EXCESS CALORIES WITH SERIOUS COMORBIDITY AND BODY MASS INDEX (BMI) OF 32.0 TO 32.9 IN ADULT: ICD-10-CM

## 2025-02-12 DIAGNOSIS — Z12.31 ENCOUNTER FOR SCREENING MAMMOGRAM FOR MALIGNANT NEOPLASM OF BREAST: Primary | ICD-10-CM

## 2025-02-12 DIAGNOSIS — M54.12 CERVICAL NEUROPATHIC PAIN: ICD-10-CM

## 2025-02-12 DIAGNOSIS — F90.2 ADHD (ATTENTION DEFICIT HYPERACTIVITY DISORDER), COMBINED TYPE: ICD-10-CM

## 2025-02-12 DIAGNOSIS — J41.1 MUCOPURULENT CHRONIC BRONCHITIS (HCC): ICD-10-CM

## 2025-02-12 DIAGNOSIS — M25.561 CHRONIC PAIN OF RIGHT KNEE: ICD-10-CM

## 2025-02-12 DIAGNOSIS — E66.811 CLASS 1 OBESITY DUE TO EXCESS CALORIES WITH SERIOUS COMORBIDITY AND BODY MASS INDEX (BMI) OF 32.0 TO 32.9 IN ADULT: ICD-10-CM

## 2025-02-12 DIAGNOSIS — R10.2 PELVIC PAIN: ICD-10-CM

## 2025-02-12 DIAGNOSIS — N39.3 STRESS INCONTINENCE OF URINE: ICD-10-CM

## 2025-02-12 DIAGNOSIS — F51.04 CHRONIC INSOMNIA: ICD-10-CM

## 2025-02-12 DIAGNOSIS — B35.3 TINEA PEDIS OF LEFT FOOT: ICD-10-CM

## 2025-02-12 RX ORDER — DIETHYLPROPION HYDROCHLORIDE 75 MG/1
1 TABLET, EXTENDED RELEASE ORAL DAILY
Qty: 90 TABLET | Refills: 0 | Status: SHIPPED | OUTPATIENT
Start: 2025-02-12 | End: 2025-05-13

## 2025-02-12 RX ORDER — KETOROLAC TROMETHAMINE 10 MG/1
10 TABLET, FILM COATED ORAL EVERY 6 HOURS PRN
Qty: 20 TABLET | Refills: 0 | Status: SHIPPED | OUTPATIENT
Start: 2025-02-12 | End: 2026-02-12

## 2025-02-12 RX ORDER — BUDESONIDE AND FORMOTEROL FUMARATE DIHYDRATE 160; 4.5 UG/1; UG/1
2 AEROSOL RESPIRATORY (INHALATION) 2 TIMES DAILY
Qty: 30.6 G | Refills: 1 | Status: SHIPPED | OUTPATIENT
Start: 2025-02-12

## 2025-02-12 RX ORDER — CLOTRIMAZOLE AND BETAMETHASONE DIPROPIONATE 10; .64 MG/G; MG/G
CREAM TOPICAL
Qty: 45 G | Refills: 2 | Status: SHIPPED | OUTPATIENT
Start: 2025-02-12

## 2025-02-12 RX ORDER — PREGABALIN 25 MG/1
25 CAPSULE ORAL 2 TIMES DAILY
Qty: 180 CAPSULE | Refills: 0 | Status: SHIPPED | OUTPATIENT
Start: 2025-02-12 | End: 2025-05-13

## 2025-02-12 RX ORDER — ZOLPIDEM TARTRATE 5 MG/1
5 TABLET ORAL NIGHTLY PRN
Qty: 30 TABLET | Refills: 2 | Status: SHIPPED | OUTPATIENT
Start: 2025-02-12 | End: 2025-05-13

## 2025-02-12 RX ORDER — CETIRIZINE HYDROCHLORIDE 10 MG/1
TABLET, CHEWABLE ORAL
COMMUNITY

## 2025-02-12 RX ORDER — METHOCARBAMOL 500 MG/1
1000 TABLET, FILM COATED ORAL 3 TIMES DAILY PRN
COMMUNITY
Start: 2024-12-10

## 2025-02-12 RX ORDER — DEXTROAMPHETAMINE SACCHARATE, AMPHETAMINE ASPARTATE MONOHYDRATE, DEXTROAMPHETAMINE SULFATE AND AMPHETAMINE SULFATE 1.25; 1.25; 1.25; 1.25 MG/1; MG/1; MG/1; MG/1
5 CAPSULE, EXTENDED RELEASE ORAL DAILY
Qty: 30 CAPSULE | Refills: 0 | Status: SHIPPED | OUTPATIENT
Start: 2025-02-12 | End: 2025-03-14

## 2025-02-12 RX ORDER — DEXTROAMPHETAMINE SACCHARATE, AMPHETAMINE ASPARTATE MONOHYDRATE, DEXTROAMPHETAMINE SULFATE AND AMPHETAMINE SULFATE 1.25; 1.25; 1.25; 1.25 MG/1; MG/1; MG/1; MG/1
5 CAPSULE, EXTENDED RELEASE ORAL DAILY
Qty: 30 CAPSULE | Refills: 0 | Status: SHIPPED | OUTPATIENT
Start: 2025-03-14 | End: 2025-04-13

## 2025-02-12 RX ORDER — DEXTROAMPHETAMINE SACCHARATE, AMPHETAMINE ASPARTATE MONOHYDRATE, DEXTROAMPHETAMINE SULFATE AND AMPHETAMINE SULFATE 1.25; 1.25; 1.25; 1.25 MG/1; MG/1; MG/1; MG/1
5 CAPSULE, EXTENDED RELEASE ORAL DAILY
Qty: 30 CAPSULE | Refills: 0 | Status: SHIPPED | OUTPATIENT
Start: 2025-04-13 | End: 2025-05-13

## 2025-02-12 SDOH — ECONOMIC STABILITY: FOOD INSECURITY: WITHIN THE PAST 12 MONTHS, YOU WORRIED THAT YOUR FOOD WOULD RUN OUT BEFORE YOU GOT MONEY TO BUY MORE.: NEVER TRUE

## 2025-02-12 SDOH — ECONOMIC STABILITY: FOOD INSECURITY: WITHIN THE PAST 12 MONTHS, THE FOOD YOU BOUGHT JUST DIDN'T LAST AND YOU DIDN'T HAVE MONEY TO GET MORE.: NEVER TRUE

## 2025-02-12 ASSESSMENT — ENCOUNTER SYMPTOMS
RHINORRHEA: 1
ABDOMINAL PAIN: 0
EYE DISCHARGE: 0
DIARRHEA: 0
CHEST TIGHTNESS: 0
CONSTIPATION: 0
SHORTNESS OF BREATH: 0
BACK PAIN: 1
VOMITING: 0
WHEEZING: 1
COUGH: 1
BLOOD IN STOOL: 0
EYE PAIN: 0

## 2025-02-12 ASSESSMENT — PATIENT HEALTH QUESTIONNAIRE - PHQ9
SUM OF ALL RESPONSES TO PHQ QUESTIONS 1-9: 0
SUM OF ALL RESPONSES TO PHQ QUESTIONS 1-9: 0
SUM OF ALL RESPONSES TO PHQ9 QUESTIONS 1 & 2: 0
SUM OF ALL RESPONSES TO PHQ QUESTIONS 1-9: 0
1. LITTLE INTEREST OR PLEASURE IN DOING THINGS: NOT AT ALL
2. FEELING DOWN, DEPRESSED OR HOPELESS: NOT AT ALL
SUM OF ALL RESPONSES TO PHQ QUESTIONS 1-9: 0

## 2025-02-12 NOTE — ASSESSMENT & PLAN NOTE
Chronic, not at goal (unstable), changes made today: refilled diethylpropion and lifestyle modifications recommended    Orders:    Diethylpropion HCl CR 75 MG TB24; Take 1 tablet by mouth daily for 90 days. Max Daily Amount: 1 tablet    Increase exercise to 30-60 minutes daily  Avoid sweet drinks and sugars  High protein, high vegetable diet.  Less processed foods.

## 2025-02-12 NOTE — PATIENT INSTRUCTIONS

## 2025-02-12 NOTE — ASSESSMENT & PLAN NOTE
Chronic, at goal (stable), continue current treatment plan, medication adherence emphasized, and lifestyle modifications recommended    Orders:    amphetamine-dextroamphetamine (ADDERALL XR) 5 MG extended release capsule; Take 1 capsule by mouth daily for 30 days. Max Daily Amount: 5 mg    amphetamine-dextroamphetamine (ADDERALL XR) 5 MG extended release capsule; Take 1 capsule by mouth daily for 30 days. Max Daily Amount: 5 mg    amphetamine-dextroamphetamine (ADDERALL XR) 5 MG extended release capsule; Take 1 capsule by mouth daily for 30 days. Max Daily Amount: 5 mg    Good sleep hygiene.    Monitor for side effects.  I have reviewed the patient’s controlled substance prescription history, as maintained in the [place] prescription monitoring program, so that the prescription(s) for a  controlled substance can be given.  No abnormalities were identified.

## 2025-02-12 NOTE — PROGRESS NOTES
Liz De La Rosa (:  1975) is a 49 y.o. female,Established patient, here for evaluation of the following chief complaint(s):  ADHD (3 month follow up ), Insomnia (3 month following up ), and Medication Refill         Assessment & Plan  Stress incontinence of urine   Chronic, worsening (exacerbation), changes made today: begin physical therapy.  May need referral to Incontinence cEnter if this does not help.     Orders:    [facility] - Physical Therapy, [facility]    Mucopurulent chronic bronchitis (HCC)   Chronic, not at goal (unstable), changes made today: referral to allergist, will begin Symbicort inhaler.      Orders:    budesonide-formoterol (SYMBICORT) 160-4.5 MCG/ACT AERO; Inhale 2 puffs into the lungs 2 times daily    [facility] - Nabil Victor MD, Allergy & Immunology, [place]    Chronic cough with her ongoing bronchitis--asthma?   Class 1 obesity due to excess calories with serious comorbidity and body mass index (BMI) of 32.0 to 32.9 in adult   Chronic, not at goal (unstable), changes made today: refilled diethylpropion and lifestyle modifications recommended    Orders:    Diethylpropion HCl CR 75 MG TB24; Take 1 tablet by mouth daily for 90 days. Max Daily Amount: 1 tablet    Increase exercise to 30-60 minutes daily  Avoid sweet drinks and sugars  High protein, high vegetable diet.  Less processed foods.   ADHD (attention deficit hyperactivity disorder), combined type   Chronic, at goal (stable), continue current treatment plan, medication adherence emphasized, and lifestyle modifications recommended    Orders:    amphetamine-dextroamphetamine (ADDERALL XR) 5 MG extended release capsule; Take 1 capsule by mouth daily for 30 days. Max Daily Amount: 5 mg    amphetamine-dextroamphetamine (ADDERALL XR) 5 MG extended release capsule; Take 1 capsule by mouth daily for 30 days. Max Daily Amount: 5 mg    amphetamine-dextroamphetamine (ADDERALL XR) 5 MG extended release capsule; Take

## 2025-03-12 ENCOUNTER — OFFICE VISIT (OUTPATIENT)
Dept: FAMILY MEDICINE CLINIC | Facility: CLINIC | Age: 50
End: 2025-03-12

## 2025-03-12 VITALS
TEMPERATURE: 97.9 F | HEIGHT: 65 IN | SYSTOLIC BLOOD PRESSURE: 126 MMHG | DIASTOLIC BLOOD PRESSURE: 90 MMHG | BODY MASS INDEX: 31.65 KG/M2 | WEIGHT: 190 LBS | HEART RATE: 112 BPM | OXYGEN SATURATION: 99 % | RESPIRATION RATE: 19 BRPM

## 2025-03-12 DIAGNOSIS — J20.9 ACUTE BRONCHITIS, UNSPECIFIED ORGANISM: Primary | ICD-10-CM

## 2025-03-12 RX ORDER — FLUTICASONE PROPIONATE 50 MCG
SPRAY, SUSPENSION (ML) NASAL
COMMUNITY
Start: 2025-02-26

## 2025-03-12 RX ORDER — IBUPROFEN 800 MG/1
TABLET, FILM COATED ORAL
COMMUNITY
Start: 2025-02-27

## 2025-03-12 RX ORDER — PREDNISONE 20 MG/1
40 TABLET ORAL 2 TIMES DAILY
Qty: 20 TABLET | Refills: 0 | Status: SHIPPED | OUTPATIENT
Start: 2025-03-12 | End: 2025-03-17

## 2025-03-12 RX ORDER — HYDROCODONE BITARTRATE AND ACETAMINOPHEN 7.5; 325 MG/15ML; MG/15ML
10 SOLUTION ORAL EVERY 6 HOURS PRN
Qty: 150 ML | Refills: 0 | Status: SHIPPED | OUTPATIENT
Start: 2025-03-12 | End: 2025-03-17

## 2025-03-12 RX ORDER — DOXYCYCLINE 100 MG/1
100 CAPSULE ORAL 2 TIMES DAILY
COMMUNITY
Start: 2025-03-10 | End: 2025-03-17

## 2025-03-12 RX ORDER — KETOROLAC TROMETHAMINE 10 MG/1
10 TABLET, FILM COATED ORAL EVERY 6 HOURS PRN
Qty: 20 TABLET | Refills: 2 | Status: SHIPPED | OUTPATIENT
Start: 2025-03-12 | End: 2026-03-12

## 2025-03-12 ASSESSMENT — ENCOUNTER SYMPTOMS
SHORTNESS OF BREATH: 1
COUGH: 1

## 2025-03-12 NOTE — ASSESSMENT & PLAN NOTE
Clinically the patient has bronchitis and when seen in the ED the other day and x-rays were performed she had evidence of acute sinusitis as well.  They have given her Augmentin and doxycycline which is robust antibiotic coverage.  She is mostly bothered by a vexing cough particular when she tries to lay down to rest.  Will give her some Hycet for that.  A 5-day burst of some prednisone would likely help as well.  She is also run out of her ketorolac so I gave her a refill of that.    Orders:    HYDROcodone-acetaminophen 2.5-108 mg/5 mL solution; Take 10 mLs by mouth every 6 hours as needed for Pain (for pain and coughing) for up to 5 days. Max Daily Amount: 40 mLs

## 2025-03-12 NOTE — ASSESSMENT & PLAN NOTE
Stable.  126/90.  Will need to continue monitoring this, especially when she is not acutely ill.

## 2025-03-12 NOTE — PATIENT INSTRUCTIONS
Please use your albuterol inhaler 2 puffs 4 times a day for 1 week.  After that you may use it every 4 hours as needed for wheezing or shortness of breath.  Best wishes, Dr. Leiva

## 2025-03-12 NOTE — PROGRESS NOTES
Liz De La Rosa (:  1975) is a 49 y.o. female,Established patient, here for evaluation of the following chief complaint(s):  Follow-up (ER follow up// sinusitis and pneumonia ), Cough, and Shortness of Breath         Assessment & Plan  Acute bronchitis, unspecified organism  Clinically the patient has bronchitis and when seen in the ED the other day and x-rays were performed she had evidence of acute sinusitis as well.  They have given her Augmentin and doxycycline which is robust antibiotic coverage.  She is mostly bothered by a vexing cough particular when she tries to lay down to rest.  Will give her some Hycet for that.  A 5-day burst of some prednisone would likely help as well.  She is also run out of her ketorolac so I gave her a refill of that.    Orders:    HYDROcodone-acetaminophen 2.5-108 mg/5 mL solution; Take 10 mLs by mouth every 6 hours as needed for Pain (for pain and coughing) for up to 5 days. Max Daily Amount: 40 mLs    Postpartum hypertension  Stable.  126/90.  Will need to continue monitoring this, especially when she is not acutely ill.           Follow-up in about 2 months with Keiko or sooner as needed.       Subjective   Has had about a 2-month course of coughing and congestion and some postnasal drainage.  This led her to the emergency department 2 days ago and she was diagnosed with possible pneumonia on CT chest and was started on Augmentin and doxycycline.  They also noticed acute sinusitis on his CT of the soft tissue neck.  No PE was noted.  Patient's relieve asked by coughing that keeps her from resting at night.  She tells me that she was given a course of prednisone back in December for what sounds like allergies but this is the first course of antibiotic she is taking for this continue of illness.    Cough  Associated symptoms include shortness of breath.   Shortness of Breath        Review of Systems   Respiratory:  Positive for cough and shortness of

## 2025-03-20 ENCOUNTER — TELEPHONE (OUTPATIENT)
Dept: FAMILY MEDICINE CLINIC | Facility: CLINIC | Age: 50
End: 2025-03-20

## 2025-03-20 NOTE — TELEPHONE ENCOUNTER
Ms. De La Rosa called in that she was diagnosed with Bronchitis/Pneumonia.     She said she is having urgent issues with her LA.    She stated right now she is still unwell. She does not have the [facility] paperwork to give to us yet and has to call her employer to fax over to us.    I informed her Ms Aden would need to have that paperwork in order to send it in.     She said her LA is set to end on 03/25/25.     We are awaiting for her fax to come through.

## 2025-03-27 ENCOUNTER — TELEPHONE (OUTPATIENT)
Dept: FAMILY MEDICINE CLINIC | Facility: CLINIC | Age: 50
End: 2025-03-27

## 2025-03-27 NOTE — TELEPHONE ENCOUNTER
Tomer Leiva,    Ms. De La Rosa has an appointment scheduled with you tomorrow at 11 am.     She is having serious issues with her FMLA, her deadline is 04/02/2025.     She said she will be fired from work if her documents are not completed by you. She thought she could see Ms. Aden, but because she saw you in regards to her sickness, you would be the provider that would have to fill out her paperwork.     She said her FMLA started from March 11th 2025 to the day of release (which would be determined by you). She really needs to get back to work.    She said because of how her visit with you was written as her having \"bronchitis\" and not the serious condition she has, her claim will be denied and she is unable to make up for the time lost being ill.     She said there was some initial paperwork filled out during her first visit with you, I was not sure what that was?     There are two documents her HR department sent over that I will place on your desk:    -Certification of Health Care Provider: Employee's Serious Health Condition    -Medical Information Request and Provider Statement.

## 2025-03-27 NOTE — TELEPHONE ENCOUNTER
Ms. De La Rosa came by the office and is in urgent need to have her FMLA paperwork filled out. Her deadline is 04/02/2025.     The HR department of her employer sent her documents that is needed for the provider fill out.     Clinical stated because the patient saw Dr viera, he would need to fill it out.     We suggested she send the documents through [place] because fax will take too long.    -Provider Statement  -Other Supportive Documentation

## 2025-03-28 ENCOUNTER — TELEPHONE (OUTPATIENT)
Dept: FAMILY MEDICINE CLINIC | Facility: CLINIC | Age: 50
End: 2025-03-28

## 2025-03-28 ENCOUNTER — OFFICE VISIT (OUTPATIENT)
Dept: FAMILY MEDICINE CLINIC | Facility: CLINIC | Age: 50
End: 2025-03-28
Payer: COMMERCIAL

## 2025-03-28 VITALS
SYSTOLIC BLOOD PRESSURE: 132 MMHG | HEART RATE: 88 BPM | BODY MASS INDEX: 32.65 KG/M2 | DIASTOLIC BLOOD PRESSURE: 88 MMHG | HEIGHT: 65 IN | RESPIRATION RATE: 18 BRPM | OXYGEN SATURATION: 99 % | TEMPERATURE: 97.6 F | WEIGHT: 196 LBS

## 2025-03-28 DIAGNOSIS — J98.4 ACUTE PNEUMONITIS: Primary | ICD-10-CM

## 2025-03-28 PROCEDURE — 99213 OFFICE O/P EST LOW 20 MIN: CPT | Performed by: FAMILY MEDICINE

## 2025-03-28 RX ORDER — FLUCONAZOLE 150 MG/1
150 TABLET ORAL ONCE
Qty: 1 TABLET | Refills: 0 | Status: SHIPPED | OUTPATIENT
Start: 2025-03-28 | End: 2025-03-28

## 2025-03-28 NOTE — PROGRESS NOTES
Liz De La Rosa (:  1975) is a 49 y.o. female,Established patient, here for evaluation of the following chief complaint(s):  Follow-up ([facility] paperwork )    Patient was seen     Assessment & Plan  Acute pneumonitis   Her symptoms have completely resolved with treatments and she presents today to have me fill out [facility] paperwork which was quite extensive.  Saw her initially I diagnosed her with bronchitis since I could not hear any consolidation on exam and her chest x-ray was clear.  However it is true that she did have inflammatory pneumonitis on CT of the chest 2 days earlier in the emergency department.  The antibiotics certainly should have been effective for that and they seem to have been clinically.  Her sinuses are still troubling her but this is largely chronic and allergy related.  She says that ENT consultation has not been helpful in the past for these allergy symptoms but given the trouble that she has had recently I did recommend that she see them again and she says she will call for an appointment.  She has never had any work restrictions made formally that I can tell either at the emergency department or here by us in the office but I certainly agree with her return to regular work.           Return if symptoms worsen or fail to improve.       Subjective   The patient was seen in the emergency department on 10 March of this year and then follow-up on the  by myself in the office.  She was diagnosed with acute sinusitis and also had a finding on CT scan only of inflammatory pneumonitis of indeterminant cause.  She was given Augmentin and doxycycline from the ED. they also recommended consideration of  pulmonary and ENT referrals.  Since taking the antibiotics patient has developed some vaginal irritation consistent with Candida like she has had in the past and she would like to have Diflucan prescribed.  That certainly is reasonable.  Still has allergy symptoms.  She

## 2025-03-28 NOTE — TELEPHONE ENCOUNTER
Tomer Baptiste    Ms. De La Rosa had an appointment today with Dr Leiva and he filled out her FMLA.    But she needs for our office to speak to and send over a letter to her Benefits HR Team that she is released from her leave.     She gave me the associate's phone number and email address. They need to talk to us ASAP.     I informed her I am unable to send that team her information via email, so  I am unsure how we can get that over. Fax is the only option I can think of.    They need a call from clinical as soon as possible for her release.     Contact:    Adrianna David    Phone: [phone]    Email: [email or web address]

## 2025-04-02 ENCOUNTER — HOSPITAL ENCOUNTER (OUTPATIENT)
Dept: MAMMOGRAPHY | Age: 50
Discharge: HOME | End: 2025-04-05
Payer: COMMERCIAL

## 2025-04-02 DIAGNOSIS — Z12.31 ENCOUNTER FOR SCREENING MAMMOGRAM FOR MALIGNANT NEOPLASM OF BREAST: ICD-10-CM

## 2025-04-02 PROCEDURE — 77063 BREAST TOMOSYNTHESIS BI: CPT

## 2025-04-02 PROCEDURE — 77067 SCR MAMMO BI INCL CAD: CPT

## 2025-04-07 ENCOUNTER — RESULTS FOLLOW-UP (OUTPATIENT)
Dept: FAMILY MEDICINE CLINIC | Facility: CLINIC | Age: 50
End: 2025-04-07

## 2025-04-07 NOTE — PROGRESS NOTES
MERVIN  Liz De La Rosa is a 49 y.o. female seen for follow up pelvic pain and ovarian cysts.  She is feeling better.      Past Medical History, Past Surgical History, Family history, Social History, and Medications were all reviewed with the patient today and updated as necessary.     Current Outpatient Medications   Medication Sig    fluticasone (FLONASE) 50 MCG/ACT nasal spray 1 SPRAY INTO INTO EACH NOSTRIL TWICE A DAY    ibuprofen (ADVIL;MOTRIN) 800 MG tablet PLEASE SEE ATTACHED FOR DETAILED DIRECTIONS    ketorolac (TORADOL) 10 MG tablet Take 1 tablet by mouth every 6 hours as needed for Pain    clotrimazole-betamethasone (LOTRISONE) 1-0.05 % cream Apply topically 2 times daily.to foot and then moisturize with Cetaphil Cream.    zolpidem (AMBIEN) 5 MG tablet Take 1 tablet by mouth nightly as needed for Sleep for up to 90 days. Max Daily Amount: 5 mg    cetirizine (ZYRTEC) 10 MG chewable tablet Zyrtec    Diethylpropion HCl CR 75 MG TB24 Take 1 tablet by mouth daily for 90 days. Max Daily Amount: 1 tablet    budesonide-formoterol (SYMBICORT) 160-4.5 MCG/ACT AERO Inhale 2 puffs into the lungs 2 times daily    amphetamine-dextroamphetamine (ADDERALL XR) 5 MG extended release capsule Take 1 capsule by mouth every morning.    pregabalin (LYRICA) 25 MG capsule Take 1 capsule by mouth 2 times daily for 90 days. Max Daily Amount: 50 mg (Patient not taking: Reported on 4/8/2025)    methocarbamol (ROBAXIN) 500 MG tablet Take 2 tablets by mouth 3 times daily as needed (Patient not taking: Reported on 4/8/2025)    amphetamine-dextroamphetamine (ADDERALL XR) 5 MG extended release capsule Take 1 capsule by mouth daily for 30 days. Max Daily Amount: 5 mg    amphetamine-dextroamphetamine (ADDERALL XR) 5 MG extended release capsule Take 1 capsule by mouth daily for 30 days. Max Daily Amount: 5 mg (Patient not taking: Reported on 4/8/2025)    [START ON 4/13/2025] amphetamine-dextroamphetamine (ADDERALL XR) 5 MG extended

## 2025-04-08 ENCOUNTER — PROCEDURE VISIT (OUTPATIENT)
Dept: OBGYN CLINIC | Age: 50
End: 2025-04-08
Payer: COMMERCIAL

## 2025-04-08 VITALS
BODY MASS INDEX: 33.15 KG/M2 | HEIGHT: 65 IN | WEIGHT: 199 LBS | SYSTOLIC BLOOD PRESSURE: 122 MMHG | DIASTOLIC BLOOD PRESSURE: 84 MMHG

## 2025-04-08 DIAGNOSIS — N83.201 BILATERAL OVARIAN CYSTS: Primary | ICD-10-CM

## 2025-04-08 DIAGNOSIS — N83.202 BILATERAL OVARIAN CYSTS: Primary | ICD-10-CM

## 2025-04-08 PROCEDURE — 99214 OFFICE O/P EST MOD 30 MIN: CPT | Performed by: OBSTETRICS & GYNECOLOGY

## 2025-04-08 PROCEDURE — 76830 TRANSVAGINAL US NON-OB: CPT | Performed by: OBSTETRICS & GYNECOLOGY

## 2025-05-12 ENCOUNTER — OFFICE VISIT (OUTPATIENT)
Dept: FAMILY MEDICINE CLINIC | Facility: CLINIC | Age: 50
End: 2025-05-12
Payer: COMMERCIAL

## 2025-05-12 VITALS
TEMPERATURE: 98.3 F | HEART RATE: 86 BPM | HEIGHT: 65 IN | BODY MASS INDEX: 32.65 KG/M2 | DIASTOLIC BLOOD PRESSURE: 86 MMHG | OXYGEN SATURATION: 96 % | WEIGHT: 196 LBS | RESPIRATION RATE: 17 BRPM | SYSTOLIC BLOOD PRESSURE: 124 MMHG

## 2025-05-12 DIAGNOSIS — Z00.00 PHYSICAL EXAM, ANNUAL: Primary | ICD-10-CM

## 2025-05-12 DIAGNOSIS — Z12.11 SCREENING FOR COLON CANCER: ICD-10-CM

## 2025-05-12 DIAGNOSIS — F90.2 ATTENTION DEFICIT HYPERACTIVITY DISORDER (ADHD), COMBINED TYPE: ICD-10-CM

## 2025-05-12 DIAGNOSIS — J41.1 MUCOPURULENT CHRONIC BRONCHITIS (HCC): ICD-10-CM

## 2025-05-12 DIAGNOSIS — M25.472 LEFT ANKLE SWELLING: ICD-10-CM

## 2025-05-12 DIAGNOSIS — E66.811 CLASS 1 OBESITY DUE TO EXCESS CALORIES WITH SERIOUS COMORBIDITY AND BODY MASS INDEX (BMI) OF 32.0 TO 32.9 IN ADULT: ICD-10-CM

## 2025-05-12 DIAGNOSIS — J30.1 NON-SEASONAL ALLERGIC RHINITIS DUE TO POLLEN: ICD-10-CM

## 2025-05-12 DIAGNOSIS — E66.09 CLASS 1 OBESITY DUE TO EXCESS CALORIES WITH SERIOUS COMORBIDITY AND BODY MASS INDEX (BMI) OF 32.0 TO 32.9 IN ADULT: ICD-10-CM

## 2025-05-12 DIAGNOSIS — F51.04 CHRONIC INSOMNIA: ICD-10-CM

## 2025-05-12 LAB
ALBUMIN SERPL-MCNC: 3.9 G/DL (ref 3.5–5)
ALBUMIN/GLOB SERPL: 1.1 (ref 1–1.9)
ALP SERPL-CCNC: 63 U/L (ref 35–104)
ALT SERPL-CCNC: 25 U/L (ref 8–45)
ANION GAP SERPL CALC-SCNC: 11 MMOL/L (ref 7–16)
AST SERPL-CCNC: 31 U/L (ref 15–37)
BASOPHILS # BLD: 0.05 K/UL (ref 0–0.2)
BASOPHILS NFR BLD: 0.8 % (ref 0–2)
BILIRUB SERPL-MCNC: 0.6 MG/DL (ref 0–1.2)
BUN SERPL-MCNC: 10 MG/DL (ref 6–23)
CALCIUM SERPL-MCNC: 9.6 MG/DL (ref 8.8–10.2)
CHLORIDE SERPL-SCNC: 104 MMOL/L (ref 98–107)
CHOLEST SERPL-MCNC: 163 MG/DL (ref 0–200)
CO2 SERPL-SCNC: 24 MMOL/L (ref 20–29)
CREAT SERPL-MCNC: 1.02 MG/DL (ref 0.6–1.1)
DIFFERENTIAL METHOD BLD: ABNORMAL
EOSINOPHIL # BLD: 0.65 K/UL (ref 0–0.8)
EOSINOPHIL NFR BLD: 9.8 % (ref 0.5–7.8)
ERYTHROCYTE [DISTWIDTH] IN BLOOD BY AUTOMATED COUNT: 14.4 % (ref 11.9–14.6)
GLOBULIN SER CALC-MCNC: 3.5 G/DL (ref 2.3–3.5)
GLUCOSE SERPL-MCNC: 99 MG/DL (ref 70–99)
HCT VFR BLD AUTO: 40.3 % (ref 35.8–46.3)
HDLC SERPL-MCNC: 69 MG/DL (ref 40–60)
HDLC SERPL: 2.4 (ref 0–5)
HGB BLD-MCNC: 12.5 G/DL (ref 11.7–15.4)
IMM GRANULOCYTES # BLD AUTO: 0.01 K/UL (ref 0–0.5)
IMM GRANULOCYTES NFR BLD AUTO: 0.2 % (ref 0–5)
LDLC SERPL CALC-MCNC: 85 MG/DL (ref 0–100)
LYMPHOCYTES # BLD: 2.55 K/UL (ref 0.5–4.6)
LYMPHOCYTES NFR BLD: 38.3 % (ref 13–44)
MCH RBC QN AUTO: 27.5 PG (ref 26.1–32.9)
MCHC RBC AUTO-ENTMCNC: 31 G/DL (ref 31.4–35)
MCV RBC AUTO: 88.6 FL (ref 82–102)
MONOCYTES # BLD: 0.5 K/UL (ref 0.1–1.3)
MONOCYTES NFR BLD: 7.5 % (ref 4–12)
NEUTS SEG # BLD: 2.9 K/UL (ref 1.7–8.2)
NEUTS SEG NFR BLD: 43.4 % (ref 43–78)
NRBC # BLD: 0 K/UL (ref 0–0.2)
PLATELET # BLD AUTO: 393 K/UL (ref 150–450)
PMV BLD AUTO: 9.4 FL (ref 9.4–12.3)
POTASSIUM SERPL-SCNC: 4.2 MMOL/L (ref 3.5–5.1)
PROT SERPL-MCNC: 7.3 G/DL (ref 6.3–8.2)
RBC # BLD AUTO: 4.55 M/UL (ref 4.05–5.2)
SODIUM SERPL-SCNC: 139 MMOL/L (ref 136–145)
TRIGL SERPL-MCNC: 46 MG/DL (ref 0–150)
TSH, 3RD GENERATION: 0.19 UIU/ML (ref 0.27–4.2)
VLDLC SERPL CALC-MCNC: 9 MG/DL (ref 6–23)
WBC # BLD AUTO: 6.7 K/UL (ref 4.3–11.1)

## 2025-05-12 PROCEDURE — 99396 PREV VISIT EST AGE 40-64: CPT | Performed by: NURSE PRACTITIONER

## 2025-05-12 RX ORDER — DEXTROAMPHETAMINE SACCHARATE, AMPHETAMINE ASPARTATE MONOHYDRATE, DEXTROAMPHETAMINE SULFATE AND AMPHETAMINE SULFATE 1.25; 1.25; 1.25; 1.25 MG/1; MG/1; MG/1; MG/1
5 CAPSULE, EXTENDED RELEASE ORAL 2 TIMES DAILY
Qty: 60 CAPSULE | Refills: 0 | Status: SHIPPED | OUTPATIENT
Start: 2025-07-11 | End: 2025-08-10

## 2025-05-12 RX ORDER — DEXTROAMPHETAMINE SACCHARATE, AMPHETAMINE ASPARTATE MONOHYDRATE, DEXTROAMPHETAMINE SULFATE AND AMPHETAMINE SULFATE 1.25; 1.25; 1.25; 1.25 MG/1; MG/1; MG/1; MG/1
5 CAPSULE, EXTENDED RELEASE ORAL 2 TIMES DAILY
Qty: 60 CAPSULE | Refills: 0 | Status: SHIPPED | OUTPATIENT
Start: 2025-05-12 | End: 2025-06-11

## 2025-05-12 RX ORDER — DEXTROAMPHETAMINE SACCHARATE, AMPHETAMINE ASPARTATE MONOHYDRATE, DEXTROAMPHETAMINE SULFATE AND AMPHETAMINE SULFATE 1.25; 1.25; 1.25; 1.25 MG/1; MG/1; MG/1; MG/1
5 CAPSULE, EXTENDED RELEASE ORAL 2 TIMES DAILY
Qty: 60 CAPSULE | Refills: 0 | Status: SHIPPED | OUTPATIENT
Start: 2025-06-11 | End: 2025-07-11

## 2025-05-12 RX ORDER — DIETHYLPROPION HYDROCHLORIDE 75 MG/1
1 TABLET, EXTENDED RELEASE ORAL DAILY
Qty: 90 TABLET | Refills: 0 | Status: SHIPPED | OUTPATIENT
Start: 2025-05-12 | End: 2025-08-10

## 2025-05-12 RX ORDER — BUDESONIDE AND FORMOTEROL FUMARATE DIHYDRATE 160; 4.5 UG/1; UG/1
2 AEROSOL RESPIRATORY (INHALATION) 2 TIMES DAILY
Qty: 30.6 G | Refills: 1 | Status: SHIPPED | OUTPATIENT
Start: 2025-05-12

## 2025-05-12 RX ORDER — ZOLPIDEM TARTRATE 5 MG/1
5 TABLET ORAL NIGHTLY PRN
Qty: 30 TABLET | Refills: 2 | Status: SHIPPED | OUTPATIENT
Start: 2025-05-12 | End: 2025-08-10

## 2025-05-12 SDOH — ECONOMIC STABILITY: FOOD INSECURITY: WITHIN THE PAST 12 MONTHS, THE FOOD YOU BOUGHT JUST DIDN'T LAST AND YOU DIDN'T HAVE MONEY TO GET MORE.: NEVER TRUE

## 2025-05-12 SDOH — ECONOMIC STABILITY: FOOD INSECURITY: WITHIN THE PAST 12 MONTHS, YOU WORRIED THAT YOUR FOOD WOULD RUN OUT BEFORE YOU GOT MONEY TO BUY MORE.: NEVER TRUE

## 2025-05-12 ASSESSMENT — PATIENT HEALTH QUESTIONNAIRE - PHQ9
6. FEELING BAD ABOUT YOURSELF - OR THAT YOU ARE A FAILURE OR HAVE LET YOURSELF OR YOUR FAMILY DOWN: NOT AT ALL
SUM OF ALL RESPONSES TO PHQ QUESTIONS 1-9: 0
10. IF YOU CHECKED OFF ANY PROBLEMS, HOW DIFFICULT HAVE THESE PROBLEMS MADE IT FOR YOU TO DO YOUR WORK, TAKE CARE OF THINGS AT HOME, OR GET ALONG WITH OTHER PEOPLE: NOT DIFFICULT AT ALL
1. LITTLE INTEREST OR PLEASURE IN DOING THINGS: NOT AT ALL
8. MOVING OR SPEAKING SO SLOWLY THAT OTHER PEOPLE COULD HAVE NOTICED. OR THE OPPOSITE, BEING SO FIGETY OR RESTLESS THAT YOU HAVE BEEN MOVING AROUND A LOT MORE THAN USUAL: NOT AT ALL
SUM OF ALL RESPONSES TO PHQ QUESTIONS 1-9: 0
4. FEELING TIRED OR HAVING LITTLE ENERGY: NOT AT ALL
SUM OF ALL RESPONSES TO PHQ QUESTIONS 1-9: 0
2. FEELING DOWN, DEPRESSED OR HOPELESS: NOT AT ALL
5. POOR APPETITE OR OVEREATING: NOT AT ALL
7. TROUBLE CONCENTRATING ON THINGS, SUCH AS READING THE NEWSPAPER OR WATCHING TELEVISION: NOT AT ALL
9. THOUGHTS THAT YOU WOULD BE BETTER OFF DEAD, OR OF HURTING YOURSELF: NOT AT ALL
3. TROUBLE FALLING OR STAYING ASLEEP: NOT AT ALL
SUM OF ALL RESPONSES TO PHQ QUESTIONS 1-9: 0

## 2025-05-12 ASSESSMENT — ENCOUNTER SYMPTOMS
WHEEZING: 0
CHEST TIGHTNESS: 0
CONSTIPATION: 0
COUGH: 0
EYE DISCHARGE: 0
SHORTNESS OF BREATH: 0
RHINORRHEA: 0
DIARRHEA: 0
BLOOD IN STOOL: 0
EYE PAIN: 0
VOMITING: 0
ABDOMINAL PAIN: 0

## 2025-05-12 NOTE — ASSESSMENT & PLAN NOTE
Chronic, not at goal (unstable), continue current treatment plan, medication adherence emphasized, and lifestyle modifications recommended    Orders:    Diethylpropion HCl CR 75 MG TB24; Take 1 tablet by mouth daily for 90 days. Max Daily Amount: 1 tablet    Patient feels this has decreased her appetite.  Is taking it on her days off.

## 2025-05-12 NOTE — PROGRESS NOTES
Liz De La Rosa (:  1975) is a 49 y.o. female,Established patient, here for evaluation of the following chief complaint(s):  Annual Exam and Medication Refill    Results for orders placed or performed in visit on 25   Lipid Panel   Result Value Ref Range    Cholesterol, Total 163 0 - 200 MG/DL    Triglycerides 46 0 - 150 MG/DL    HDL 69 (H) 40 - 60 MG/DL    LDL Cholesterol 85 0 - 100 MG/DL    VLDL Cholesterol Calculated 9 6 - 23 MG/DL    Chol/HDL Ratio 2.4 0.0 - 5.0     CBC with Auto Differential   Result Value Ref Range    WBC 6.7 4.3 - 11.1 K/uL    RBC 4.55 4.05 - 5.2 M/uL    Hemoglobin 12.5 11.7 - 15.4 g/dL    Hematocrit 40.3 35.8 - 46.3 %    MCV 88.6 82 - 102 FL    MCH 27.5 26.1 - 32.9 PG    MCHC 31.0 (L) 31.4 - 35.0 g/dL    RDW 14.4 11.9 - 14.6 %    Platelets 393 150 - 450 K/uL    MPV 9.4 9.4 - 12.3 FL    nRBC 0.00 0.0 - 0.2 K/uL    Differential Type AUTOMATED      Neutrophils % 43.4 43.0 - 78.0 %    Lymphocytes % 38.3 13.0 - 44.0 %    Monocytes % 7.5 4.0 - 12.0 %    Eosinophils % 9.8 (H) 0.5 - 7.8 %    Basophils % 0.8 0.0 - 2.0 %    Immature Granulocytes % 0.2 0.0 - 5.0 %    Neutrophils Absolute 2.90 1.70 - 8.20 K/UL    Lymphocytes Absolute 2.55 0.50 - 4.60 K/UL    Monocytes Absolute 0.50 0.10 - 1.30 K/UL    Eosinophils Absolute 0.65 0.00 - 0.80 K/UL    Basophils Absolute 0.05 0.00 - 0.20 K/UL    Immature Granulocytes Absolute 0.01 0.0 - 0.5 K/UL   Comprehensive Metabolic Panel   Result Value Ref Range    Sodium 139 136 - 145 mmol/L    Potassium 4.2 3.5 - 5.1 mmol/L    Chloride 104 98 - 107 mmol/L    CO2 24 20 - 29 mmol/L    Anion Gap 11 7 - 16 mmol/L    Glucose 99 70 - 99 mg/dL    BUN 10 6 - 23 MG/DL    Creatinine 1.02 0.60 - 1.10 MG/DL    Est, Glom Filt Rate 67 >60 ml/min/1.73m2    Calcium 9.6 8.8 - 10.2 MG/DL    Total Bilirubin 0.6 0.0 - 1.2 MG/DL    ALT 25 8 - 45 U/L    AST 31 15 - 37 U/L    Alk Phosphatase 63 35 - 104 U/L    Total Protein 7.3 6.3 - 8.2 g/dL    Albumin 3.9

## 2025-05-13 ENCOUNTER — RESULTS FOLLOW-UP (OUTPATIENT)
Dept: FAMILY MEDICINE CLINIC | Facility: CLINIC | Age: 50
End: 2025-05-13

## 2025-05-13 DIAGNOSIS — R79.89 LOW TSH LEVEL: Primary | ICD-10-CM

## 2025-05-15 ENCOUNTER — PATIENT MESSAGE (OUTPATIENT)
Dept: FAMILY MEDICINE CLINIC | Facility: CLINIC | Age: 50
End: 2025-05-15

## 2025-05-30 DIAGNOSIS — R10.2 PELVIC AND PERINEAL PAIN: ICD-10-CM

## 2025-05-30 RX ORDER — KETOROLAC TROMETHAMINE 10 MG/1
10 TABLET, FILM COATED ORAL EVERY 6 HOURS PRN
Qty: 20 TABLET | Refills: 2 | OUTPATIENT
Start: 2025-05-30 | End: 2026-05-30

## 2025-06-30 ENCOUNTER — TELEPHONE (OUTPATIENT)
Dept: OBGYN CLINIC | Age: 50
End: 2025-06-30

## 2025-06-30 DIAGNOSIS — N92.6 MISSED PERIOD: Primary | ICD-10-CM

## 2025-06-30 LAB — HCG SERPL QL: NEGATIVE

## 2025-06-30 NOTE — TELEPHONE ENCOUNTER
Pt called is concerned thinking that she may be pregnant. She has had negative home pregnancy test, but she feels movement. Order for blood pregnancy test sent to the lab. Once we have the results may need ultrasound.

## 2025-07-01 ENCOUNTER — RESULTS FOLLOW-UP (OUTPATIENT)
Dept: OBGYN CLINIC | Age: 50
End: 2025-07-01

## 2025-07-02 ENCOUNTER — PROCEDURE VISIT (OUTPATIENT)
Dept: OBGYN CLINIC | Age: 50
End: 2025-07-02
Payer: COMMERCIAL

## 2025-07-02 VITALS — BODY MASS INDEX: 32.45 KG/M2 | WEIGHT: 195 LBS | SYSTOLIC BLOOD PRESSURE: 110 MMHG | DIASTOLIC BLOOD PRESSURE: 80 MMHG

## 2025-07-02 DIAGNOSIS — Z86.19 HISTORY OF CHLAMYDIA: ICD-10-CM

## 2025-07-02 DIAGNOSIS — N89.8 VAGINAL ODOR: ICD-10-CM

## 2025-07-02 DIAGNOSIS — N92.6 IRREGULAR PERIODS: Primary | ICD-10-CM

## 2025-07-02 PROCEDURE — 76830 TRANSVAGINAL US NON-OB: CPT | Performed by: OBSTETRICS & GYNECOLOGY

## 2025-07-02 PROCEDURE — 99214 OFFICE O/P EST MOD 30 MIN: CPT | Performed by: OBSTETRICS & GYNECOLOGY

## 2025-07-02 PROCEDURE — 99459 PELVIC EXAMINATION: CPT | Performed by: OBSTETRICS & GYNECOLOGY

## 2025-07-02 NOTE — PROGRESS NOTES
MERVIN Borges Mckenna De La Rosa is a 49 y.o. female seen for c/o feeling like a baby is moving in her abdomen.  She also wants to be checked for BV and chlamydia    Past Medical History, Past Surgical History, Family history, Social History, and Medications were all reviewed with the patient today and updated as necessary.     Current Outpatient Medications   Medication Sig    budesonide-formoterol (SYMBICORT) 160-4.5 MCG/ACT AERO Inhale 2 puffs into the lungs 2 times daily    zolpidem (AMBIEN) 5 MG tablet Take 1 tablet by mouth nightly as needed for Sleep for up to 90 days. Max Daily Amount: 5 mg    Diethylpropion HCl CR 75 MG TB24 Take 1 tablet by mouth daily for 90 days. Max Daily Amount: 1 tablet    amphetamine-dextroamphetamine (ADDERALL XR) 5 MG extended release capsule Take 1 capsule by mouth 2 times daily for 30 days. Max Daily Amount: 10 mg    [START ON 7/11/2025] amphetamine-dextroamphetamine (ADDERALL XR) 5 MG extended release capsule Take 1 capsule by mouth in the morning and at bedtime for 30 days. Max Daily Amount: 10 mg    fluticasone (FLONASE) 50 MCG/ACT nasal spray 1 SPRAY INTO INTO EACH NOSTRIL TWICE A DAY    ibuprofen (ADVIL;MOTRIN) 800 MG tablet PLEASE SEE ATTACHED FOR DETAILED DIRECTIONS    ketorolac (TORADOL) 10 MG tablet Take 1 tablet by mouth every 6 hours as needed for Pain    clotrimazole-betamethasone (LOTRISONE) 1-0.05 % cream Apply topically 2 times daily.to foot and then moisturize with Cetaphil Cream.    cetirizine (ZYRTEC) 10 MG chewable tablet Zyrtec    amphetamine-dextroamphetamine (ADDERALL XR) 5 MG extended release capsule Take 1 capsule by mouth every morning.    amphetamine-dextroamphetamine (ADDERALL XR) 5 MG extended release capsule Take 1 capsule by mouth 2 times daily for 30 days. Max Daily Amount: 10 mg    amphetamine-dextroamphetamine (ADDERALL XR) 5 MG extended release capsule Take 1 capsule by mouth daily for 30 days. Max Daily Amount: 5 mg     No current

## 2025-07-07 ENCOUNTER — RESULTS FOLLOW-UP (OUTPATIENT)
Dept: OBGYN CLINIC | Age: 50
End: 2025-07-07

## 2025-07-08 DIAGNOSIS — N76.0 BV (BACTERIAL VAGINOSIS): Primary | ICD-10-CM

## 2025-07-08 DIAGNOSIS — B96.89 BV (BACTERIAL VAGINOSIS): Primary | ICD-10-CM

## 2025-07-08 RX ORDER — CLINDAMYCIN HYDROCHLORIDE 150 MG/1
150 CAPSULE ORAL 3 TIMES DAILY
Qty: 30 CAPSULE | Refills: 0 | Status: SHIPPED | OUTPATIENT
Start: 2025-07-08 | End: 2025-07-18

## 2025-07-08 NOTE — TELEPHONE ENCOUNTER
Pt notified of culture results. Prescription for clindamycin to pharmacy. She will call with problems.

## 2025-08-29 ENCOUNTER — COMMUNITY OUTREACH (OUTPATIENT)
Dept: FAMILY MEDICINE CLINIC | Facility: CLINIC | Age: 50
End: 2025-08-29

## (undated) DEVICE — SUTURE PLN GUT SZ 2-0 L27IN ABSRB YELLOWISH TAN L40MM CT 853H

## (undated) DEVICE — PENCIL ES L3M BTTN SWCH S STL HEX LOK BLDE ELECTRD HOLSTER

## (undated) DEVICE — SUTURE VCRL SZ 4-0 L27IN ABSRB UD L60MM KS STR REV CUT NDL J662H

## (undated) DEVICE — (D)PREP SKN CHLRAPRP APPL 26ML -- CONVERT TO ITEM 371833

## (undated) DEVICE — SUT CHRMC 1 36IN CTX BRN --

## (undated) DEVICE — SOLUTION IV 1000ML 0.9% SOD CHL

## (undated) DEVICE — AMD ANTIMICROBIAL GAUZE SPONGES,12 PLY USP TYPE VII, 0.2% POLYHEXAMETHYLENE BIGUANIDE HCI (PHMB): Brand: CURITY

## (undated) DEVICE — MEDI-VAC NON-CONDUCTIVE SUCTION TUBING: Brand: CARDINAL HEALTH

## (undated) DEVICE — KENDALL SCD EXPRESS SLEEVES, KNEE LENGTH, MEDIUM: Brand: KENDALL SCD

## (undated) DEVICE — Device: Brand: PORTEX

## (undated) DEVICE — SOLUTION IRRIG 1000ML H2O STRL BLT

## (undated) DEVICE — STAPLER SKIN SQ 30 ABSRB STPL DISP INSORB

## (undated) DEVICE — AMD ANTIMICROBIAL NON-ADHERENT PAD,0.2% POLYHEXAMETHYLENE BIGUANIDE HCI (PHMB): Brand: TELFA

## (undated) DEVICE — STERILE POLYISOPRENE POWDER-FREE SURGICAL GLOVES: Brand: PROTEXIS

## (undated) DEVICE — SURGICAL PROCEDURE PACK C SECT CDS

## (undated) DEVICE — REM POLYHESIVE ADULT PATIENT RETURN ELECTRODE: Brand: VALLEYLAB

## (undated) DEVICE — CATH FOL TY IC BAG 16FR 2000ML -- CONVERT TO ITEM 363158

## (undated) DEVICE — SUTURE VCRL SZ 1 L27IN ABSRB UD CT-1 L36MM 1/2 CIR J261H